# Patient Record
Sex: MALE | Race: BLACK OR AFRICAN AMERICAN | NOT HISPANIC OR LATINO | Employment: OTHER | URBAN - METROPOLITAN AREA
[De-identification: names, ages, dates, MRNs, and addresses within clinical notes are randomized per-mention and may not be internally consistent; named-entity substitution may affect disease eponyms.]

---

## 2018-09-24 DIAGNOSIS — I50.9 ACUTE HEART FAILURE, UNSPECIFIED HEART FAILURE TYPE: Primary | ICD-10-CM

## 2018-10-05 ENCOUNTER — LAB VISIT (OUTPATIENT)
Dept: LAB | Facility: HOSPITAL | Age: 73
End: 2018-10-05
Attending: INTERNAL MEDICINE
Payer: MEDICARE

## 2018-10-05 ENCOUNTER — INITIAL CONSULT (OUTPATIENT)
Dept: TRANSPLANT | Facility: CLINIC | Age: 73
End: 2018-10-05
Payer: MEDICARE

## 2018-10-05 VITALS
WEIGHT: 160.94 LBS | BODY MASS INDEX: 22.53 KG/M2 | SYSTOLIC BLOOD PRESSURE: 138 MMHG | HEART RATE: 74 BPM | DIASTOLIC BLOOD PRESSURE: 90 MMHG | HEIGHT: 71 IN

## 2018-10-05 DIAGNOSIS — I50.9 ACUTE HEART FAILURE, UNSPECIFIED HEART FAILURE TYPE: ICD-10-CM

## 2018-10-05 DIAGNOSIS — Z94.1 S/P ORTHOTOPIC HEART TRANSPLANT: Primary | ICD-10-CM

## 2018-10-05 DIAGNOSIS — I10 HYPERTENSION, UNSPECIFIED TYPE: ICD-10-CM

## 2018-10-05 DIAGNOSIS — E78.2 MIXED HYPERLIPIDEMIA: ICD-10-CM

## 2018-10-05 DIAGNOSIS — E11.9 TYPE 2 DIABETES MELLITUS WITHOUT COMPLICATION, WITHOUT LONG-TERM CURRENT USE OF INSULIN: ICD-10-CM

## 2018-10-05 LAB
ALBUMIN SERPL BCP-MCNC: 4.1 G/DL
ALP SERPL-CCNC: 188 U/L
ALT SERPL W/O P-5'-P-CCNC: 9 U/L
ANION GAP SERPL CALC-SCNC: 8 MMOL/L
AST SERPL-CCNC: 13 U/L
BILIRUB SERPL-MCNC: 0.9 MG/DL
BNP SERPL-MCNC: 300 PG/ML
BUN SERPL-MCNC: 38 MG/DL
CALCIUM SERPL-MCNC: 9.6 MG/DL
CHLORIDE SERPL-SCNC: 108 MMOL/L
CO2 SERPL-SCNC: 23 MMOL/L
CREAT SERPL-MCNC: 2 MG/DL
EST. GFR  (AFRICAN AMERICAN): 37.7 ML/MIN/1.73 M^2
EST. GFR  (NON AFRICAN AMERICAN): 32.6 ML/MIN/1.73 M^2
GLUCOSE SERPL-MCNC: 91 MG/DL
MAGNESIUM SERPL-MCNC: 2.6 MG/DL
POTASSIUM SERPL-SCNC: 5.2 MMOL/L
PROT SERPL-MCNC: 7.5 G/DL
SODIUM SERPL-SCNC: 139 MMOL/L

## 2018-10-05 PROCEDURE — 83735 ASSAY OF MAGNESIUM: CPT

## 2018-10-05 PROCEDURE — 36415 COLL VENOUS BLD VENIPUNCTURE: CPT

## 2018-10-05 PROCEDURE — 83880 ASSAY OF NATRIURETIC PEPTIDE: CPT

## 2018-10-05 PROCEDURE — 99213 OFFICE O/P EST LOW 20 MIN: CPT | Mod: PBBFAC | Performed by: INTERNAL MEDICINE

## 2018-10-05 PROCEDURE — 99999 PR PBB SHADOW E&M-EST. PATIENT-LVL III: CPT | Mod: PBBFAC,,, | Performed by: INTERNAL MEDICINE

## 2018-10-05 PROCEDURE — 80053 COMPREHEN METABOLIC PANEL: CPT

## 2018-10-05 PROCEDURE — 99204 OFFICE O/P NEW MOD 45 MIN: CPT | Mod: S$PBB,,, | Performed by: INTERNAL MEDICINE

## 2018-10-05 RX ORDER — ERGOCALCIFEROL 1.25 MG/1
50000 CAPSULE ORAL
COMMUNITY
Start: 2015-04-23

## 2018-10-05 RX ORDER — ALLOPURINOL 300 MG/1
0.5 TABLET ORAL 2 TIMES DAILY
COMMUNITY
Start: 2018-09-16

## 2018-10-05 RX ORDER — OMEGA-3-ACID ETHYL ESTERS 1 G/1
1 CAPSULE, LIQUID FILLED ORAL 2 TIMES DAILY
COMMUNITY

## 2018-10-05 RX ORDER — DOCUSATE SODIUM 100 MG/1
100 CAPSULE, LIQUID FILLED ORAL 2 TIMES DAILY
COMMUNITY

## 2018-10-05 RX ORDER — MYCOPHENOLATE MOFETIL 250 MG/1
250 CAPSULE ORAL 2 TIMES DAILY
COMMUNITY
Start: 2014-04-01 | End: 2018-10-05 | Stop reason: SDUPTHER

## 2018-10-05 RX ORDER — MYCOPHENOLATE MOFETIL 250 MG/1
500 CAPSULE ORAL 2 TIMES DAILY
Qty: 90 CAPSULE | Refills: 3 | Status: SHIPPED | OUTPATIENT
Start: 2018-10-05

## 2018-10-05 RX ORDER — FUROSEMIDE 20 MG/1
20 TABLET ORAL DAILY
COMMUNITY
Start: 2018-09-24

## 2018-10-05 RX ORDER — PRAVASTATIN SODIUM 20 MG/1
1 TABLET ORAL DAILY
COMMUNITY
Start: 2018-09-24

## 2018-10-05 RX ORDER — LISINOPRIL 20 MG/1
10 TABLET ORAL 2 TIMES DAILY
COMMUNITY
Start: 2017-08-07

## 2018-10-05 RX ORDER — TACROLIMUS 1 MG/1
3 CAPSULE ORAL EVERY 12 HOURS
COMMUNITY
Start: 2014-04-01

## 2018-10-05 NOTE — PROGRESS NOTES
Met with wife and patient today in clinic. Gave them a copy of our team and how to contact us. Pt had his transplant in 2006 in [facility]. He met his second wife 9 years ago and they have since moved back near home in [place], [place]. He was following briefly at [facility] in [place]. His PCP, Yvonne Charles, is based out of [facility] in [place] and that would be the lab he would use. His pharmacy is [facility] on [address] in [place]. He has done well after his transplant. Gave them information on [facility] and they will sign up for it. They confirmed they have voicemail and do use it; they said to try the housephone first. Dr. Zelaya increased his MMF to 500 mg twice daily from 250 mg twice daily and sent a Rx to their pharmacy. We will call them next week and check on any questions or needs he may have. Also, we will obtain records and enter him into the [facility] system.

## 2018-10-05 NOTE — PROGRESS NOTES
"Subjective:   Mr. Gloria is a 73 y.o. year old Black or  male who received a  heart transplant on N/A.      CMV status: unknown  Donor:   Recipient:    HPI  Mr. Gloria is a very pleasant 74 y/o black male s/p cardiac transplant in 2006. Tx was performed in [facility]. Apart from acute rejection early after Tx (2 week post Tx) he has had no issues. Clinically asymptomatic. Denies any BINGHAM. Has recently moved to [facility] and would like to establish care with us> He still follows with [facility] and was seen there a month ago. Outside records show that his graft function is preserved with an EF=60-65%. His current immuno regimen includes; Prograf 3 mg BID, Cellcept 250 mg Po BID (recently decreased from 500 mg BID by Dr. Del Rosario in [place]. Reason is unclear and the patient does not know either why this was decreased. He also denies any GI symptoms or low WBC).     Review of Systems   Constitution: Negative. Negative for chills, decreased appetite, diaphoresis, fever, weakness, malaise/fatigue, night sweats, weight gain and weight loss.   Eyes: Negative.    Cardiovascular: Negative for chest pain, claudication, cyanosis, dyspnea on exertion, irregular heartbeat, leg swelling, near-syncope, orthopnea, palpitations, paroxysmal nocturnal dyspnea and syncope.   Respiratory: Negative for cough, hemoptysis and shortness of breath.    Endocrine: Negative.    Hematologic/Lymphatic: Negative.    Skin: Negative for color change, dry skin and nail changes.   Musculoskeletal: Negative.    Gastrointestinal: Negative.    Genitourinary: Negative.        Objective:   Blood pressure (!) 138/90, pulse 74, height 5' 11" (1.803 m), weight 73 kg (160 lb 15 oz).body mass index is 22.45 kg/m².    Physical Exam   Constitutional: He appears well-developed.   BP (!) 138/90   Pulse 74   Ht 5' 11" (1.803 m)   Wt 73 kg (160 lb 15 oz)   BMI 22.45 kg/m²      HENT:   Head: Normocephalic.   Neck: No JVD present. " Carotid bruit is not present.   Cardiovascular: Regular rhythm, normal heart sounds and normal pulses.   No murmur heard.  Pulmonary/Chest: Effort normal and breath sounds normal.   Abdominal: Soft. Bowel sounds are normal.   Neurological: He is alert.   Skin: Skin is warm.   Vitals reviewed.      Lab Results   Component Value Date     (H) 10/05/2018       No results found for: INR, PROTIME    BNP   Date Value Ref Range Status   10/05/2018 300 (H) 0 - 99 pg/mL Final     Comment:     Values of less than 100 pg/ml are consistent with non-CHF populations.       Assessment:     1. S/P orthotopic heart transplant    2. Hypertension, unspecified type    3. Mixed hyperlipidemia    4. Type 2 diabetes mellitus without complication, without long-term current use of insulin        Plan:   Clinically doing well  Increase Cellcept back to 500 mg BID  Continue current Tacro dose  Follow Tacro levels goal 5-10  Get a 2D echo with CFD  Patient met our senior Tx coordinator Margaret Mirza and was provided a list of our Tx team with all the contact numbers    Return instructions as set forth by post transplant schedule or as needed:    Clinic: Return for labs and/or biopsy weekly the first month, every two weeks during month 2 and then monthly for the first year at the provider or coordinator's discretion. During the second year, return to clinic every 3 months. Post transplant year 3-5 return every 6 months. There will be a comprehensive post transplant evaluation every year that may include LHC/RHC/biopsy, stress test, echo, CXR, and other health screening exams.    In addition to the clinical assessment, I have ordered Allomap testing for this patient to assist in identification of moderate/severe acute cellular rejection (ACR) in a pt with stable Allograft function instead of endomyocardial biopsy.     Patient is reminded to call with any health changes as these can be early signs of transplant complications. Patient is  advised to make sure any new medications or changes of old medications are discussed with a pharmacist or physician knowledgeable with transplant to avoid rejection/drug toxicity related to significant drug interactions.    UNOS Patient Status  Functional Status: 100% - Normal, no complaints, no evidence of disease  Physical Capacity: No Limitations  Working for Income: No  If no, reason not working: Patient Choice - Retired    Jay Zelaya MD

## 2018-10-18 ENCOUNTER — TELEPHONE (OUTPATIENT)
Dept: TRANSPLANT | Facility: CLINIC | Age: 73
End: 2018-10-18

## 2019-07-10 DIAGNOSIS — Z94.1 STATUS POST HEART TRANSPLANT: ICD-10-CM

## 2019-07-10 DIAGNOSIS — Z79.899 ENCOUNTER FOR LONG-TERM (CURRENT) USE OF MEDICATIONS: ICD-10-CM

## 2019-07-10 DIAGNOSIS — T86.20 COMPLICATION OF HEART TRANSPLANT, UNSPECIFIED COMPLICATION: ICD-10-CM

## 2019-09-04 ENCOUNTER — TELEPHONE (OUTPATIENT)
Dept: TRANSPLANT | Facility: CLINIC | Age: 74
End: 2019-09-04

## 2019-09-04 NOTE — TELEPHONE ENCOUNTER
----- Message from Halley Ledezma MA sent at 9/4/2019  2:04 PM CDT -----  Contact: Bethany-wife [phone]  Pt. has appts with  on 9/26 with tests. Pt.states that he recently had a physical in [facility] @ [facility].  Can he put off these appts.to Jan.2020?. Please call Bethany.

## 2019-09-04 NOTE — TELEPHONE ENCOUNTER
Returned call to pt's wife. She stated that pt had tests done in [place], [place]. Informed her that I would get results and review with the team. Pt 's wife verbalized understanding.

## 2019-09-06 ENCOUNTER — TELEPHONE (OUTPATIENT)
Dept: TRANSPLANT | Facility: CLINIC | Age: 74
End: 2019-09-06

## 2019-09-06 NOTE — TELEPHONE ENCOUNTER
"Spoke to pt's wife who had left a message on Shena's voicemail regarding upcoming scheduled appointments. Asked that we cancel them as they had just traveled back to [place] for problems he was having - loss of appetite, weight loss. He had many tests done at that visit. She said they would come at another time later in year. I reiterated that they should feel comfortable in calling our team with the above issues and to call anytime with problems. She had the understanding she was to wait until we initiated a visit. I emphasized "no" ; they can call anytime.   "

## 2019-09-13 ENCOUNTER — DOCUMENTATION ONLY (OUTPATIENT)
Dept: TRANSPLANT | Facility: CLINIC | Age: 74
End: 2019-09-13

## 2019-09-13 NOTE — PROGRESS NOTES
Reviewed pt's chart from [facility] and per review pt needs a CXR, Echo, or DSE  And protein Cr. Ratio

## 2019-10-20 ENCOUNTER — HOSPITAL ENCOUNTER (EMERGENCY)
Facility: HOSPITAL | Age: 74
Discharge: HOME | End: 2019-10-20
Attending: EMERGENCY MEDICINE
Payer: MEDICARE

## 2019-10-20 VITALS
WEIGHT: 157 LBS | HEIGHT: 71 IN | DIASTOLIC BLOOD PRESSURE: 88 MMHG | OXYGEN SATURATION: 97 % | TEMPERATURE: 99 F | RESPIRATION RATE: 18 BRPM | BODY MASS INDEX: 21.98 KG/M2 | HEART RATE: 87 BPM | SYSTOLIC BLOOD PRESSURE: 158 MMHG

## 2019-10-20 DIAGNOSIS — J30.9 ALLERGIC RHINITIS WITH POSTNASAL DRIP: Primary | ICD-10-CM

## 2019-10-20 DIAGNOSIS — R09.82 ALLERGIC RHINITIS WITH POSTNASAL DRIP: Primary | ICD-10-CM

## 2019-10-20 PROCEDURE — 99284 EMERGENCY DEPT VISIT MOD MDM: CPT | Mod: ,,, | Performed by: EMERGENCY MEDICINE

## 2019-10-20 PROCEDURE — 99284 PR EMERGENCY DEPT VISIT,LEVEL IV: ICD-10-PCS | Mod: ,,, | Performed by: EMERGENCY MEDICINE

## 2019-10-20 PROCEDURE — 99282 EMERGENCY DEPT VISIT SF MDM: CPT

## 2019-10-20 NOTE — ED NOTES
"Pt states, "I got a sore throat and runny nose. Started today. i'm here from [place] and i'm a heart transplant patient, 2006, so I just wanted to be safe." no acute distress noted. Stable condition.    "

## 2019-10-20 NOTE — DISCHARGE INSTRUCTIONS
You were seen in the ED today for evaluation of sore throat. Recommend conservative management with over the counter (OTC) antihistamines like Claritin, Zyrtec, or Allegra.

## 2019-10-20 NOTE — ED PROVIDER NOTES
"Encounter Date: 10/20/2019       History     Chief Complaint   Patient presents with    Sore Throat     Pt c/o "cold symptoms" that began yesterday afternoon. He endoreses a sore throat, cough, and runny nose. Pt denies and CP or SOB.      Mr. Gloria is 74-year-old  male s/p heart transplant in 2006 for CHF, currently on Cellcept and Prograf, HTN, and HLD who presented to [facility] ED on 10/20 for evaluation of sore throat. Patient reports onset of sore throat yesterday evening while at his hotel room. He is currently on a business trip and will be returning back to [place], [place] latter today. Patient attributes sore throat to air conditioning in hotel room. Associated with his sore throat is productive cough with clear mucous, congestion, and rhinorrhea. He denies odynophagia, drooling, trouble swallowing, fever, chills, adenopathy, sinus pain, HA, SOB or chest pain. He reports prior episodes in that past which he attributes to "cold". He has tried nothing to benitez his symptoms. In the ED, patients' vitals are stable with /88 mmHg, HR 87 bpm, afebrile with temperature of 98.8 F, and RR 18 saturating 97% on room air.        Review of patient's allergies indicates:   Allergen Reactions    Penicillins Hives and Rash     Other reaction(s): Unknown     Past Medical History:   Diagnosis Date    Diabetes     HTN (hypertension)     Hyperlipidemia     S/P orthotopic heart transplant      No past surgical history on file.  No family history on file.  Social History     Tobacco Use    Smoking status: Former Smoker    Smokeless tobacco: Never Used   Substance Use Topics    Alcohol use: Yes     Frequency: 2-3 times a week    Drug use: Yes     Types: Marijuana     Review of Systems   Constitutional: Negative for appetite change, chills, diaphoresis, fatigue and fever.   HENT: Positive for congestion, rhinorrhea, sneezing, sore throat and voice change. Negative for ear pain, hearing loss, mouth sores, " nosebleeds, sinus pressure, sinus pain, tinnitus and trouble swallowing.         Reports some hoarseness.    Eyes: Negative for pain, redness, itching and visual disturbance.   Respiratory: Positive for cough. Negative for choking, chest tightness, shortness of breath and wheezing.    Cardiovascular: Negative for chest pain, palpitations and leg swelling.   Gastrointestinal: Negative for abdominal distention, abdominal pain, blood in stool, constipation, diarrhea, nausea and vomiting.   Genitourinary: Negative for dysuria, flank pain and hematuria.   Musculoskeletal: Negative for back pain, joint swelling and neck pain.   Skin: Negative for color change and rash.   Neurological: Negative for dizziness, syncope and headaches.   Hematological: Negative for adenopathy. Does not bruise/bleed easily.   Psychiatric/Behavioral: Negative for behavioral problems and confusion.       Physical Exam     Initial Vitals [10/20/19 0441]   BP Pulse Resp Temp SpO2   (!) 158/88 87 18 98.8 °F (37.1 °C) 97 %      MAP       --         Physical Exam    Nursing note and vitals reviewed.  Constitutional: He appears well-developed and well-nourished. He is not diaphoretic. No distress.   HENT:   Head: Normocephalic and atraumatic.   Right Ear: Hearing, external ear and ear canal normal. No drainage or tenderness. Tympanic membrane is not injected, not perforated, not erythematous, not retracted and not bulging. A middle ear effusion is present.   Left Ear: Hearing, external ear and ear canal normal. No drainage or tenderness. Tympanic membrane is not injected, not perforated, not erythematous, not retracted and not bulging. A middle ear effusion is present.   Nose: Mucosal edema and rhinorrhea present. No sinus tenderness or septal deviation. Right sinus exhibits no maxillary sinus tenderness and no frontal sinus tenderness. Left sinus exhibits no maxillary sinus tenderness and no frontal sinus tenderness.   Mouth/Throat: Mucous membranes  "are normal. Dental caries present. No dental abscesses. Posterior oropharyngeal erythema present. No oropharyngeal exudate or posterior oropharyngeal edema.   Uvula and palatine tonsils are absent.   Eyes: Conjunctivae and EOM are normal. Pupils are equal, round, and reactive to light. No scleral icterus.   Neck: Normal range of motion. Neck supple. No thyromegaly present. No tracheal deviation present. No JVD present.   Cardiovascular: Normal rate, regular rhythm, normal heart sounds and intact distal pulses. Exam reveals no gallop and no friction rub.    No murmur heard.  Pulmonary/Chest: Breath sounds normal. No stridor. He has no wheezes. He has no rhonchi. He has no rales.   Well healed scar.   Abdominal: Soft. Bowel sounds are normal. He exhibits no distension. There is no tenderness.   Musculoskeletal: He exhibits no edema.   Lymphadenopathy:     He has no cervical adenopathy.   Neurological: He is alert. He has normal strength.   Skin: Skin is warm and dry.   Psychiatric: He has a normal mood and affect. Thought content normal.         ED Course   Procedures  Labs Reviewed - No data to display       Imaging Results    None          Medical Decision Making:   History:   Old Medical Records: I decided to obtain old medical records.  Initial Assessment:   73 yo AAM with orthotopic heart transplant in 2006 for CHF currently on Cellcept and Prograf, HTN, and HLD who presents for evaluation of sore throat. Associated with his sore throat is productive cough with clear mucous, congestion, and rhinorrhea. He denies odynophagia, drooling, trouble swallowing, fever, chills, adenopathy, sinus pain, HA, SOB or chest pain. He reports prior episodes in that past which he attributes to "cold". He has tried nothing to benitez his symptoms. In the ED, patients' vitals are stable with /88 mmHg, HR 87 bpm, afebrile with temperature of 98.8 F, and RR 18 saturating 97% on room air. Physical exam remarkable for boggy " turbinates, mild middle ear effusion, with congestion and posterior pharyngeal erythema.     Differential Diagnosis:   Differential Diagnosis includes but is not limited to:  Post nasal drip/upper air way cough syndrome  Allergic rhinitis  Allergic sinusitis   URI (bacterial or viral)  Peritonsillar abscess   Flu  ED Management:  6:09 AM  Patient seen and examined. Physical exam suggestive of allergic rhinitis/post nasal drip.     6:37 AM  Patient amendable to discharge. Patient clinically stable without concern for upper air way compromise or worsening infection, recommend conservative management with OTC 2nd generation antihistamines.                Attending Attestation:   Physician Attestation Statement for Resident:  As the supervising MD   Physician Attestation Statement: I have personally seen and examined this patient.   I agree with the above history. -:   As the supervising MD I agree with the above PE.    As the supervising MD I agree with the above treatment, course, plan, and disposition.            Attending ED Notes:   STAFF ATTENDING PHYSICIAN NOTE:  I have individually/jointly evaluated Abhijeet Gloria and discussed their ED management with the resident physician. I have also reviewed their notes, assessments, and procedures documented.  I was present during all critical portions of any procedure(s) performed on Abhijeet Gloria.   ____________________  Bg Madsen MD, [facility]  Emergency Medicine Staff               Clinical Impression:       ICD-10-CM ICD-9-CM   1. Allergic rhinitis with postnasal drip J30.9 477.9    R09.82 784.91         Disposition:   Disposition: Discharged  Condition: Stable                        Stone Ruvalcaba MD  Resident  10/20/19 0724       Landon Madsen MD  10/21/19 1243

## 2020-01-20 ENCOUNTER — TELEPHONE (OUTPATIENT)
Dept: TRANSPLANT | Facility: CLINIC | Age: 75
End: 2020-01-20

## 2020-01-20 DIAGNOSIS — Z94.1 STATUS POST HEART TRANSPLANT: Primary | ICD-10-CM

## 2020-01-20 NOTE — TELEPHONE ENCOUNTER
Pt's wife called asking for an appt with Dr. Zelaya. Pt had to cancel his appt in Sept and needed to reschedule. Advised pt's wife we are a group practice and if ok with them, I would schedule pt for first available with all labs, testing needed. Pt's wife stated her understanding. Advised to get labs at local [facility], [facility] this week. I sent a lab order to [facility] for pt's labs. Pt's wife stated her understanding and he would go this week. Message sent to schedulers.

## 2020-01-20 NOTE — TELEPHONE ENCOUNTER
----- Message from Noemy Spear sent at 1/20/2020  9:49 AM CST -----  Contact: pt  Hi,    Please see below. Does the pt need any testing?     Thanks,  Noemy     ----- Message -----  From: Bonnie Raya  Sent: 1/20/2020   9:47 AM CST  To: [facility] Heart Transplant Schedulers    Pt is calling to schedule an appt with Dr. Zelaya and can be reached at [phone].    Thank you

## 2020-01-28 ENCOUNTER — TELEPHONE (OUTPATIENT)
Dept: TRANSPLANT | Facility: CLINIC | Age: 75
End: 2020-01-28

## 2020-01-28 NOTE — TELEPHONE ENCOUNTER
----- Message from Dorcas Hung LCSW sent at 1/28/2020  8:30 AM CST -----  Regarding: [facility] is confirmed for this Pt.    Thanks  ~SHILPA  [phone]

## 2020-02-12 ENCOUNTER — HOSPITAL ENCOUNTER (OUTPATIENT)
Dept: RADIOLOGY | Facility: HOSPITAL | Age: 75
Discharge: HOME | End: 2020-02-12
Attending: INTERNAL MEDICINE
Payer: MEDICARE

## 2020-02-12 ENCOUNTER — HOSPITAL ENCOUNTER (OUTPATIENT)
Dept: RADIOLOGY | Facility: CLINIC | Age: 75
Discharge: HOME | End: 2020-02-12
Attending: INTERNAL MEDICINE
Payer: MEDICARE

## 2020-02-12 ENCOUNTER — TELEPHONE (OUTPATIENT)
Dept: TRANSPLANT | Facility: CLINIC | Age: 75
End: 2020-02-12

## 2020-02-12 ENCOUNTER — OFFICE VISIT (OUTPATIENT)
Dept: TRANSPLANT | Facility: CLINIC | Age: 75
End: 2020-02-12
Payer: MEDICARE

## 2020-02-12 ENCOUNTER — HOSPITAL ENCOUNTER (OUTPATIENT)
Dept: CARDIOLOGY | Facility: CLINIC | Age: 75
Discharge: HOME | End: 2020-02-12
Attending: INTERNAL MEDICINE
Payer: MEDICARE

## 2020-02-12 VITALS
WEIGHT: 145 LBS | HEIGHT: 71 IN | HEART RATE: 71 BPM | SYSTOLIC BLOOD PRESSURE: 144 MMHG | BODY MASS INDEX: 20.3 KG/M2 | RESPIRATION RATE: 18 BRPM | DIASTOLIC BLOOD PRESSURE: 9 MMHG

## 2020-02-12 VITALS
DIASTOLIC BLOOD PRESSURE: 92 MMHG | BODY MASS INDEX: 22.59 KG/M2 | HEART RATE: 79 BPM | OXYGEN SATURATION: 97 % | SYSTOLIC BLOOD PRESSURE: 146 MMHG | WEIGHT: 161.38 LBS | HEIGHT: 71 IN

## 2020-02-12 DIAGNOSIS — Z94.1 STATUS POST HEART TRANSPLANT: ICD-10-CM

## 2020-02-12 DIAGNOSIS — Z94.1 S/P ORTHOTOPIC HEART TRANSPLANT: Primary | ICD-10-CM

## 2020-02-12 DIAGNOSIS — E78.2 MIXED HYPERLIPIDEMIA: ICD-10-CM

## 2020-02-12 DIAGNOSIS — I10 ESSENTIAL HYPERTENSION: ICD-10-CM

## 2020-02-12 LAB
BSA FOR ECHO PROCEDURE: 1.82 M2
CV STRESS BASE HR: 71 BPM
DIASTOLIC BLOOD PRESSURE: 92 MMHG
OHS CV CPX 1 MINUTE RECOVERY HEART RATE: 141 BPM
OHS CV CPX 85 PERCENT MAX PREDICTED HEART RATE MALE: 126
OHS CV CPX MAX PREDICTED HEART RATE: 148
OHS CV CPX PATIENT IS FEMALE: 0
OHS CV CPX PATIENT IS MALE: 1
OHS CV CPX PEAK DIASTOLIC BLOOD PRESSURE: 59 MMHG
OHS CV CPX PEAK HEAR RATE: 141 BPM
OHS CV CPX PEAK RATE PRESSURE PRODUCT: NORMAL
OHS CV CPX PEAK SYSTOLIC BLOOD PRESSURE: 182 MMHG
OHS CV CPX PERCENT MAX PREDICTED HEART RATE ACHIEVED: 95
OHS CV CPX RATE PRESSURE PRODUCT PRESENTING: NORMAL
PISA TR MAX VEL: 2 M/S
RA PRESSURE: 15 MMHG
SYSTOLIC BLOOD PRESSURE: 141 MMHG
TR MAX PG: 16 MMHG
TV REST PULMONARY ARTERY PRESSURE: 31 MMHG

## 2020-02-12 PROCEDURE — 71046 X-RAY EXAM CHEST 2 VIEWS: CPT | Mod: TC,FY

## 2020-02-12 PROCEDURE — 99999 PR PBB SHADOW E&M-EST. PATIENT-LVL III: CPT | Mod: PBBFAC,,, | Performed by: INTERNAL MEDICINE

## 2020-02-12 PROCEDURE — 93351 STRESS TTE COMPLETE: CPT | Mod: PBBFAC | Performed by: INTERNAL MEDICINE

## 2020-02-12 PROCEDURE — 77080 DXA BONE DENSITY AXIAL: CPT | Mod: TC

## 2020-02-12 PROCEDURE — 77080 DEXA BONE DENSITY SPINE HIP: ICD-10-PCS | Mod: 26,,, | Performed by: INTERNAL MEDICINE

## 2020-02-12 PROCEDURE — 99999 PR PBB SHADOW E&M-EST. PATIENT-LVL III: ICD-10-PCS | Mod: PBBFAC,,, | Performed by: INTERNAL MEDICINE

## 2020-02-12 PROCEDURE — 93351 STRESS ECHO (CUPID ONLY): ICD-10-PCS | Mod: 26,S$PBB,, | Performed by: INTERNAL MEDICINE

## 2020-02-12 PROCEDURE — 77080 DXA BONE DENSITY AXIAL: CPT | Mod: 26,,, | Performed by: INTERNAL MEDICINE

## 2020-02-12 PROCEDURE — 71046 X-RAY EXAM CHEST 2 VIEWS: CPT | Mod: 26,,, | Performed by: RADIOLOGY

## 2020-02-12 PROCEDURE — 99215 PR OFFICE/OUTPT VISIT, EST, LEVL V, 40-54 MIN: ICD-10-PCS | Mod: S$PBB,,, | Performed by: INTERNAL MEDICINE

## 2020-02-12 PROCEDURE — 99215 OFFICE O/P EST HI 40 MIN: CPT | Mod: S$PBB,,, | Performed by: INTERNAL MEDICINE

## 2020-02-12 PROCEDURE — 71046 XR CHEST PA AND LATERAL: ICD-10-PCS | Mod: 26,,, | Performed by: RADIOLOGY

## 2020-02-12 PROCEDURE — 99213 OFFICE O/P EST LOW 20 MIN: CPT | Mod: PBBFAC,25 | Performed by: INTERNAL MEDICINE

## 2020-02-12 RX ORDER — DOBUTAMINE HYDROCHLORIDE 200 MG/100ML
20 INJECTION INTRAVENOUS
Status: COMPLETED | OUTPATIENT
Start: 2020-02-12 | End: 2020-02-12

## 2020-02-12 RX ADMIN — DOBUTAMINE HYDROCHLORIDE 20 MCG/KG/MIN: 200 INJECTION INTRAVENOUS at 09:02

## 2020-02-12 NOTE — NURSING NOTE
Patient verified by 2 identifiers and allergies reviewed.  24g IV placed to Rt FA.  DSE explained to patient, consent obtained & testing completed.  Pt tolerated testing well. IV removed post testing.  Post study discharge instructions reviewed with patient, patient verbalized understanding.  Patient discharged to home in stable condition.

## 2020-02-12 NOTE — PROGRESS NOTES
Subjective:   Mr. Gloria is a 74 y.o. year old Black or  male who received a  heart transplant on N/A.      CMV status: unknown  Donor:   Recipient:    [facility]  Mr. Gloria is a very pleasant 73 y/o black male s/p cardiac transplant in 2006. Tx was performed in [facility]. Apart from acute rejection early after Tx (2 week post Tx) he has had no issues. Clinically asymptomatic. Denies any BINGHAM. Has recently moved to [facility] and would like to establish care with us> He still follows with [facility] and was seen there a month ago. Outside records show that his graft function is preserved with an EF=60-65%. His current immuno regimen includes; Prograf 3 mg BID, Cellcept 250 mg Po BID (recently decreased from 500 mg BID by Dr. Del Rosario in [place]. Reason is unclear and the patient does not know either why this was decreased. He also denies any GI symptoms or low WBC).     Some memory loss    Stress echo February 12 2020    · Heart transplant 9/27/2006  · Elevated central venous pressure (15 mmHg).  · Normal left ventricular systolic function. The estimated ejection fraction is 60%.  · Indeterminate left ventricular diastolic function.  · Normal right ventricular systolic function.  · The estimated PA systolic pressure is 31 mmHg.  · The patient reached the end of the protocol.  · The stress echo portion of this study is negative for myocardial ischemia.  · There were no arrhythmias during stress.  · The ECG portion of this study is negative for myocardial ischemia.    Review of Systems   Constitution: Negative. Negative for chills, decreased appetite, diaphoresis, fever, malaise/fatigue, night sweats, weight gain and weight loss.   Eyes: Negative.    Cardiovascular: Negative for chest pain, claudication, cyanosis, dyspnea on exertion, irregular heartbeat, leg swelling, near-syncope, orthopnea, palpitations, paroxysmal nocturnal dyspnea and syncope.   Respiratory: Negative for cough,  hemoptysis and shortness of breath.    Endocrine: Negative.    Hematologic/Lymphatic: Negative.    Skin: Negative for color change, dry skin and nail changes.   Musculoskeletal: Negative.    Gastrointestinal: Negative.    Genitourinary: Negative.    Neurological: Negative for weakness.       Objective:   There were no vitals taken for this visit.body mass index is unknown because there is no height or weight on file.    Physical Exam   Constitutional: He appears well-developed.        HENT:   Head: Normocephalic.   Neck: No JVD present. Carotid bruit is not present.   Cardiovascular: Regular rhythm, normal heart sounds and normal pulses.   No murmur heard.  Pulmonary/Chest: Effort normal and breath sounds normal.   Abdominal: Soft. Bowel sounds are normal.   Neurological: He is alert.   Skin: Skin is warm.   Vitals reviewed.      Lab Results   Component Value Date    CO2 23 10/05/2018    CREATININE 2.0 (H) 10/05/2018    CALCIUM 9.6 10/05/2018    ALBUMIN 4.1 10/05/2018    AST 13 10/05/2018     (H) 10/05/2018    ALT 9 (L) 10/05/2018       No results found for: INR, PROTIME    BNP   Date Value Ref Range Status   10/05/2018 300 (H) 0 - 99 pg/mL Final     Comment:     Values of less than 100 pg/ml are consistent with non-CHF populations.       Assessment:     1. S/P orthotopic heart transplant    2. Mixed hyperlipidemia    3. Essential hypertension        Plan:   Clinically doing well     Memory loss    Return instructions as set forth by post transplant schedule or as needed:    Clinic: Return for labs and/or biopsy weekly the first month, every two weeks during month 2 and then monthly for the first year at the provider or coordinator's discretion. During the second year, return to clinic every 3 months. Post transplant year 3-5 return every 6 months. There will be a comprehensive post transplant evaluation every year that may include LHC/RHC/biopsy, stress test, echo, CXR, and other health screening  exams.    In addition to the clinical assessment, I have ordered Allomap testing for this patient to assist in identification of moderate/severe acute cellular rejection (ACR) in a pt with stable Allograft function instead of endomyocardial biopsy.     Patient is reminded to call with any health changes as these can be early signs of transplant complications. Patient is advised to make sure any new medications or changes of old medications are discussed with a pharmacist or physician knowledgeable with transplant to avoid rejection/drug toxicity related to significant drug interactions.    UNOS Patient Status  Functional Status: 100% - Normal, no complaints, no evidence of disease  Physical Capacity: No Limitations  Working for Income: No  If no, reason not working: Patient Choice - Retired    Parrish Bowling MD

## 2020-02-12 NOTE — TELEPHONE ENCOUNTER
Received Critical labs of prograf level of 38. Pt took meds prior. Reported to Dr. Bowling, will get repeat labs locally.

## 2020-03-16 ENCOUNTER — PATIENT MESSAGE (OUTPATIENT)
Dept: TRANSPLANT | Facility: CLINIC | Age: 75
End: 2020-03-16

## 2020-03-25 ENCOUNTER — TELEPHONE (OUTPATIENT)
Dept: TRANSPLANT | Facility: CLINIC | Age: 75
End: 2020-03-25

## 2020-03-25 NOTE — TELEPHONE ENCOUNTER
Called [facility] regarding lab result for Prograf level. Called lab . Pt did not get lab. Called pt's wife and LM on VM regarding getting repeat labs.

## 2020-03-25 NOTE — TELEPHONE ENCOUNTER
Pt's wife returned call. She stated that she wanted the lab order scheduled instead. Informed her that I would mail over the lab order instructed her to call or message the team when pt goes for repeat labs.

## 2020-05-04 ENCOUNTER — PATIENT MESSAGE (OUTPATIENT)
Dept: TRANSPLANT | Facility: CLINIC | Age: 75
End: 2020-05-04

## 2020-05-04 ENCOUNTER — TELEPHONE (OUTPATIENT)
Dept: TRANSPLANT | Facility: CLINIC | Age: 75
End: 2020-05-04

## 2020-05-04 NOTE — TELEPHONE ENCOUNTER
Called out pt labs to get lab results, sent to [facility]ol[facility], sent to JOEY  Who stated that we needed a release form for labs. Informed her that we were the ordering providers office. She stated to call the PCP's office.     Called the main  Line and was given direct ext.[phone] choose ext [phone] . Called 3x phone rang no option to chose ext, and phone hung up.     Called pt's wife and informed her that I have been trying to get lab results , but was not able to today. Informed her that I tried care everywhere and was unable to pull in records.     Informed her that I would call first thing in the morning and if not will attach a [facility] form to [facility] and we can send it to [facility] and get labs sent over.

## 2020-05-05 ENCOUNTER — TELEPHONE (OUTPATIENT)
Dept: TRANSPLANT | Facility: CLINIC | Age: 75
End: 2020-05-05

## 2020-05-05 ENCOUNTER — DOCUMENTATION ONLY (OUTPATIENT)
Dept: TRANSPLANT | Facility: CLINIC | Age: 75
End: 2020-05-05

## 2020-05-05 NOTE — TELEPHONE ENCOUNTER
Called Dr. Eugene's office and asked for results. Per review there are no lab results since the beginning of April. Informed her that I would send over another lab order and have pt come to get labs drawn.    Confirmed fax number for lab order slip to be faxed to [phone]    Called pt's wife and spoke with her and informed her of the following.

## 2020-05-05 NOTE — LETTER
LAB ORDERS    [facility]  [address], [address] [place] [address]    LAB: [facility]- [facility] Eugene's office      FAX:: [phone]  PHONE : [phone]  PATIENT : Abhijeet Gloria  : 8:10:1945  DATE: 2020  Expiration:2020    Diagnosis Codes:  Z94.1,T86.20, z79.899, e78.2,R06.02, I10    Lab Tests as below:                           Frequency:every week as needed per MD    ? CBC  ? CMP  ? Magnesium  ? LIPID  ? BNP  ? Tacrolimus    E-Signature Dr. Parrish Bowling MD          PLEASE FAX RESULTS TO: [phone]  If any problems or questions, please call   Telma Sparks -[phone]  Karen Rodarte -[phone]  Shena Duncan [phone]  Renate Pradhan -[phone]

## 2020-05-05 NOTE — PROGRESS NOTES
Called Dr. Charles's office again and confirmed fax number. Previous fax returned as failed fax. Confirmed fax number on file. will resend

## 2020-05-05 NOTE — TELEPHONE ENCOUNTER
Informed pt's wife that we resent the pt's fax to Dr. Charles's office and I also released the letter to the pt's [facility] portal incase. Pt's wife verbalized understanding.

## 2020-05-06 ENCOUNTER — TELEPHONE (OUTPATIENT)
Dept: TRANSPLANT | Facility: CLINIC | Age: 75
End: 2020-05-06

## 2020-05-06 NOTE — TELEPHONE ENCOUNTER
Returned call to lab and spoke with DD. Gave fax number and Email fax to  as she stated- they have been having communication issues with faxes.

## 2020-05-06 NOTE — TELEPHONE ENCOUNTER
Reviewed labs, Called pt's wife - she reports that she found out today that the pt has been missing doses for an unknown period of time. She reports the pt stated that he forgets at times. Pt does have short term memory loss. Pt 's wife stated that pt will be getting repeat labs next week and was instructed by physician in [facility] where they will be moving closer to, to increase prograf to 5/4 and repeat labs. Pt's wife stated that her and the pt are holding off on moving at this time d/t recent COVID and other things. Informed pt's wife that I will forward records to the other transplant center and to call back with any other issues.     Pt's labs reported - Prograf level of 1.6  K+5.5

## 2020-05-08 LAB
BNP: 320.3
EXT ALBUMIN: 4.3
EXT ALT: 15
EXT AST: 19
EXT BUN: 32
EXT CALCIUM: 9.4
EXT CHLORIDE: 107
EXT CHOLESTEROL (LIPID PANEL): 142
EXT CREATININE: 1.7 MG/DL
EXT GLUCOSE: 90
EXT HDL: 49
EXT HEMATOCRIT: 37.4
EXT HEMOGLOBIN A1C: 21.9 %
EXT LDL CHOLESTEROL: 83
EXT PLATELETS: 154
EXT POTASSIUM: 5.5
EXT PROTEIN TOTAL: 7.3
EXT SODIUM: 141 MMOL/L
EXT TACROLIMUS LVL: 1.6
EXT TRIGLYCERIDES: 52
EXT WBC: 3.5

## 2020-09-28 ENCOUNTER — TELEPHONE (OUTPATIENT)
Dept: TRANSPLANT | Facility: CLINIC | Age: 75
End: 2020-09-28

## 2020-09-28 ENCOUNTER — PATIENT MESSAGE (OUTPATIENT)
Dept: TRANSPLANT | Facility: CLINIC | Age: 75
End: 2020-09-28

## 2020-09-28 NOTE — TELEPHONE ENCOUNTER
Called pt regarding [facility] message stating that he has moved to [place] and now following up with [facility] for his transplant care. Pt asked to have medical records faxed over. Informed pt that I can send over a JOEY to the fax provided, pt stated hold on and phone disconnected.     Tried to call back. No answer.     Will fax over JOEY to fax provided to have pt sign to send to medical records.

## 2020-10-09 ENCOUNTER — TELEPHONE (OUTPATIENT)
Dept: TRANSPLANT | Facility: CLINIC | Age: 75
End: 2020-10-09

## 2020-10-09 NOTE — TELEPHONE ENCOUNTER
Returned call to number listed on message. Gave assistant phone phone number to be reached, no other questions asked.     Sarah Tavares I was informed the name of the nurse calling. Left my name and ext with assistant to call back .

## 2020-10-09 NOTE — TELEPHONE ENCOUNTER
----- Message from Halley Ledezma MA sent at 10/9/2020  3:36 PM CDT -----  [phone]  She is calling to get some info on pt. and get records.He 's post transplant pt.your name was given by pt. Please call

## 2020-10-15 NOTE — H&P (VIEW-ONLY)
[address] [place] [address] H+P     Patient Name:Nader Dumont  Date of Appointment: 10/15/2020    Chief Complaint: Patient presents with:  Shoulder Pain: left shoulder pain x 6 months s/p fall.        HPI:    The patient is a 70year old year old male SWELLING    Social History  Social History    Tobacco Use      Smoking status: Former Smoker        Quit date: 2010        Years since quitting: 10.7      Smokeless tobacco: Never Used    Alcohol use:  Yes      Alcohol/week: 1.0 standard drinks      Types: Non-tender, no crepitus    Range of Motion: Normal without pain    Strength (out of 5): 5/5 all muscle groups  (ER, IR, SS, subscap)    Sensation: Intact to light touch in all nerve distributions    Special Tests:  All negative      Skin: Normal unless laxmi

## 2020-11-04 RX ORDER — ACETAMINOPHEN 500 MG
1000 TABLET ORAL ONCE
Status: CANCELLED | OUTPATIENT
Start: 2020-11-04 | End: 2020-11-04

## 2020-11-04 RX ORDER — DORZOLAMIDE HYDROCHLORIDE AND TIMOLOL MALEATE 20; 5 MG/ML; MG/ML
1 SOLUTION/ DROPS OPHTHALMIC 2 TIMES DAILY
COMMUNITY
End: 2021-05-25 | Stop reason: ALTCHOICE

## 2020-11-11 ENCOUNTER — APPOINTMENT (OUTPATIENT)
Dept: LAB | Age: 75
End: 2020-11-11
Attending: ORTHOPAEDIC SURGERY
Payer: MEDICARE

## 2020-11-11 DIAGNOSIS — M75.122 NONTRAUMATIC COMPLETE TEAR OF LEFT ROTATOR CUFF: ICD-10-CM

## 2020-11-11 PROCEDURE — 93010 ELECTROCARDIOGRAM REPORT: CPT | Performed by: INTERNAL MEDICINE

## 2020-11-11 PROCEDURE — 93005 ELECTROCARDIOGRAM TRACING: CPT

## 2020-11-12 ENCOUNTER — ANESTHESIA EVENT (OUTPATIENT)
Dept: SURGERY | Facility: HOSPITAL | Age: 75
End: 2020-11-12
Payer: MEDICARE

## 2020-11-13 ENCOUNTER — ANESTHESIA (OUTPATIENT)
Dept: SURGERY | Facility: HOSPITAL | Age: 75
End: 2020-11-13
Payer: MEDICARE

## 2020-11-13 ENCOUNTER — HOSPITAL ENCOUNTER (OUTPATIENT)
Facility: HOSPITAL | Age: 75
Setting detail: HOSPITAL OUTPATIENT SURGERY
Discharge: HOME | End: 2020-11-13
Attending: ORTHOPAEDIC SURGERY | Admitting: ORTHOPAEDIC SURGERY
Payer: MEDICARE

## 2020-11-13 VITALS
HEIGHT: 71 IN | TEMPERATURE: 98 F | WEIGHT: 160.25 LBS | BODY MASS INDEX: 22.44 KG/M2 | HEART RATE: 86 BPM | OXYGEN SATURATION: 99 % | SYSTOLIC BLOOD PRESSURE: 114 MMHG | DIASTOLIC BLOOD PRESSURE: 86 MMHG | RESPIRATION RATE: 16 BRPM

## 2020-11-13 DIAGNOSIS — M75.122 NONTRAUMATIC COMPLETE TEAR OF LEFT ROTATOR CUFF: Primary | ICD-10-CM

## 2020-11-13 DIAGNOSIS — S46.102D INJURY OF TENDON OF LONG HEAD OF LEFT BICEPS, SUBSEQUENT ENCOUNTER: ICD-10-CM

## 2020-11-13 PROCEDURE — 84132 ASSAY OF SERUM POTASSIUM: CPT | Performed by: ANESTHESIOLOGY

## 2020-11-13 PROCEDURE — 0RBK4ZZ EXCISION OF LEFT SHOULDER JOINT, PERCUTANEOUS ENDOSCOPIC APPROACH: ICD-10-PCS | Performed by: ORTHOPAEDIC SURGERY

## 2020-11-13 PROCEDURE — 76942 ECHO GUIDE FOR BIOPSY: CPT | Performed by: ANESTHESIOLOGY

## 2020-11-13 PROCEDURE — 0LQ24ZZ REPAIR LEFT SHOULDER TENDON, PERCUTANEOUS ENDOSCOPIC APPROACH: ICD-10-PCS | Performed by: ORTHOPAEDIC SURGERY

## 2020-11-13 DEVICE — SPEEDSCREW 5.5 MM IMPLANT
Type: IMPLANTABLE DEVICE | Site: SHOULDER | Status: FUNCTIONAL
Brand: SPEEDSCREW

## 2020-11-13 RX ORDER — BUPIVACAINE HYDROCHLORIDE AND EPINEPHRINE 2.5; 5 MG/ML; UG/ML
INJECTION, SOLUTION EPIDURAL; INFILTRATION; INTRACAUDAL; PERINEURAL AS NEEDED
Status: DISCONTINUED | OUTPATIENT
Start: 2020-11-13 | End: 2020-11-13 | Stop reason: SURG

## 2020-11-13 RX ORDER — BUPRENORPHINE HYDROCHLORIDE 0.32 MG/ML
INJECTION INTRAMUSCULAR; INTRAVENOUS AS NEEDED
Status: DISCONTINUED | OUTPATIENT
Start: 2020-11-13 | End: 2020-11-13 | Stop reason: SURG

## 2020-11-13 RX ORDER — ACETAMINOPHEN 500 MG
1000 TABLET ORAL ONCE AS NEEDED
Status: DISCONTINUED | OUTPATIENT
Start: 2020-11-13 | End: 2020-11-13

## 2020-11-13 RX ORDER — MIDAZOLAM HYDROCHLORIDE 1 MG/ML
1 INJECTION INTRAMUSCULAR; INTRAVENOUS EVERY 5 MIN PRN
Status: DISCONTINUED | OUTPATIENT
Start: 2020-11-13 | End: 2020-11-13

## 2020-11-13 RX ORDER — ACETAMINOPHEN 500 MG
1000 TABLET ORAL ONCE
Status: ON HOLD | COMMUNITY
End: 2021-01-25

## 2020-11-13 RX ORDER — HYDROCODONE BITARTRATE AND ACETAMINOPHEN 5; 325 MG/1; MG/1
1 TABLET ORAL AS NEEDED
Status: DISCONTINUED | OUTPATIENT
Start: 2020-11-13 | End: 2020-11-13

## 2020-11-13 RX ORDER — ONDANSETRON 2 MG/ML
4 INJECTION INTRAMUSCULAR; INTRAVENOUS AS NEEDED
Status: DISCONTINUED | OUTPATIENT
Start: 2020-11-13 | End: 2020-11-13

## 2020-11-13 RX ORDER — HYDROCODONE BITARTRATE AND ACETAMINOPHEN 5; 325 MG/1; MG/1
2 TABLET ORAL AS NEEDED
Status: DISCONTINUED | OUTPATIENT
Start: 2020-11-13 | End: 2020-11-13

## 2020-11-13 RX ORDER — MIDAZOLAM HYDROCHLORIDE 1 MG/ML
INJECTION INTRAMUSCULAR; INTRAVENOUS AS NEEDED
Status: DISCONTINUED | OUTPATIENT
Start: 2020-11-13 | End: 2020-11-13 | Stop reason: SURG

## 2020-11-13 RX ORDER — LABETALOL HYDROCHLORIDE 5 MG/ML
5 INJECTION, SOLUTION INTRAVENOUS EVERY 5 MIN PRN
Status: DISCONTINUED | OUTPATIENT
Start: 2020-11-13 | End: 2020-11-13

## 2020-11-13 RX ORDER — SENNA AND DOCUSATE SODIUM 50; 8.6 MG/1; MG/1
2 TABLET, FILM COATED ORAL DAILY
Qty: 20 TABLET | Refills: 1 | Status: ON HOLD | OUTPATIENT
Start: 2020-11-13 | End: 2021-01-24

## 2020-11-13 RX ORDER — MEPERIDINE HYDROCHLORIDE 25 MG/ML
12.5 INJECTION INTRAMUSCULAR; INTRAVENOUS; SUBCUTANEOUS AS NEEDED
Status: DISCONTINUED | OUTPATIENT
Start: 2020-11-13 | End: 2020-11-13

## 2020-11-13 RX ORDER — LIDOCAINE HYDROCHLORIDE 10 MG/ML
INJECTION, SOLUTION EPIDURAL; INFILTRATION; INTRACAUDAL; PERINEURAL AS NEEDED
Status: DISCONTINUED | OUTPATIENT
Start: 2020-11-13 | End: 2020-11-13 | Stop reason: SURG

## 2020-11-13 RX ORDER — HYDROMORPHONE HYDROCHLORIDE 1 MG/ML
0.4 INJECTION, SOLUTION INTRAMUSCULAR; INTRAVENOUS; SUBCUTANEOUS EVERY 5 MIN PRN
Status: DISCONTINUED | OUTPATIENT
Start: 2020-11-13 | End: 2020-11-13

## 2020-11-13 RX ORDER — SODIUM CHLORIDE, SODIUM LACTATE, POTASSIUM CHLORIDE, CALCIUM CHLORIDE 600; 310; 30; 20 MG/100ML; MG/100ML; MG/100ML; MG/100ML
INJECTION, SOLUTION INTRAVENOUS CONTINUOUS
Status: DISCONTINUED | OUTPATIENT
Start: 2020-11-13 | End: 2020-11-13

## 2020-11-13 RX ORDER — OXYCODONE HYDROCHLORIDE AND ACETAMINOPHEN 5; 325 MG/1; MG/1
1-2 TABLET ORAL EVERY 6 HOURS PRN
Qty: 40 TABLET | Refills: 0 | Status: ON HOLD | OUTPATIENT
Start: 2020-11-13 | End: 2021-01-24 | Stop reason: ALTCHOICE

## 2020-11-13 RX ORDER — DEXAMETHASONE SODIUM PHOSPHATE 10 MG/ML
INJECTION, SOLUTION INTRAMUSCULAR; INTRAVENOUS AS NEEDED
Status: DISCONTINUED | OUTPATIENT
Start: 2020-11-13 | End: 2020-11-13 | Stop reason: SURG

## 2020-11-13 RX ORDER — CEFAZOLIN SODIUM 1 G/3ML
INJECTION, POWDER, FOR SOLUTION INTRAMUSCULAR; INTRAVENOUS AS NEEDED
Status: DISCONTINUED | OUTPATIENT
Start: 2020-11-13 | End: 2020-11-13 | Stop reason: SURG

## 2020-11-13 RX ORDER — DEXAMETHASONE SODIUM PHOSPHATE 4 MG/ML
VIAL (ML) INJECTION AS NEEDED
Status: DISCONTINUED | OUTPATIENT
Start: 2020-11-13 | End: 2020-11-13 | Stop reason: SURG

## 2020-11-13 RX ORDER — BUPIVACAINE HYDROCHLORIDE AND EPINEPHRINE 5; 5 MG/ML; UG/ML
INJECTION, SOLUTION EPIDURAL; INTRACAUDAL; PERINEURAL AS NEEDED
Status: DISCONTINUED | OUTPATIENT
Start: 2020-11-13 | End: 2020-11-13 | Stop reason: SURG

## 2020-11-13 RX ORDER — DIPHENHYDRAMINE HYDROCHLORIDE 50 MG/ML
12.5 INJECTION INTRAMUSCULAR; INTRAVENOUS AS NEEDED
Status: DISCONTINUED | OUTPATIENT
Start: 2020-11-13 | End: 2020-11-13

## 2020-11-13 RX ORDER — NALOXONE HYDROCHLORIDE 0.4 MG/ML
80 INJECTION, SOLUTION INTRAMUSCULAR; INTRAVENOUS; SUBCUTANEOUS AS NEEDED
Status: DISCONTINUED | OUTPATIENT
Start: 2020-11-13 | End: 2020-11-13

## 2020-11-13 RX ORDER — ONDANSETRON 4 MG/1
4 TABLET, FILM COATED ORAL EVERY 8 HOURS PRN
Qty: 20 TABLET | Refills: 0 | Status: ON HOLD | OUTPATIENT
Start: 2020-11-13 | End: 2021-01-24

## 2020-11-13 RX ORDER — ONDANSETRON 2 MG/ML
INJECTION INTRAMUSCULAR; INTRAVENOUS AS NEEDED
Status: DISCONTINUED | OUTPATIENT
Start: 2020-11-13 | End: 2020-11-13 | Stop reason: SURG

## 2020-11-13 RX ORDER — METOCLOPRAMIDE HYDROCHLORIDE 5 MG/ML
10 INJECTION INTRAMUSCULAR; INTRAVENOUS AS NEEDED
Status: DISCONTINUED | OUTPATIENT
Start: 2020-11-13 | End: 2020-11-13

## 2020-11-13 RX ORDER — ACETAMINOPHEN 500 MG
1000 TABLET ORAL ONCE
Status: CANCELLED | OUTPATIENT
Start: 2020-11-13 | End: 2020-11-13

## 2020-11-13 RX ORDER — ACETAMINOPHEN 325 MG/1
650 TABLET ORAL ONCE
Status: DISCONTINUED | OUTPATIENT
Start: 2020-11-13 | End: 2020-11-13

## 2020-11-13 RX ADMIN — DEXAMETHASONE SODIUM PHOSPHATE 4 MG: 4 MG/ML VIAL (ML) INJECTION at 11:59:00

## 2020-11-13 RX ADMIN — BUPIVACAINE HYDROCHLORIDE AND EPINEPHRINE 10 ML: 2.5; 5 INJECTION, SOLUTION EPIDURAL; INFILTRATION; INTRACAUDAL; PERINEURAL at 11:57:00

## 2020-11-13 RX ADMIN — CEFAZOLIN SODIUM 2 G: 1 INJECTION, POWDER, FOR SOLUTION INTRAMUSCULAR; INTRAVENOUS at 12:04:00

## 2020-11-13 RX ADMIN — LIDOCAINE HYDROCHLORIDE 50 MG: 10 INJECTION, SOLUTION EPIDURAL; INFILTRATION; INTRACAUDAL; PERINEURAL at 11:54:00

## 2020-11-13 RX ADMIN — ONDANSETRON 4 MG: 2 INJECTION INTRAMUSCULAR; INTRAVENOUS at 13:53:00

## 2020-11-13 RX ADMIN — BUPIVACAINE HYDROCHLORIDE AND EPINEPHRINE 10 ML: 5; 5 INJECTION, SOLUTION EPIDURAL; INTRACAUDAL; PERINEURAL at 11:57:00

## 2020-11-13 RX ADMIN — BUPRENORPHINE HYDROCHLORIDE 150 MCG: 0.32 INJECTION INTRAMUSCULAR; INTRAVENOUS at 11:57:00

## 2020-11-13 RX ADMIN — DEXAMETHASONE SODIUM PHOSPHATE 2 MG: 10 INJECTION, SOLUTION INTRAMUSCULAR; INTRAVENOUS at 11:57:00

## 2020-11-13 RX ADMIN — MIDAZOLAM HYDROCHLORIDE 1 MG: 1 INJECTION INTRAMUSCULAR; INTRAVENOUS at 11:51:00

## 2020-11-13 NOTE — ANESTHESIA POSTPROCEDURE EVALUATION
4 [facility] Patient Status:  Hospital Outpatient Surgery   Age/Gender 70year old male MRN [id number]   [facility] SURGERY Attending Brittanie Alcaraz MD   [facility] Day # 0 PCP Juwan Quigley MD       Anesthesia Post-

## 2020-11-13 NOTE — ANESTHESIA PREPROCEDURE EVALUATION
PRE-OP EVALUATION    Patient Name: Bharat Park    Pre-op Diagnosis: Nontraumatic complete tear of left rotator cuff [M75.122]  Injury of tendon of long head of left biceps, subsequent encounter [S46.641D]    Procedure(s):  LEFT SHOULDER ARTHROSCOPY univ of cinn    • KNEE REPLACEMENT SURGERY Right 2018    x2   • UPPER GI ENDOSCOPY,EXAM       Social History    Tobacco Use      Smoking status: Former Smoker        Quit date: 2010        Years since quitting: 10.8      Smokeless tobacco: Never Used    Al Admission:  **None**

## 2020-11-13 NOTE — ANESTHESIA PROCEDURE NOTES
Regional Block  Performed by: Ingrid Lao MD  Authorized by: Ingrid Lao MD       General Information and Staff    Start Time:  11/13/2020 11:49 AM  End Time:  11/13/2020 11:58 AM  Anesthesiologist:  Ingrid Lao MD  Performed by:   Anesthesiologist  P buprenorphine 150 mcg + PF dexamethasone 2 mg     Prior to beginning of block, pt requested urinal in the OR. Process was stopped and a urinal was obtained. Pt attempted to urinate unsuccessfully.   Process of preparing to perform block was then restarted

## 2020-11-13 NOTE — ANESTHESIA PROCEDURE NOTES
Airway  Urgency: elective    Airway not difficult    General Information and Staff    Patient location during procedure: OR  Anesthesiologist: Mian Momin MD  Performed: anesthesiologist     Indications and Patient Condition  Indications for airway manag

## 2020-11-13 NOTE — INTERVAL H&P NOTE
Pre-op Diagnosis: Nontraumatic complete tear of left rotator cuff [M75.122]  Injury of tendon of long head of left biceps, subsequent encounter [S46.102D]    The above referenced H&P was reviewed by Yoko Marina MD on 11/13/2020, the patient was exam

## 2020-11-22 NOTE — OPERATIVE REPORT
Operative Note  [address]    Patient Name: Anibal Knife    Procedure Date: 2020    : 8/10/1945    MRN: [id number]    Preoperative Diagnosis: Nontraumatic complete tear of left rotator cuff [M75.122]  Injury of tendon of long he white fraying. The inferior labrum was intact. The rotator cuff was inspected. There was a massive rotator cuff tear of the entire supraspinatus and portion of the infraspinatus tendon. The posterior labrum demonstrated some moderate fraying.   An anter lateral portal and the repair inspected. There were no gaps or dog ears. The portals were then closed with 3-0 nylon horizontal mattresses. A sterile dressing was applied. A postoperative shoulder sling and abduction pillow were applied.   The patient

## 2020-12-06 PROBLEM — M25.551 RIGHT HIP PAIN: Status: ACTIVE | Noted: 2020-12-06

## 2020-12-09 ENCOUNTER — HOSPITAL ENCOUNTER (EMERGENCY)
Facility: HOSPITAL | Age: 75
Discharge: HOME | End: 2020-12-09
Attending: EMERGENCY MEDICINE
Payer: MEDICARE

## 2020-12-09 VITALS
OXYGEN SATURATION: 100 % | SYSTOLIC BLOOD PRESSURE: 148 MMHG | DIASTOLIC BLOOD PRESSURE: 96 MMHG | WEIGHT: 157 LBS | RESPIRATION RATE: 18 BRPM | TEMPERATURE: 98 F | HEART RATE: 85 BPM | BODY MASS INDEX: 21.98 KG/M2 | HEIGHT: 71 IN

## 2020-12-09 DIAGNOSIS — N18.9 CHRONIC KIDNEY DISEASE, UNSPECIFIED CKD STAGE: Primary | ICD-10-CM

## 2020-12-09 DIAGNOSIS — R79.89 ELEVATED SERUM CREATININE: ICD-10-CM

## 2020-12-09 PROCEDURE — 99284 EMERGENCY DEPT VISIT MOD MDM: CPT

## 2020-12-09 PROCEDURE — 85025 COMPLETE CBC W/AUTO DIFF WBC: CPT | Performed by: EMERGENCY MEDICINE

## 2020-12-09 PROCEDURE — 93005 ELECTROCARDIOGRAM TRACING: CPT

## 2020-12-09 PROCEDURE — 99283 EMERGENCY DEPT VISIT LOW MDM: CPT

## 2020-12-09 PROCEDURE — 36415 COLL VENOUS BLD VENIPUNCTURE: CPT

## 2020-12-09 PROCEDURE — 80048 BASIC METABOLIC PNL TOTAL CA: CPT | Performed by: EMERGENCY MEDICINE

## 2020-12-09 PROCEDURE — 93010 ELECTROCARDIOGRAM REPORT: CPT

## 2020-12-10 NOTE — ED PROVIDER NOTES
Patient Seen in: [facility] Emergency Department      History   Patient presents with:  Abnormal Labs    Stated Complaint: abnormal labs    HPI    81-year-old male with multiple medical problems including chronic kidney disease presents emergency ro Review of Systems    Positive for stated complaint: abnormal labs  Other systems are as noted in HPI. Constitutional and vital signs reviewed. All other systems reviewed and negative except as noted above.     Physical Exam     ED Triage Vitals ---------                               -----------         ------                     CBC W/ DIFFERENTIAL[[id number]]          Abnormal            Final result                 Please view results for these tests on the individual orders.    RAINBOW DRAW B

## 2020-12-10 NOTE — ED INITIAL ASSESSMENT (HPI)
Patient seen at [address] [place] [address] for blood work. Informed by PMD to come in for abnormal kidney function.

## 2021-01-24 ENCOUNTER — APPOINTMENT (OUTPATIENT)
Dept: ULTRASOUND IMAGING | Facility: HOSPITAL | Age: 76
End: 2021-01-24
Attending: EMERGENCY MEDICINE
Payer: MEDICARE

## 2021-01-24 ENCOUNTER — APPOINTMENT (OUTPATIENT)
Dept: NUCLEAR MEDICINE | Facility: HOSPITAL | Age: 76
End: 2021-01-24
Attending: EMERGENCY MEDICINE
Payer: MEDICARE

## 2021-01-24 ENCOUNTER — APPOINTMENT (OUTPATIENT)
Dept: GENERAL RADIOLOGY | Facility: HOSPITAL | Age: 76
End: 2021-01-24
Attending: EMERGENCY MEDICINE
Payer: MEDICARE

## 2021-01-24 ENCOUNTER — HOSPITAL ENCOUNTER (OUTPATIENT)
Facility: HOSPITAL | Age: 76
Setting detail: OBSERVATION
Discharge: HOME | End: 2021-01-25
Attending: EMERGENCY MEDICINE | Admitting: INTERNAL MEDICINE
Payer: MEDICARE

## 2021-01-24 DIAGNOSIS — R07.9 CHEST PAIN, RULE OUT ACUTE MYOCARDIAL INFARCTION: Primary | ICD-10-CM

## 2021-01-24 PROBLEM — R79.89 AZOTEMIA: Status: ACTIVE | Noted: 2021-01-24

## 2021-01-24 PROBLEM — D69.6 THROMBOCYTOPENIA (HCC): Status: ACTIVE | Noted: 2021-01-24

## 2021-01-24 LAB
ALBUMIN SERPL-MCNC: 3.8 G/DL (ref 3.4–5)
ALBUMIN/GLOB SERPL: 0.9 {RATIO} (ref 1–2)
ALP LIVER SERPL-CCNC: 211 U/L
ALT SERPL-CCNC: 18 U/L
ANION GAP SERPL CALC-SCNC: 6 MMOL/L (ref 0–18)
AST SERPL-CCNC: 21 U/L (ref 15–37)
ATRIAL RATE: 93 BPM
BASOPHILS # BLD AUTO: 0.04 X10(3) UL (ref 0–0.2)
BASOPHILS NFR BLD AUTO: 0.9 %
BILIRUB SERPL-MCNC: 0.5 MG/DL (ref 0.1–2)
BUN BLD-MCNC: 56 MG/DL (ref 7–18)
BUN/CREAT SERPL: 20.1 (ref 10–20)
CALCIUM BLD-MCNC: 9.2 MG/DL (ref 8.5–10.1)
CHLORIDE SERPL-SCNC: 106 MMOL/L (ref 98–112)
CO2 SERPL-SCNC: 25 MMOL/L (ref 21–32)
CREAT BLD-MCNC: 2.79 MG/DL
D-DIMER: 2.3 UG/ML FEU (ref ?–0.73)
DEPRECATED RDW RBC AUTO: 48.8 FL (ref 35.1–46.3)
EOSINOPHIL # BLD AUTO: 0.29 X10(3) UL (ref 0–0.7)
EOSINOPHIL NFR BLD AUTO: 6.8 %
ERYTHROCYTE [DISTWIDTH] IN BLOOD BY AUTOMATED COUNT: 16.6 % (ref 11–15)
GLOBULIN PLAS-MCNC: 4.1 G/DL (ref 2.8–4.4)
GLUCOSE BLD-MCNC: 95 MG/DL (ref 70–99)
HCT VFR BLD AUTO: 34.7 %
HGB BLD-MCNC: 12.2 G/DL
IMM GRANULOCYTES # BLD AUTO: 0.02 X10(3) UL (ref 0–1)
IMM GRANULOCYTES NFR BLD: 0.5 %
LYMPHOCYTES # BLD AUTO: 1.69 X10(3) UL (ref 1–4)
LYMPHOCYTES NFR BLD AUTO: 39.9 %
M PROTEIN MFR SERPL ELPH: 7.9 G/DL (ref 6.4–8.2)
MCH RBC QN AUTO: 28.8 PG (ref 26–34)
MCHC RBC AUTO-ENTMCNC: 35.2 G/DL (ref 31–37)
MCV RBC AUTO: 81.8 FL
MONOCYTES # BLD AUTO: 0.34 X10(3) UL (ref 0.1–1)
MONOCYTES NFR BLD AUTO: 8 %
NEUTROPHILS # BLD AUTO: 1.86 X10 (3) UL (ref 1.5–7.7)
NEUTROPHILS # BLD AUTO: 1.86 X10(3) UL (ref 1.5–7.7)
NEUTROPHILS NFR BLD AUTO: 43.9 %
OSMOLALITY SERPL CALC.SUM OF ELEC: 299 MOSM/KG (ref 275–295)
P AXIS: 63 DEGREES
P-R INTERVAL: 160 MS
PLATELET # BLD AUTO: 133 10(3)UL (ref 150–450)
POTASSIUM SERPL-SCNC: 3.9 MMOL/L (ref 3.5–5.1)
Q-T INTERVAL: 406 MS
QRS DURATION: 136 MS
QTC CALCULATION (BEZET): 504 MS
R AXIS: 83 DEGREES
RBC # BLD AUTO: 4.24 X10(6)UL
SARS-COV-2 RNA RESP QL NAA+PROBE: NOT DETECTED
SODIUM SERPL-SCNC: 137 MMOL/L (ref 136–145)
T AXIS: -72 DEGREES
TROPONIN I SERPL-MCNC: <0.045 NG/ML (ref ?–0.04)
VENTRICULAR RATE: 93 BPM
WBC # BLD AUTO: 4.2 X10(3) UL (ref 4–11)

## 2021-01-24 PROCEDURE — 93971 EXTREMITY STUDY: CPT | Performed by: EMERGENCY MEDICINE

## 2021-01-24 PROCEDURE — 73610 X-RAY EXAM OF ANKLE: CPT | Performed by: EMERGENCY MEDICINE

## 2021-01-24 PROCEDURE — 78580 LUNG PERFUSION IMAGING: CPT | Performed by: EMERGENCY MEDICINE

## 2021-01-24 PROCEDURE — 84484 ASSAY OF TROPONIN QUANT: CPT | Performed by: INTERNAL MEDICINE

## 2021-01-24 PROCEDURE — 93005 ELECTROCARDIOGRAM TRACING: CPT

## 2021-01-24 PROCEDURE — 99285 EMERGENCY DEPT VISIT HI MDM: CPT

## 2021-01-24 PROCEDURE — 93010 ELECTROCARDIOGRAM REPORT: CPT

## 2021-01-24 PROCEDURE — 71045 X-RAY EXAM CHEST 1 VIEW: CPT | Performed by: EMERGENCY MEDICINE

## 2021-01-24 PROCEDURE — 85025 COMPLETE CBC W/AUTO DIFF WBC: CPT | Performed by: EMERGENCY MEDICINE

## 2021-01-24 PROCEDURE — 85379 FIBRIN DEGRADATION QUANT: CPT | Performed by: EMERGENCY MEDICINE

## 2021-01-24 PROCEDURE — 80053 COMPREHEN METABOLIC PANEL: CPT | Performed by: EMERGENCY MEDICINE

## 2021-01-24 PROCEDURE — 36415 COLL VENOUS BLD VENIPUNCTURE: CPT

## 2021-01-24 PROCEDURE — 84484 ASSAY OF TROPONIN QUANT: CPT | Performed by: EMERGENCY MEDICINE

## 2021-01-24 RX ORDER — TACROLIMUS 1 MG/1
4 CAPSULE ORAL EVERY MORNING
Status: DISCONTINUED | OUTPATIENT
Start: 2021-01-24 | End: 2021-01-24

## 2021-01-24 RX ORDER — TACROLIMUS 1 MG/1
3 CAPSULE ORAL EVERY EVENING
Status: DISCONTINUED | OUTPATIENT
Start: 2021-01-24 | End: 2021-01-25

## 2021-01-24 RX ORDER — CHLORAL HYDRATE 500 MG
CAPSULE ORAL 2 TIMES DAILY
Status: DISCONTINUED | OUTPATIENT
Start: 2021-01-24 | End: 2021-01-24 | Stop reason: RX

## 2021-01-24 RX ORDER — PRAVASTATIN SODIUM 20 MG
20 TABLET ORAL NIGHTLY
Status: DISCONTINUED | OUTPATIENT
Start: 2021-01-24 | End: 2021-01-25

## 2021-01-24 RX ORDER — DOCUSATE SODIUM 100 MG/1
100 CAPSULE, LIQUID FILLED ORAL 2 TIMES DAILY
COMMUNITY
End: 2021-07-07

## 2021-01-24 RX ORDER — TACROLIMUS 1 MG/1
4 CAPSULE ORAL EVERY MORNING
Status: DISCONTINUED | OUTPATIENT
Start: 2021-01-25 | End: 2021-01-25

## 2021-01-24 RX ORDER — SUCRALFATE 1 G/1
1 TABLET ORAL
Status: DISCONTINUED | OUTPATIENT
Start: 2021-01-24 | End: 2021-01-25

## 2021-01-24 RX ORDER — LATANOPROST 50 UG/ML
1 SOLUTION/ DROPS OPHTHALMIC NIGHTLY
Status: DISCONTINUED | OUTPATIENT
Start: 2021-01-24 | End: 2021-01-25

## 2021-01-24 RX ORDER — LISINOPRIL 20 MG/1
20 TABLET ORAL DAILY
Status: DISCONTINUED | OUTPATIENT
Start: 2021-01-24 | End: 2021-01-25

## 2021-01-24 RX ORDER — TACROLIMUS 1 MG/1
CAPSULE ORAL
COMMUNITY
Start: 2020-01-27 | End: 2021-10-12

## 2021-01-24 RX ORDER — ACETAMINOPHEN 325 MG/1
650 TABLET ORAL EVERY 6 HOURS PRN
Status: DISCONTINUED | OUTPATIENT
Start: 2021-01-24 | End: 2021-01-25

## 2021-01-24 RX ORDER — DOCUSATE SODIUM 100 MG/1
100 CAPSULE, LIQUID FILLED ORAL 2 TIMES DAILY
Status: DISCONTINUED | OUTPATIENT
Start: 2021-01-24 | End: 2021-01-25

## 2021-01-24 RX ORDER — LATANOPROST 50 UG/ML
1 SOLUTION/ DROPS OPHTHALMIC NIGHTLY
Status: DISCONTINUED | OUTPATIENT
Start: 2021-01-24 | End: 2021-01-24

## 2021-01-24 RX ORDER — MYCOPHENOLIC ACID 180 MG/1
180 TABLET, DELAYED RELEASE ORAL
Status: DISCONTINUED | OUTPATIENT
Start: 2021-01-24 | End: 2021-01-25

## 2021-01-24 RX ORDER — FUROSEMIDE 20 MG/1
20 TABLET ORAL DAILY
Status: DISCONTINUED | OUTPATIENT
Start: 2021-01-24 | End: 2021-01-25

## 2021-01-24 RX ORDER — DORZOLAMIDE HYDROCHLORIDE AND TIMOLOL MALEATE 20; 5 MG/ML; MG/ML
1 SOLUTION/ DROPS OPHTHALMIC 2 TIMES DAILY
Status: DISCONTINUED | OUTPATIENT
Start: 2021-01-24 | End: 2021-01-25

## 2021-01-24 NOTE — PROGRESS NOTES
01/24/21 1615   Clinical Encounter Type   Visited With Health care provider  Carmenza Roger spoke to nurse re pt. Per nurse, pt is forgetful at times, but is alert. Brennon Lyle left blank HCPOA form in chart.   Nurse to page chap at 2000 when wife is available.)   Rou

## 2021-01-24 NOTE — CONSULTS
[facility] Cardiology Consultation    Cass Tip Patient Status:  Emergency    8/10/1945 MRN [id number]   Location [address] Attending Christian Rae MD   Hosp Day # 0 PCP Vicente Chavis MD     Reason for Consultation:  C (71.2 kg)  11/13/20 : 160 lb 4.4 oz (72.7 kg)       01/24/21  0715 01/24/21  0845 01/24/21  1000 01/24/21  1100   BP: (!) 148/96 (!) 170/127 (!) 162/112 (!) 164/111   Pulse: 82 96 82 82   Resp: 14 20 14 20   Temp:       TempSrc:       SpO2: 99% 99% 96% 99% pulmonary embolism. Impression:    1. Right-sided chest pain. Atypical for angina. Elevated dimer. PE was ruled out via V/Q scan. 2. Elevated bp    3. ,s/p heart transplant, 2013. 4. CKD    Recommend:    1. Admit to telemetry  2.  Serial

## 2021-01-24 NOTE — PLAN OF CARE
Received patient from ED ~1300. Denies any chest pain or shortness of breath on arrival.   Afebrile. Alert and oriented x4. Sinus rhythm. Oxygen saturation 98% on room air. Plan to follow serial troponins per cardiology.   Spiritual care consult ordered to

## 2021-01-24 NOTE — ED PROVIDER NOTES
Patient Seen in: [facility] Emergency Department      History   Patient presents with:  Chest Pain Angina    Stated Complaint: chest pain    HPI/Subjective:   HPI    27-year-old with history of chronic kidney disease, hypertension, heart transplant 2010        Years since quittin.0      Smokeless tobacco: Never Used    Alcohol use:  Yes      Alcohol/week: 1.0 standard drinks      Types: 1 Cans of beer per week      Frequency: 4 or more times a week      Drinks per session: 1 or 2      Binge frequ All other components within normal limits   D-DIMER - Abnormal; Notable for the following components:    D-Dimer 2.30 (*)     All other components within normal limits   BASIC METABOLIC PANEL (8) - Abnormal; Notable for the following components:    BUN 59 individual orders. RAINBOW DRAW BLUE   RAINBOW DRAW LAVENDER   RAINBOW DRAW LIGHT GREEN   RAINBOW DRAW GOLD     EKG    Rate, intervals and axes as noted on EKG Report. Rate: 93  Rhythm: Sinus Rhythm  Reading: Right bundle branch block.   Inferior lateral of 1/25/2021 11:49 AM    START taking these medications    hydrOXYzine HCl 25 MG Oral Tab  Take 1 tablet (25 mg total) by mouth every 8 (eight) hours as needed for Itching., Normal, Disp-21 tablet, R-0                            Present on Admission  Date

## 2021-01-24 NOTE — ED NOTES
Updated floor nurse about pt going going upstairs and ER MD okay with current blood pressure, ordered home antihypertensive medication and awaiting on pharmacy to deliver.

## 2021-01-24 NOTE — PROGRESS NOTES
Nurse to page  at 2000 when patient's wife is available to review HCPOA document.     Lourdes GARCIA

## 2021-01-24 NOTE — H&P
[facility] Hospitalist History and Physical      Patient presents with:  Chest Pain Angina       PCP: Jay Duncan MD      History of Present Illness: Patient is a 70year old male with PMH sig for heart transplant 2006 in [address] [place] [address] , CKD 4, HTN, and HLD who both eyes 2 (two) times daily. , Disp: , Rfl:     •  furosemide 20 MG Oral Tab, Take 20 mg by mouth daily.   , Disp: , Rfl:     •  Bimatoprost 0.01 % Ophthalmic Solution, Place 1 drop into the right eye 2 (two) times a day., Disp: , Rfl:     •  latanoprost 0 focal neurologic deficits  HEENT:  EOMI, PERRLA, OP clear, MMM  Pulm: Lungs clear bilaterally, normal respiratory effort  CV: Heart with regular rate and rhythm, no murmur. Normal PMI.     Abd: Abdomen soft, nontender, nondistended, no organomegaly, bowel Finalized by (CST): Carol Ann Syed MD on 1/24/2021 at 8:28 AM       Us Venous Doppler Leg Right - Diag Img (cpt=93971)    Result Date: 1/24/2021  PROCEDURE:  US VENOUS DOPPLER LEG RIGHT - DIAG IMG (CPT=93971)  COMPARISON:  None.   INDICATIONS:  eval for DVT  TE Portable  (cpt=71045)    Result Date: 1/24/2021  PROCEDURE:  XR CHEST AP PORTABLE  (CPT=71045)  TECHNIQUE:  AP chest radiograph was obtained. COMPARISON:  None.   INDICATIONS:  chest pain  PATIENT STATED HISTORY: (As transcribed by Technologist)  Patient c

## 2021-01-24 NOTE — PROGRESS NOTES
Pharmacy Note: Dietary Supplement Discontinuation Per Policy    Fish oil has been discontinued on [employer] per policy. This supplement may be restarted upon discharge using the medication reconciliation process.     Thank you,   [employer]

## 2021-01-25 VITALS
HEIGHT: 71 IN | HEART RATE: 72 BPM | DIASTOLIC BLOOD PRESSURE: 74 MMHG | BODY MASS INDEX: 22.51 KG/M2 | TEMPERATURE: 98 F | RESPIRATION RATE: 16 BRPM | OXYGEN SATURATION: 95 % | WEIGHT: 160.81 LBS | SYSTOLIC BLOOD PRESSURE: 98 MMHG

## 2021-01-25 LAB
ANION GAP SERPL CALC-SCNC: 6 MMOL/L (ref 0–18)
BASOPHILS # BLD AUTO: 0.03 X10(3) UL (ref 0–0.2)
BASOPHILS NFR BLD AUTO: 0.9 %
BUN BLD-MCNC: 59 MG/DL (ref 7–18)
BUN/CREAT SERPL: 22.3 (ref 10–20)
CALCIUM BLD-MCNC: 8.8 MG/DL (ref 8.5–10.1)
CHLORIDE SERPL-SCNC: 109 MMOL/L (ref 98–112)
CO2 SERPL-SCNC: 23 MMOL/L (ref 21–32)
CREAT BLD-MCNC: 2.65 MG/DL
DEPRECATED RDW RBC AUTO: 48.8 FL (ref 35.1–46.3)
EOSINOPHIL # BLD AUTO: 0.27 X10(3) UL (ref 0–0.7)
EOSINOPHIL NFR BLD AUTO: 7.8 %
ERYTHROCYTE [DISTWIDTH] IN BLOOD BY AUTOMATED COUNT: 16.5 % (ref 11–15)
GLUCOSE BLD-MCNC: 93 MG/DL (ref 70–99)
HCT VFR BLD AUTO: 30.9 %
HGB BLD-MCNC: 10.8 G/DL
IMM GRANULOCYTES # BLD AUTO: 0.01 X10(3) UL (ref 0–1)
IMM GRANULOCYTES NFR BLD: 0.3 %
LYMPHOCYTES # BLD AUTO: 1.32 X10(3) UL (ref 1–4)
LYMPHOCYTES NFR BLD AUTO: 38 %
MCH RBC QN AUTO: 28.6 PG (ref 26–34)
MCHC RBC AUTO-ENTMCNC: 35 G/DL (ref 31–37)
MCV RBC AUTO: 81.7 FL
MONOCYTES # BLD AUTO: 0.33 X10(3) UL (ref 0.1–1)
MONOCYTES NFR BLD AUTO: 9.5 %
NEUTROPHILS # BLD AUTO: 1.51 X10 (3) UL (ref 1.5–7.7)
NEUTROPHILS # BLD AUTO: 1.51 X10(3) UL (ref 1.5–7.7)
NEUTROPHILS NFR BLD AUTO: 43.5 %
OSMOLALITY SERPL CALC.SUM OF ELEC: 302 MOSM/KG (ref 275–295)
PLATELET # BLD AUTO: 129 10(3)UL (ref 150–450)
POTASSIUM SERPL-SCNC: 4.2 MMOL/L (ref 3.5–5.1)
RBC # BLD AUTO: 3.78 X10(6)UL
SODIUM SERPL-SCNC: 138 MMOL/L (ref 136–145)
TROPONIN I SERPL-MCNC: <0.045 NG/ML (ref ?–0.04)
WBC # BLD AUTO: 3.5 X10(3) UL (ref 4–11)

## 2021-01-25 PROCEDURE — 85025 COMPLETE CBC W/AUTO DIFF WBC: CPT | Performed by: INTERNAL MEDICINE

## 2021-01-25 PROCEDURE — 84484 ASSAY OF TROPONIN QUANT: CPT | Performed by: INTERNAL MEDICINE

## 2021-01-25 PROCEDURE — 80048 BASIC METABOLIC PNL TOTAL CA: CPT | Performed by: INTERNAL MEDICINE

## 2021-01-25 RX ORDER — LISINOPRIL 20 MG/1
20 TABLET ORAL DAILY
Qty: 90 TABLET | Refills: 3 | Status: SHIPPED | OUTPATIENT
Start: 2021-01-25 | End: 2021-05-25 | Stop reason: ALTCHOICE

## 2021-01-25 RX ORDER — HYDROXYZINE HYDROCHLORIDE 25 MG/1
25 TABLET, FILM COATED ORAL EVERY 8 HOURS PRN
Qty: 21 TABLET | Refills: 0 | Status: SHIPPED | OUTPATIENT
Start: 2021-01-25 | End: 2021-03-26

## 2021-01-25 NOTE — DISCHARGE SUMMARY
General Medicine Discharge Summary     Patient ID:  Hortensia Jain  70year old  8/10/1945    Admit date: 1/24/2021    Discharge date and time: 1/25/2021    Attending Physician: Rolando Lilly, by mouth daily. CONTINUE these medications which have NOT CHANGED    Mycophenolate Sodium (MYCOPHENOLIC ACID OR)  Take 320 mg by mouth 2 (two) times a day. docusate sodium 100 MG Oral Cap  Take 100 mg by mouth 2 (two) times daily.     tacrolimus 1 M no focal deficits       Total time coordinating care for discharge: Less than 30 minutes    Madolyn Boast DO  [facility]

## 2021-01-25 NOTE — PROGRESS NOTES
[facility] [address] [place] [address] Cardiology Progress Note        Anna Marie Wang Patient Status:  Observation    8/10/1945 MRN [id number]   [facility] 8NE-A Attending Volodymyr Cooley, DO   Hosp Day # 0 PCP MD Karlene Paz S3, S4, rub, click. No murmur. Lungs: Clear to auscultation and percussion. Abdomen: Soft, non-tender. Extremities: No LE edema. No clubbing or cyanosis. Neurologic: Alert and oriented, normal affect. Skin: Warm and dry.            LABS:      HEM: AM

## 2021-01-25 NOTE — PLAN OF CARE
Pt received at 0730 resting in bed. A&Ox4. Room air saturating 100%. Sinus rhythm on the monitor. No chest pain or shortness of breath. Await cardiology recommendations. Troponins have been negative. Up ad heike. Oriented to room and call light.  Updated on p

## 2021-01-25 NOTE — PLAN OF CARE
Alert and oriented, forgetful at times. Impaired vision, hx of glaucoma, glasses confirmed at bedside. RA, saturations >95%. Denies shortness of breath. Denies chest pain, denies chest pain since admission.  States he came to the ED when he developed sharp

## 2021-01-25 NOTE — PLAN OF CARE
Pt being discharged to home. Being driven home by wife. Reviewed all medications with patient and wife. Instructed to  newly prescribed medications from his [address]. Instructed to follow up with cardiology at scheduled appt in February.  Amita Erazo

## 2021-01-26 NOTE — PAYOR COMM NOTE
--------------  DISCHARGE REVIEW    Payor: [address] [place], [place] [address]Tristen Carmi #:  [id number]  Authorization Number: [id number]      Discharge Date: 1/25/2021 12:33 PM     Admitting Physician: Trenton Brown MD  Attending Physician:  No att. providers fo

## 2021-03-12 PROBLEM — R41.3 MEMORY DIFFICULTIES: Status: ACTIVE | Noted: 2021-03-12

## 2021-03-20 ENCOUNTER — EKG ENCOUNTER (OUTPATIENT)
Dept: LAB | Age: 76
End: 2021-03-20
Attending: ORTHOPAEDIC SURGERY
Payer: MEDICARE

## 2021-03-20 ENCOUNTER — LAB ENCOUNTER (OUTPATIENT)
Dept: LAB | Age: 76
End: 2021-03-20
Attending: ORTHOPAEDIC SURGERY
Payer: MEDICARE

## 2021-03-20 DIAGNOSIS — Z01.818 PRE-OP TESTING: ICD-10-CM

## 2021-03-20 DIAGNOSIS — Z01.818 PRE-OP TESTING: Primary | ICD-10-CM

## 2021-03-20 LAB
ALBUMIN SERPL-MCNC: 4.1 G/DL (ref 3.4–5)
ALBUMIN/GLOB SERPL: 1 {RATIO} (ref 1–2)
ALP LIVER SERPL-CCNC: 201 U/L
ALT SERPL-CCNC: 21 U/L
ANION GAP SERPL CALC-SCNC: 6 MMOL/L (ref 0–18)
AST SERPL-CCNC: 22 U/L (ref 15–37)
BILIRUB SERPL-MCNC: 0.5 MG/DL (ref 0.1–2)
BUN BLD-MCNC: 59 MG/DL (ref 7–18)
BUN/CREAT SERPL: 21.7 (ref 10–20)
CALCIUM BLD-MCNC: 9.4 MG/DL (ref 8.5–10.1)
CHLORIDE SERPL-SCNC: 112 MMOL/L (ref 98–112)
CO2 SERPL-SCNC: 22 MMOL/L (ref 21–32)
CREAT BLD-MCNC: 2.72 MG/DL
GLOBULIN PLAS-MCNC: 4 G/DL (ref 2.8–4.4)
GLUCOSE BLD-MCNC: 91 MG/DL (ref 70–99)
M PROTEIN MFR SERPL ELPH: 8.1 G/DL (ref 6.4–8.2)
OSMOLALITY SERPL CALC.SUM OF ELEC: 306 MOSM/KG (ref 275–295)
PATIENT FASTING Y/N/NP: NO
POTASSIUM SERPL-SCNC: 5.1 MMOL/L (ref 3.5–5.1)
SODIUM SERPL-SCNC: 140 MMOL/L (ref 136–145)

## 2021-03-20 PROCEDURE — 80053 COMPREHEN METABOLIC PANEL: CPT

## 2021-03-20 PROCEDURE — 93010 ELECTROCARDIOGRAM REPORT: CPT | Performed by: INTERNAL MEDICINE

## 2021-03-20 PROCEDURE — 93005 ELECTROCARDIOGRAM TRACING: CPT

## 2021-03-20 PROCEDURE — 36415 COLL VENOUS BLD VENIPUNCTURE: CPT

## 2021-03-22 ENCOUNTER — ANESTHESIA EVENT (OUTPATIENT)
Dept: SURGERY | Facility: HOSPITAL | Age: 76
End: 2021-03-22
Payer: MEDICARE

## 2021-03-22 LAB
ATRIAL RATE: 85 BPM
P AXIS: 52 DEGREES
P-R INTERVAL: 162 MS
Q-T INTERVAL: 424 MS
QRS DURATION: 138 MS
QTC CALCULATION (BEZET): 504 MS
R AXIS: 80 DEGREES
T AXIS: -55 DEGREES
VENTRICULAR RATE: 85 BPM

## 2021-03-24 RX ORDER — ACETAMINOPHEN 500 MG
1000 TABLET ORAL ONCE
Status: CANCELLED | OUTPATIENT
Start: 2021-03-24 | End: 2021-03-24

## 2021-03-25 ENCOUNTER — ANESTHESIA (OUTPATIENT)
Dept: SURGERY | Facility: HOSPITAL | Age: 76
End: 2021-03-25
Payer: MEDICARE

## 2021-03-25 NOTE — PAT NURSING NOTE
Abnormal Ekg,history of heart transplant and additional health history reviewed by Dr. Eleanor Trujillo. He requests note of cardiac clearance pre op. Faxed request to and spoke w Dior/ office.  She will give message to MD. Also faxed request

## 2021-03-25 NOTE — PAT NURSING NOTE
Unable top successfully fax clearance request to Dr. Gris Shukla office but did speak w Nicole Sample at office this am. She will notify MD that clearance is needed.

## 2021-03-27 ENCOUNTER — LAB ENCOUNTER (OUTPATIENT)
Dept: LAB | Age: 76
End: 2021-03-27
Attending: ORTHOPAEDIC SURGERY
Payer: MEDICARE

## 2021-03-27 DIAGNOSIS — S46.002A ROTATOR CUFF INJURY, LEFT, INITIAL ENCOUNTER: ICD-10-CM

## 2021-03-28 LAB — SARS-COV-2 RNA RESP QL NAA+PROBE: NOT DETECTED

## 2021-04-02 ENCOUNTER — HOSPITAL ENCOUNTER (OUTPATIENT)
Facility: HOSPITAL | Age: 76
Setting detail: HOSPITAL OUTPATIENT SURGERY
Discharge: HOME | End: 2021-04-02
Attending: ORTHOPAEDIC SURGERY | Admitting: ORTHOPAEDIC SURGERY
Payer: MEDICARE

## 2021-04-02 VITALS
WEIGHT: 139.69 LBS | BODY MASS INDEX: 19.56 KG/M2 | HEART RATE: 81 BPM | HEIGHT: 71 IN | TEMPERATURE: 98 F | OXYGEN SATURATION: 100 % | DIASTOLIC BLOOD PRESSURE: 100 MMHG | SYSTOLIC BLOOD PRESSURE: 127 MMHG | RESPIRATION RATE: 13 BRPM

## 2021-04-02 DIAGNOSIS — S46.002A ROTATOR CUFF INJURY, LEFT, INITIAL ENCOUNTER: Primary | ICD-10-CM

## 2021-04-02 PROCEDURE — 76942 ECHO GUIDE FOR BIOPSY: CPT | Performed by: ANESTHESIOLOGY

## 2021-04-02 PROCEDURE — 85025 COMPLETE CBC W/AUTO DIFF WBC: CPT

## 2021-04-02 PROCEDURE — 0RPK44Z REMOVAL OF INTERNAL FIXATION DEVICE FROM LEFT SHOULDER JOINT, PERCUTANEOUS ENDOSCOPIC APPROACH: ICD-10-PCS | Performed by: ORTHOPAEDIC SURGERY

## 2021-04-02 PROCEDURE — 0LQ24ZZ REPAIR LEFT SHOULDER TENDON, PERCUTANEOUS ENDOSCOPIC APPROACH: ICD-10-PCS | Performed by: ORTHOPAEDIC SURGERY

## 2021-04-02 PROCEDURE — 80051 ELECTROLYTE PANEL: CPT | Performed by: ORTHOPAEDIC SURGERY

## 2021-04-02 PROCEDURE — 0RHK44Z INSERTION OF INTERNAL FIXATION DEVICE INTO LEFT SHOULDER JOINT, PERCUTANEOUS ENDOSCOPIC APPROACH: ICD-10-PCS | Performed by: ORTHOPAEDIC SURGERY

## 2021-04-02 DEVICE — FIRSTPASS ST SUTURE PASSER, SELF CAPTURE
Type: IMPLANTABLE DEVICE | Site: SHOULDER | Status: FUNCTIONAL
Brand: FIRSTPASS

## 2021-04-02 DEVICE — SPEEDSCREW 5.5 MM IMPLANT
Type: IMPLANTABLE DEVICE | Site: SHOULDER | Status: FUNCTIONAL
Brand: SPEEDSCREW

## 2021-04-02 RX ORDER — ONDANSETRON 2 MG/ML
4 INJECTION INTRAMUSCULAR; INTRAVENOUS AS NEEDED
Status: DISCONTINUED | OUTPATIENT
Start: 2021-04-02 | End: 2021-04-02

## 2021-04-02 RX ORDER — NALOXONE HYDROCHLORIDE 0.4 MG/ML
80 INJECTION, SOLUTION INTRAMUSCULAR; INTRAVENOUS; SUBCUTANEOUS AS NEEDED
Status: DISCONTINUED | OUTPATIENT
Start: 2021-04-02 | End: 2021-04-02

## 2021-04-02 RX ORDER — ROPIVACAINE HYDROCHLORIDE 5 MG/ML
INJECTION, SOLUTION EPIDURAL; INFILTRATION; PERINEURAL AS NEEDED
Status: DISCONTINUED | OUTPATIENT
Start: 2021-04-02 | End: 2021-04-02 | Stop reason: SURG

## 2021-04-02 RX ORDER — HYDROCODONE BITARTRATE AND ACETAMINOPHEN 5; 325 MG/1; MG/1
2 TABLET ORAL AS NEEDED
Status: DISCONTINUED | OUTPATIENT
Start: 2021-04-02 | End: 2021-04-02

## 2021-04-02 RX ORDER — OXYCODONE HYDROCHLORIDE AND ACETAMINOPHEN 5; 325 MG/1; MG/1
1-2 TABLET ORAL EVERY 6 HOURS PRN
Qty: 40 TABLET | Refills: 0 | Status: SHIPPED | OUTPATIENT
Start: 2021-04-02 | End: 2021-04-28

## 2021-04-02 RX ORDER — LIDOCAINE HYDROCHLORIDE 10 MG/ML
INJECTION, SOLUTION EPIDURAL; INFILTRATION; INTRACAUDAL; PERINEURAL AS NEEDED
Status: DISCONTINUED | OUTPATIENT
Start: 2021-04-02 | End: 2021-04-02 | Stop reason: SURG

## 2021-04-02 RX ORDER — HYDROCODONE BITARTRATE AND ACETAMINOPHEN 5; 325 MG/1; MG/1
1 TABLET ORAL AS NEEDED
Status: DISCONTINUED | OUTPATIENT
Start: 2021-04-02 | End: 2021-04-02

## 2021-04-02 RX ORDER — ACETAMINOPHEN 500 MG
1000 TABLET ORAL ONCE
COMMUNITY
End: 2021-11-11 | Stop reason: ALTCHOICE

## 2021-04-02 RX ORDER — ASPIRIN 81 MG/1
81 TABLET ORAL DAILY
Qty: 14 TABLET | Refills: 0 | Status: SHIPPED | OUTPATIENT
Start: 2021-04-02 | End: 2021-07-07

## 2021-04-02 RX ORDER — SODIUM CHLORIDE, SODIUM LACTATE, POTASSIUM CHLORIDE, CALCIUM CHLORIDE 600; 310; 30; 20 MG/100ML; MG/100ML; MG/100ML; MG/100ML
INJECTION, SOLUTION INTRAVENOUS CONTINUOUS
Status: DISCONTINUED | OUTPATIENT
Start: 2021-04-02 | End: 2021-04-02

## 2021-04-02 RX ORDER — DEXAMETHASONE SODIUM PHOSPHATE 4 MG/ML
VIAL (ML) INJECTION AS NEEDED
Status: DISCONTINUED | OUTPATIENT
Start: 2021-04-02 | End: 2021-04-02

## 2021-04-02 RX ORDER — ONDANSETRON 4 MG/1
4 TABLET, FILM COATED ORAL EVERY 8 HOURS PRN
Qty: 20 TABLET | Refills: 0 | Status: SHIPPED | OUTPATIENT
Start: 2021-04-02 | End: 2021-05-25 | Stop reason: ALTCHOICE

## 2021-04-02 RX ORDER — PHENYLEPHRINE HCL 10 MG/ML
VIAL (ML) INJECTION AS NEEDED
Status: DISCONTINUED | OUTPATIENT
Start: 2021-04-02 | End: 2021-04-02 | Stop reason: SURG

## 2021-04-02 RX ORDER — HYDROMORPHONE HYDROCHLORIDE 1 MG/ML
0.4 INJECTION, SOLUTION INTRAMUSCULAR; INTRAVENOUS; SUBCUTANEOUS EVERY 5 MIN PRN
Status: DISCONTINUED | OUTPATIENT
Start: 2021-04-02 | End: 2021-04-02

## 2021-04-02 RX ORDER — SENNA AND DOCUSATE SODIUM 50; 8.6 MG/1; MG/1
2 TABLET, FILM COATED ORAL DAILY
Qty: 20 TABLET | Refills: 1 | Status: SHIPPED | OUTPATIENT
Start: 2021-04-02 | End: 2021-11-11 | Stop reason: ALTCHOICE

## 2021-04-02 RX ORDER — VANCOMYCIN HYDROCHLORIDE 1 G/20ML
INJECTION, POWDER, LYOPHILIZED, FOR SOLUTION INTRAVENOUS AS NEEDED
Status: DISCONTINUED | OUTPATIENT
Start: 2021-04-02 | End: 2021-04-02 | Stop reason: SURG

## 2021-04-02 RX ORDER — ONDANSETRON 2 MG/ML
INJECTION INTRAMUSCULAR; INTRAVENOUS AS NEEDED
Status: DISCONTINUED | OUTPATIENT
Start: 2021-04-02 | End: 2021-04-02 | Stop reason: SURG

## 2021-04-02 RX ADMIN — VANCOMYCIN HYDROCHLORIDE 1000 MG: 1 INJECTION, POWDER, LYOPHILIZED, FOR SOLUTION INTRAVENOUS at 07:23:00

## 2021-04-02 RX ADMIN — ONDANSETRON 4 MG: 2 INJECTION INTRAMUSCULAR; INTRAVENOUS at 09:53:00

## 2021-04-02 RX ADMIN — ROPIVACAINE HYDROCHLORIDE 16 ML: 5 INJECTION, SOLUTION EPIDURAL; INFILTRATION; PERINEURAL at 07:11:00

## 2021-04-02 RX ADMIN — PHENYLEPHRINE HCL 40 MCG: 10 MG/ML VIAL (ML) INJECTION at 07:28:00

## 2021-04-02 RX ADMIN — LIDOCAINE HYDROCHLORIDE 30 MG: 10 INJECTION, SOLUTION EPIDURAL; INFILTRATION; INTRACAUDAL; PERINEURAL at 07:15:00

## 2021-04-02 NOTE — ANESTHESIA PREPROCEDURE EVALUATION
Patient Name: Isaac Hooker    Admit Diagnosis: Rotator cuff injury, left, initial encounter [O33.342T]    Pre-op Diagnosis: Rotator cuff injury, left, initial encounter [S46.002A]    LEFT SHOULDER ARTHROSCOPY AND DEBRIDEMENT, REMOVAL OF HARDWARE, RE Performed by Hieu José MD at [facility] MAIN OR   • UPPER GI ENDOSCOPY,EXAM       Social History    Tobacco Use      Smoking status: Former Smoker        Quit date:         Years since quittin.2      Smokeless tobacco: Never Used    Alcohol use: understands all risks and verbally agreed to proceed. All questions answered and written consent obtained.   The risks and benefits of regional anesthesia were also discussed including, but not limited to bleeding, infection, paresthesias, nerve damage, and

## 2021-04-02 NOTE — ANESTHESIA PROCEDURE NOTES
Airway  Urgency: elective      General Information and Staff    Patient location during procedure: OR  Anesthesiologist: Donny Muller MD  Performed: anesthesiologist     Indications and Patient Condition  Indications for airway management: anesthesia

## 2021-04-02 NOTE — INTERVAL H&P NOTE
Pre-op Diagnosis: Rotator cuff injury, left, initial encounter [S46.002A]    The above referenced H&P was reviewed by Alda Barbour MD on 4/2/2021, the patient was examined and no significant changes have occurred in the patient's condition since the

## 2021-04-02 NOTE — H&P
ORTHOPEDIC SURGERY CLINIC PROGRESS NOTE      Patient Name:Nader Patten  Date of Appointment: 3/17/2021     Chief Complaint: Patient presents with:   Follow - Up: F/U Left shoulder MRI results        HPI:    The patient is a 70year old year old male pain     Strength (out of 5): 5/5 all muscle groups  (ER, IR, SS, subscap)     Sensation: Intact to light touch in all nerve distributions     Special Tests:  All negative        Skin: Normal unless indicated above  Pulses: Symmetric palpable pulses distall pain, and failure to achieve the desired outcome.   The patient understands these risks and wishes to proceed.           Lakesha Matos MD  [facility]  Orthopedic Surgery, Sports Medicine

## 2021-04-02 NOTE — ANESTHESIA POSTPROCEDURE EVALUATION
[facility] Patient Status:  Hospital Outpatient Surgery   Age/Gender 70year old male MRN [id number]   [place] [facility] SURGERY Attending Sindy Cartagena MD   [facility] Day # 0 PCP Ted Lopez MD       Anesthesia Post-

## 2021-04-02 NOTE — ANESTHESIA PROCEDURE NOTES
Regional Block  Performed by: Brenden Clarke MD  Authorized by: Brenden Clarke MD       General Information and Staff    Start Time:   Anesthesiologist: Brenden Clarke MD  Performed by:   Anesthesiologist  Patient Location:  OR    Block Placement:

## 2021-04-02 NOTE — OPERATIVE REPORT
Operative Note  [address]    Patient Name: Talya Syed    Procedure Date: 2021    : 8/10/1945    MRN: [id number]    Preoperative Diagnosis: Complete rotator cuff tear of left shoulder S46.012A    Postoperative Diagnosis: Complet exhibited moderate degenerative tearing. The rotator cuff was inspected. The teres minor was intact. From the undersurface, the rotator cuff was noted to be torn from the anterior aspect of the supraspinatus all the way back to the mid infraspinatus.   Serena Negro more double incline mattress sutures were passed in the same fashion.   A percutaneous incision off the lateral edge of the acromion was made and a [employer] 5.5 mm Speed Screw anchor was tapped and placed at the posterior aspect of the footprint later

## 2021-05-25 PROBLEM — K21.9 GERD (GASTROESOPHAGEAL REFLUX DISEASE): Status: ACTIVE | Noted: 2021-05-25

## 2021-05-25 PROBLEM — G31.84 MILD COGNITIVE IMPAIRMENT WITH MEMORY LOSS: Status: ACTIVE | Noted: 2019-02-11

## 2021-05-25 PROBLEM — E11.9 DIABETES (HCC): Status: ACTIVE | Noted: 2021-05-25

## 2021-05-25 PROBLEM — T45.1X5A: Status: ACTIVE | Noted: 2021-05-25

## 2021-05-25 PROBLEM — I50.9 CHF (CONGESTIVE HEART FAILURE) (HCC): Status: ACTIVE | Noted: 2021-05-25

## 2021-05-25 PROBLEM — D61.811: Status: ACTIVE | Noted: 2021-05-25

## 2021-05-25 PROBLEM — H91.90 HEARING DECREASED: Status: ACTIVE | Noted: 2021-05-25

## 2021-07-08 PROBLEM — Z86.010 PERSONAL HISTORY OF COLONIC POLYPS: Status: ACTIVE | Noted: 2021-07-08

## 2021-07-12 ENCOUNTER — APPOINTMENT (OUTPATIENT)
Dept: CT IMAGING | Facility: HOSPITAL | Age: 76
End: 2021-07-12
Attending: EMERGENCY MEDICINE
Payer: MEDICARE

## 2021-07-12 ENCOUNTER — HOSPITAL ENCOUNTER (EMERGENCY)
Facility: HOSPITAL | Age: 76
Discharge: HOME | End: 2021-07-12
Attending: EMERGENCY MEDICINE
Payer: MEDICARE

## 2021-07-12 VITALS
HEIGHT: 71 IN | SYSTOLIC BLOOD PRESSURE: 139 MMHG | TEMPERATURE: 98 F | OXYGEN SATURATION: 98 % | DIASTOLIC BLOOD PRESSURE: 88 MMHG | RESPIRATION RATE: 18 BRPM | WEIGHT: 154 LBS | HEART RATE: 78 BPM | BODY MASS INDEX: 21.56 KG/M2

## 2021-07-12 DIAGNOSIS — R31.0 GROSS HEMATURIA: Primary | ICD-10-CM

## 2021-07-12 LAB
ANION GAP SERPL CALC-SCNC: 5 MMOL/L (ref 0–18)
BASOPHILS # BLD AUTO: 0.03 X10(3) UL (ref 0–0.2)
BASOPHILS NFR BLD AUTO: 1 %
BILIRUB UR QL STRIP.AUTO: NEGATIVE
BUN BLD-MCNC: 33 MG/DL (ref 7–18)
BUN/CREAT SERPL: 15.1 (ref 10–20)
CALCIUM BLD-MCNC: 8.7 MG/DL (ref 8.5–10.1)
CHLORIDE SERPL-SCNC: 108 MMOL/L (ref 98–112)
CLARITY UR REFRACT.AUTO: CLEAR
CO2 SERPL-SCNC: 25 MMOL/L (ref 21–32)
COLOR UR AUTO: YELLOW
CREAT BLD-MCNC: 2.19 MG/DL
DEPRECATED RDW RBC AUTO: 44.6 FL (ref 35.1–46.3)
EOSINOPHIL # BLD AUTO: 0.24 X10(3) UL (ref 0–0.7)
EOSINOPHIL NFR BLD AUTO: 7.9 %
ERYTHROCYTE [DISTWIDTH] IN BLOOD BY AUTOMATED COUNT: 15.8 % (ref 11–15)
GLUCOSE BLD-MCNC: 98 MG/DL (ref 70–99)
GLUCOSE UR STRIP.AUTO-MCNC: NEGATIVE MG/DL
HCT VFR BLD AUTO: 37.1 %
HGB BLD-MCNC: 13.1 G/DL
IMM GRANULOCYTES # BLD AUTO: 0.01 X10(3) UL (ref 0–1)
IMM GRANULOCYTES NFR BLD: 0.3 %
KETONES UR STRIP.AUTO-MCNC: NEGATIVE MG/DL
LEUKOCYTE ESTERASE UR QL STRIP.AUTO: NEGATIVE
LYMPHOCYTES # BLD AUTO: 1.12 X10(3) UL (ref 1–4)
LYMPHOCYTES NFR BLD AUTO: 37 %
MCH RBC QN AUTO: 27.8 PG (ref 26–34)
MCHC RBC AUTO-ENTMCNC: 35.3 G/DL (ref 31–37)
MCV RBC AUTO: 78.8 FL
MONOCYTES # BLD AUTO: 0.36 X10(3) UL (ref 0.1–1)
MONOCYTES NFR BLD AUTO: 11.9 %
NEUTROPHILS # BLD AUTO: 1.27 X10 (3) UL (ref 1.5–7.7)
NEUTROPHILS # BLD AUTO: 1.27 X10(3) UL (ref 1.5–7.7)
NEUTROPHILS NFR BLD AUTO: 41.9 %
NITRITE UR QL STRIP.AUTO: NEGATIVE
OSMOLALITY SERPL CALC.SUM OF ELEC: 293 MOSM/KG (ref 275–295)
PH UR STRIP.AUTO: 6 [PH] (ref 5–8)
PLATELET # BLD AUTO: 156 10(3)UL (ref 150–450)
POTASSIUM SERPL-SCNC: 4.2 MMOL/L (ref 3.5–5.1)
PROT UR STRIP.AUTO-MCNC: 100 MG/DL
RBC # BLD AUTO: 4.71 X10(6)UL
SODIUM SERPL-SCNC: 138 MMOL/L (ref 136–145)
SP GR UR STRIP.AUTO: 1.01 (ref 1–1.03)
UROBILINOGEN UR STRIP.AUTO-MCNC: <2 MG/DL
WBC # BLD AUTO: 3 X10(3) UL (ref 4–11)

## 2021-07-12 PROCEDURE — 99284 EMERGENCY DEPT VISIT MOD MDM: CPT

## 2021-07-12 PROCEDURE — 81001 URINALYSIS AUTO W/SCOPE: CPT | Performed by: EMERGENCY MEDICINE

## 2021-07-12 PROCEDURE — 80048 BASIC METABOLIC PNL TOTAL CA: CPT | Performed by: EMERGENCY MEDICINE

## 2021-07-12 PROCEDURE — 36415 COLL VENOUS BLD VENIPUNCTURE: CPT

## 2021-07-12 PROCEDURE — 85025 COMPLETE CBC W/AUTO DIFF WBC: CPT | Performed by: EMERGENCY MEDICINE

## 2021-07-12 PROCEDURE — 74176 CT ABD & PELVIS W/O CONTRAST: CPT | Performed by: EMERGENCY MEDICINE

## 2021-07-12 NOTE — ED PROVIDER NOTES
Patient Seen in: [facility] Emergency Department      History   Patient presents with:  Urinary Symptoms  Bleeding    Stated Complaint: hematuria    HPI/Subjective:   HPI    For the last 2 weeks, the patient has had hematuria in the morning.   Wife s SpO2 98 %   O2 Device None (Room air)       Current:/88   Pulse 78   Temp 98.3 °F (36.8 °C) (Temporal)   Resp 18   Ht 180.3 cm (5' 11\")   Wt 69.9 kg   SpO2 98%   BMI 21.48 kg/m²         Physical Exam      Constitutional: Awake, alert, age appearin -----------         ------                     CBC W/ DIFFERENTIAL[[id number]]          Abnormal            Final result                 Please view results for these tests on the individual orders. Basic blood work is fine.   U inflammatory process. Dictated by (CST): Danika Resendiz MD on 7/12/2021 at 1:13 PM     Finalized by (CST): Danika Resendiz MD on 7/12/2021 at 1:17 PM              MDM      Gross hematuria without evidence of infection or kidney stones.   Unclear et

## 2021-11-18 ENCOUNTER — ANESTHESIA EVENT (OUTPATIENT)
Dept: ENDOSCOPY | Facility: HOSPITAL | Age: 76
End: 2021-11-18
Payer: MEDICARE

## 2021-11-19 ENCOUNTER — LAB ENCOUNTER (OUTPATIENT)
Dept: LAB | Age: 76
End: 2021-11-19
Attending: INTERNAL MEDICINE
Payer: MEDICARE

## 2021-11-19 DIAGNOSIS — Z01.818 PRE-OP TESTING: ICD-10-CM

## 2021-11-22 ENCOUNTER — HOSPITAL ENCOUNTER (OUTPATIENT)
Facility: HOSPITAL | Age: 76
Setting detail: HOSPITAL OUTPATIENT SURGERY
Discharge: HOME | End: 2021-11-22
Attending: INTERNAL MEDICINE | Admitting: INTERNAL MEDICINE
Payer: MEDICARE

## 2021-11-22 ENCOUNTER — ANESTHESIA (OUTPATIENT)
Dept: ENDOSCOPY | Facility: HOSPITAL | Age: 76
End: 2021-11-22
Payer: MEDICARE

## 2021-11-22 VITALS
DIASTOLIC BLOOD PRESSURE: 78 MMHG | OXYGEN SATURATION: 100 % | RESPIRATION RATE: 16 BRPM | SYSTOLIC BLOOD PRESSURE: 150 MMHG | HEART RATE: 79 BPM | HEIGHT: 71 IN | TEMPERATURE: 98 F | BODY MASS INDEX: 23.1 KG/M2 | WEIGHT: 165 LBS

## 2021-11-22 DIAGNOSIS — R05.9 COUGH: ICD-10-CM

## 2021-11-22 DIAGNOSIS — Z01.818 PRE-OP TESTING: Primary | ICD-10-CM

## 2021-11-22 PROCEDURE — 0DB68ZX EXCISION OF STOMACH, VIA NATURAL OR ARTIFICIAL OPENING ENDOSCOPIC, DIAGNOSTIC: ICD-10-PCS | Performed by: INTERNAL MEDICINE

## 2021-11-22 PROCEDURE — 88305 TISSUE EXAM BY PATHOLOGIST: CPT | Performed by: INTERNAL MEDICINE

## 2021-11-22 RX ORDER — HYDROCODONE BITARTRATE AND ACETAMINOPHEN 10; 325 MG/1; MG/1
1 TABLET ORAL AS NEEDED
Status: CANCELLED | OUTPATIENT
Start: 2021-11-22

## 2021-11-22 RX ORDER — SODIUM CHLORIDE, SODIUM LACTATE, POTASSIUM CHLORIDE, CALCIUM CHLORIDE 600; 310; 30; 20 MG/100ML; MG/100ML; MG/100ML; MG/100ML
INJECTION, SOLUTION INTRAVENOUS CONTINUOUS
Status: DISCONTINUED | OUTPATIENT
Start: 2021-11-22 | End: 2021-11-22

## 2021-11-22 RX ORDER — ONDANSETRON 2 MG/ML
4 INJECTION INTRAMUSCULAR; INTRAVENOUS AS NEEDED
Status: DISCONTINUED | OUTPATIENT
Start: 2021-11-22 | End: 2021-11-22

## 2021-11-22 RX ORDER — HYDROMORPHONE HYDROCHLORIDE 1 MG/ML
0.4 INJECTION, SOLUTION INTRAMUSCULAR; INTRAVENOUS; SUBCUTANEOUS EVERY 5 MIN PRN
Status: CANCELLED | OUTPATIENT
Start: 2021-11-22 | End: 2021-11-22

## 2021-11-22 RX ORDER — METOCLOPRAMIDE HYDROCHLORIDE 5 MG/ML
10 INJECTION INTRAMUSCULAR; INTRAVENOUS AS NEEDED
Status: DISCONTINUED | OUTPATIENT
Start: 2021-11-22 | End: 2021-11-22

## 2021-11-22 RX ORDER — HYDROCODONE BITARTRATE AND ACETAMINOPHEN 10; 325 MG/1; MG/1
2 TABLET ORAL AS NEEDED
Status: CANCELLED | OUTPATIENT
Start: 2021-11-22

## 2021-11-22 RX ORDER — LIDOCAINE HYDROCHLORIDE 10 MG/ML
INJECTION, SOLUTION EPIDURAL; INFILTRATION; INTRACAUDAL; PERINEURAL AS NEEDED
Status: DISCONTINUED | OUTPATIENT
Start: 2021-11-22 | End: 2021-11-22 | Stop reason: SURG

## 2021-11-22 RX ORDER — NALOXONE HYDROCHLORIDE 0.4 MG/ML
80 INJECTION, SOLUTION INTRAMUSCULAR; INTRAVENOUS; SUBCUTANEOUS AS NEEDED
Status: DISCONTINUED | OUTPATIENT
Start: 2021-11-22 | End: 2021-11-22

## 2021-11-22 RX ADMIN — SODIUM CHLORIDE, SODIUM LACTATE, POTASSIUM CHLORIDE, CALCIUM CHLORIDE: 600; 310; 30; 20 INJECTION, SOLUTION INTRAVENOUS at 10:30:00

## 2021-11-22 RX ADMIN — SODIUM CHLORIDE, SODIUM LACTATE, POTASSIUM CHLORIDE, CALCIUM CHLORIDE: 600; 310; 30; 20 INJECTION, SOLUTION INTRAVENOUS at 10:23:00

## 2021-11-22 RX ADMIN — LIDOCAINE HYDROCHLORIDE 25 MG: 10 INJECTION, SOLUTION EPIDURAL; INFILTRATION; INTRACAUDAL; PERINEURAL at 10:22:00

## 2021-11-22 NOTE — PROGRESS NOTES
You will be happy to know that your COVID 19 test returned NEGATIVE. If you need any further assistance, please feel free to call [phone]. Thank you for letting us care for you.     Best regards,   Dagoberto Sharma MD  [address] [place] [address]

## 2021-11-22 NOTE — H&P
History and Physical for Endoscopic Procedure      Omaira Townsend Patient Status:  Hospital Outpatient Surgery    8/10/1945 MRN [id number]   Location [address] [place] [address] Attending Zahra Griffith MD   Hosp Day # 0 PCP Emmette Leventhal, Britt Iles OTHER (SEE COMMENTS)    Comment:Lost his taste and wt loss    Medications:    Current Facility-Administered Medications:   •  lactated ringers infusion, , Intravenous, Continuous    Review of Systems:  Gastrointestinal: negative other than specified in t

## 2021-11-22 NOTE — ANESTHESIA POSTPROCEDURE EVALUATION
[facility] Patient Status:  Hospital Outpatient Surgery   Age/Gender 78year old male MRN [id number]   Location [address] [place] [address] Attending Elvis Rodriguez MD   Hosp Day # 0 PCP Janine Singh MD       Anesthesia

## 2021-11-22 NOTE — OPERATIVE REPORT
[facility]                                                                                                        EGD Operative Report    Mary Baig Patient Status:  [facility] Normal.    Recommendations:   Await pathology    Discharge: The patient was given an after visit summary detailing the procedure, findings, recommendations and follow up plans.   Ozzie Graham MD  11/22/2021  9:45 AM

## 2021-11-22 NOTE — ANESTHESIA PREPROCEDURE EVALUATION
PRE-OP EVALUATION    Patient Name: Anna Marie Wang    Admit Diagnosis: Cough [R05.9]    Pre-op Diagnosis: Cough [R05.9]    ESOPHAGOGASTRODUODENOSCOPY (EGD)    Anesthesia Procedure: ESOPHAGOGASTRODUODENOSCOPY (EGD) (N/A )    Surgeon(s) and Role:     * W Tab, Take 300 mg by mouth daily. , Disp: , Rfl:   Dorzolamide HCl 2 % Ophthalmic Solution, Place 1 drop into both eyes 2 (two) times daily. , Disp: , Rfl:         Allergies: Dalteparin, Lisinopril, Penicillins, and Oxycodone      Anesthesia Evaluation    Myrna Bruno

## 2021-11-23 NOTE — PROGRESS NOTES
11/23/2021  Sarah [phone]    Dear Karina Sanchez,       Here are the biopsy/pathology findings from your recent EGD (Upper  Endoscopy): There is some inflammation seen in your stomach on the biopsies.   For [facility]

## 2021-12-08 ENCOUNTER — HOSPITAL ENCOUNTER (EMERGENCY)
Facility: HOSPITAL | Age: 76
Discharge: LEFT BEFORE DISCHARGE | End: 2021-12-08
Payer: MEDICARE

## 2022-11-03 ENCOUNTER — HOSPITAL ENCOUNTER (INPATIENT)
Facility: HOSPITAL | Age: 77
LOS: 4 days | Discharge: HOME | End: 2022-11-07
Attending: EMERGENCY MEDICINE | Admitting: INTERNAL MEDICINE
Payer: MEDICARE

## 2022-11-03 DIAGNOSIS — N17.9 AKI (ACUTE KIDNEY INJURY) (HCC): Primary | ICD-10-CM

## 2022-11-03 LAB
ALBUMIN SERPL-MCNC: 4.1 G/DL (ref 3.4–5)
ALBUMIN/GLOB SERPL: 1 {RATIO} (ref 1–2)
ALP LIVER SERPL-CCNC: 241 U/L
ALT SERPL-CCNC: 24 U/L
ANION GAP SERPL CALC-SCNC: 5 MMOL/L (ref 0–18)
AST SERPL-CCNC: 22 U/L (ref 15–37)
BASOPHILS # BLD AUTO: 0.04 X10(3) UL (ref 0–0.2)
BASOPHILS NFR BLD AUTO: 0.9 %
BILIRUB SERPL-MCNC: 0.6 MG/DL (ref 0.1–2)
BILIRUB UR QL STRIP.AUTO: NEGATIVE
BUN BLD-MCNC: 74 MG/DL (ref 7–18)
CALCIUM BLD-MCNC: 9 MG/DL (ref 8.5–10.1)
CHLORIDE SERPL-SCNC: 109 MMOL/L (ref 98–112)
CK SERPL-CCNC: 582 U/L
CLARITY UR REFRACT.AUTO: CLEAR
CO2 SERPL-SCNC: 23 MMOL/L (ref 21–32)
CREAT BLD-MCNC: 4.28 MG/DL
CREAT UR-SCNC: 50.4 MG/DL
EOSINOPHIL # BLD AUTO: 0.57 X10(3) UL (ref 0–0.7)
EOSINOPHIL NFR BLD AUTO: 12.5 %
ERYTHROCYTE [DISTWIDTH] IN BLOOD BY AUTOMATED COUNT: 15.5 %
FLUAV + FLUBV RNA SPEC NAA+PROBE: NEGATIVE
FLUAV + FLUBV RNA SPEC NAA+PROBE: NEGATIVE
GFR SERPLBLD BASED ON 1.73 SQ M-ARVRAT: 14 ML/MIN/1.73M2 (ref 60–?)
GLOBULIN PLAS-MCNC: 4.1 G/DL (ref 2.8–4.4)
GLUCOSE BLD-MCNC: 97 MG/DL (ref 70–99)
GLUCOSE UR STRIP.AUTO-MCNC: NEGATIVE MG/DL
HCT VFR BLD AUTO: 38.2 %
HGB BLD-MCNC: 13.3 G/DL
IMM GRANULOCYTES # BLD AUTO: 0.02 X10(3) UL (ref 0–1)
IMM GRANULOCYTES NFR BLD: 0.4 %
KETONES UR STRIP.AUTO-MCNC: NEGATIVE MG/DL
LEUKOCYTE ESTERASE UR QL STRIP.AUTO: NEGATIVE
LYMPHOCYTES # BLD AUTO: 1.46 X10(3) UL (ref 1–4)
LYMPHOCYTES NFR BLD AUTO: 32.1 %
MCH RBC QN AUTO: 28.6 PG (ref 26–34)
MCHC RBC AUTO-ENTMCNC: 34.8 G/DL (ref 31–37)
MCV RBC AUTO: 82.2 FL
MONOCYTES # BLD AUTO: 0.39 X10(3) UL (ref 0.1–1)
MONOCYTES NFR BLD AUTO: 8.6 %
NEUTROPHILS # BLD AUTO: 2.07 X10 (3) UL (ref 1.5–7.7)
NEUTROPHILS # BLD AUTO: 2.07 X10(3) UL (ref 1.5–7.7)
NEUTROPHILS NFR BLD AUTO: 45.5 %
NITRITE UR QL STRIP.AUTO: NEGATIVE
OSMOLALITY SERPL CALC.SUM OF ELEC: 306 MOSM/KG (ref 275–295)
PH UR STRIP.AUTO: 6 [PH] (ref 5–8)
PLATELET # BLD AUTO: 116 10(3)UL (ref 150–450)
POTASSIUM SERPL-SCNC: 4.2 MMOL/L (ref 3.5–5.1)
POTASSIUM UR-SCNC: 12.8 MMOL/L
PROT SERPL-MCNC: 8.2 G/DL (ref 6.4–8.2)
PROT UR STRIP.AUTO-MCNC: 30 MG/DL
RBC # BLD AUTO: 4.65 X10(6)UL
RSV RNA SPEC NAA+PROBE: NEGATIVE
SARS-COV-2 RNA RESP QL NAA+PROBE: NOT DETECTED
SODIUM SERPL-SCNC: 109 MMOL/L
SODIUM SERPL-SCNC: 137 MMOL/L (ref 136–145)
SP GR UR STRIP.AUTO: 1.01 (ref 1–1.03)
TROPONIN I HIGH SENSITIVITY: 18 NG/L
URATE UR-MCNC: 7.8 MG/DL
UROBILINOGEN UR STRIP.AUTO-MCNC: <2 MG/DL
WBC # BLD AUTO: 4.6 X10(3) UL (ref 4–11)

## 2022-11-03 RX ORDER — LATANOPROST 50 UG/ML
1 SOLUTION/ DROPS OPHTHALMIC NIGHTLY
Status: DISCONTINUED | OUTPATIENT
Start: 2022-11-03 | End: 2022-11-07

## 2022-11-03 RX ORDER — ALLOPURINOL 300 MG/1
300 TABLET ORAL DAILY
Status: DISCONTINUED | OUTPATIENT
Start: 2022-11-04 | End: 2022-11-04

## 2022-11-03 RX ORDER — PANTOPRAZOLE SODIUM 20 MG/1
20 TABLET, DELAYED RELEASE ORAL
Status: DISCONTINUED | OUTPATIENT
Start: 2022-11-04 | End: 2022-11-07

## 2022-11-03 RX ORDER — CHLORAL HYDRATE 500 MG
CAPSULE ORAL 2 TIMES DAILY
Status: DISCONTINUED | OUTPATIENT
Start: 2022-11-03 | End: 2022-11-03

## 2022-11-03 RX ORDER — DONEPEZIL HYDROCHLORIDE 10 MG/1
10 TABLET, FILM COATED ORAL NIGHTLY
Status: DISCONTINUED | OUTPATIENT
Start: 2022-11-04 | End: 2022-11-07

## 2022-11-03 RX ORDER — MELATONIN
3 NIGHTLY PRN
Status: DISCONTINUED | OUTPATIENT
Start: 2022-11-03 | End: 2022-11-07

## 2022-11-03 RX ORDER — TACROLIMUS 1 MG/1
1 CAPSULE ORAL 2 TIMES DAILY
Status: DISCONTINUED | OUTPATIENT
Start: 2022-11-03 | End: 2022-11-04

## 2022-11-03 RX ORDER — ACETAMINOPHEN 500 MG
500 TABLET ORAL EVERY 4 HOURS PRN
Status: DISCONTINUED | OUTPATIENT
Start: 2022-11-03 | End: 2022-11-07

## 2022-11-03 RX ORDER — MYCOPHENOLATE MOFETIL 250 MG/1
250 CAPSULE ORAL 2 TIMES DAILY
Status: DISCONTINUED | OUTPATIENT
Start: 2022-11-04 | End: 2022-11-07

## 2022-11-03 RX ORDER — MEMANTINE HYDROCHLORIDE 5 MG/1
5 TABLET ORAL DAILY
Status: DISCONTINUED | OUTPATIENT
Start: 2022-11-03 | End: 2022-11-07

## 2022-11-03 RX ORDER — CHOLECALCIFEROL (VITAMIN D3) 125 MCG
2000 CAPSULE ORAL DAILY
Status: DISCONTINUED | OUTPATIENT
Start: 2022-11-04 | End: 2022-11-07

## 2022-11-03 RX ORDER — SODIUM CHLORIDE 9 MG/ML
75 INJECTION, SOLUTION INTRAVENOUS CONTINUOUS
Status: DISCONTINUED | OUTPATIENT
Start: 2022-11-03 | End: 2022-11-06

## 2022-11-03 RX ORDER — SUCRALFATE 1 G/1
1 TABLET ORAL
Status: DISCONTINUED | OUTPATIENT
Start: 2022-11-03 | End: 2022-11-04

## 2022-11-03 RX ORDER — PRAVASTATIN SODIUM 20 MG
20 TABLET ORAL NIGHTLY
Status: DISCONTINUED | OUTPATIENT
Start: 2022-11-04 | End: 2022-11-07

## 2022-11-03 RX ORDER — HYDROXYZINE HYDROCHLORIDE 25 MG/1
25 TABLET, FILM COATED ORAL EVERY 8 HOURS PRN
Status: DISCONTINUED | OUTPATIENT
Start: 2022-11-03 | End: 2022-11-07

## 2022-11-03 RX ORDER — METHOCARBAMOL 500 MG/1
500 TABLET, FILM COATED ORAL 2 TIMES DAILY PRN
Status: DISCONTINUED | OUTPATIENT
Start: 2022-11-03 | End: 2022-11-04

## 2022-11-03 RX ORDER — ONDANSETRON 2 MG/ML
4 INJECTION INTRAMUSCULAR; INTRAVENOUS EVERY 6 HOURS PRN
Status: DISCONTINUED | OUTPATIENT
Start: 2022-11-03 | End: 2022-11-07

## 2022-11-03 RX ORDER — METOCLOPRAMIDE HYDROCHLORIDE 5 MG/ML
5 INJECTION INTRAMUSCULAR; INTRAVENOUS EVERY 8 HOURS PRN
Status: DISCONTINUED | OUTPATIENT
Start: 2022-11-03 | End: 2022-11-04

## 2022-11-03 RX ORDER — CARVEDILOL 3.12 MG/1
3.12 TABLET ORAL 2 TIMES DAILY WITH MEALS
Status: DISCONTINUED | OUTPATIENT
Start: 2022-11-04 | End: 2022-11-04

## 2022-11-03 NOTE — ED INITIAL ASSESSMENT (HPI)
Pt to the emergency room for body aches that has been on and off throughout the day but first started x2 days ago. Pt denies having fevers or cough. Tylenol last taken yesterday, and motrin last taken this morning, with no improvement in symptoms. Pt denies sob, weakness, dizziness.

## 2022-11-04 ENCOUNTER — APPOINTMENT (OUTPATIENT)
Dept: CV DIAGNOSTICS | Facility: HOSPITAL | Age: 77
End: 2022-11-04
Attending: INTERNAL MEDICINE
Payer: MEDICARE

## 2022-11-04 ENCOUNTER — APPOINTMENT (OUTPATIENT)
Dept: ULTRASOUND IMAGING | Facility: HOSPITAL | Age: 77
End: 2022-11-04
Attending: INTERNAL MEDICINE
Payer: MEDICARE

## 2022-11-04 LAB
ALBUMIN SERPL-MCNC: 3.5 G/DL (ref 3.4–5)
ALBUMIN/GLOB SERPL: 1.2 {RATIO} (ref 1–2)
ALP LIVER SERPL-CCNC: 176 U/L
ALT SERPL-CCNC: 18 U/L
ANION GAP SERPL CALC-SCNC: 7 MMOL/L (ref 0–18)
AST SERPL-CCNC: 16 U/L (ref 15–37)
ATRIAL RATE: 76 BPM
BASOPHILS # BLD AUTO: 0.02 X10(3) UL (ref 0–0.2)
BASOPHILS NFR BLD AUTO: 0.5 %
BILIRUB SERPL-MCNC: 0.8 MG/DL (ref 0.1–2)
BILIRUB UR QL STRIP.AUTO: NEGATIVE
BUN BLD-MCNC: 68 MG/DL (ref 7–18)
CALCIUM BLD-MCNC: 8.9 MG/DL (ref 8.5–10.1)
CALCIUM BLD-MCNC: 9.1 MG/DL (ref 8.5–10.1)
CHLORIDE SERPL-SCNC: 110 MMOL/L (ref 98–112)
CLARITY UR REFRACT.AUTO: CLEAR
CO2 SERPL-SCNC: 23 MMOL/L (ref 21–32)
CREAT BLD-MCNC: 3.52 MG/DL
CREAT BLD-MCNC: 3.92 MG/DL
EOSINOPHIL # BLD AUTO: 0.47 X10(3) UL (ref 0–0.7)
EOSINOPHIL NFR BLD AUTO: 12.8 %
ERYTHROCYTE [DISTWIDTH] IN BLOOD BY AUTOMATED COUNT: 15.2 %
GFR SERPLBLD BASED ON 1.73 SQ M-ARVRAT: 17 ML/MIN/1.73M2 (ref 60–?)
GLOBULIN PLAS-MCNC: 3 G/DL (ref 2.8–4.4)
GLUCOSE BLD-MCNC: 90 MG/DL (ref 70–99)
GLUCOSE UR STRIP.AUTO-MCNC: NEGATIVE MG/DL
HCT VFR BLD AUTO: 30.5 %
HGB BLD-MCNC: 11.1 G/DL
IMM GRANULOCYTES # BLD AUTO: 0.03 X10(3) UL (ref 0–1)
IMM GRANULOCYTES NFR BLD: 0.8 %
KETONES UR STRIP.AUTO-MCNC: NEGATIVE MG/DL
LEUKOCYTE ESTERASE UR QL STRIP.AUTO: NEGATIVE
LYMPHOCYTES # BLD AUTO: 1.21 X10(3) UL (ref 1–4)
LYMPHOCYTES NFR BLD AUTO: 33 %
MCH RBC QN AUTO: 29.4 PG (ref 26–34)
MCHC RBC AUTO-ENTMCNC: 36.4 G/DL (ref 31–37)
MCV RBC AUTO: 80.7 FL
MONOCYTES # BLD AUTO: 0.35 X10(3) UL (ref 0.1–1)
MONOCYTES NFR BLD AUTO: 9.5 %
NEUTROPHILS # BLD AUTO: 1.59 X10 (3) UL (ref 1.5–7.7)
NEUTROPHILS # BLD AUTO: 1.59 X10(3) UL (ref 1.5–7.7)
NEUTROPHILS NFR BLD AUTO: 43.4 %
NITRITE UR QL STRIP.AUTO: NEGATIVE
OSMOLALITY SERPL CALC.SUM OF ELEC: 309 MOSM/KG (ref 275–295)
P-R INTERVAL: 176 MS
PH UR STRIP.AUTO: 6 [PH] (ref 5–8)
PHOSPHATE SERPL-MCNC: 3.2 MG/DL (ref 2.5–4.9)
PHOSPHATE SERPL-MCNC: 3.4 MG/DL (ref 2.5–4.9)
PLATELET # BLD AUTO: 117 10(3)UL (ref 150–450)
POTASSIUM SERPL-SCNC: 4.6 MMOL/L (ref 3.5–5.1)
PROT SERPL-MCNC: 6.5 G/DL (ref 6.4–8.2)
PROT UR STRIP.AUTO-MCNC: NEGATIVE MG/DL
PTH-INTACT SERPL-MCNC: 184.2 PG/ML (ref 18.5–88)
Q-T INTERVAL: 468 MS
QRS DURATION: 136 MS
QTC CALCULATION (BEZET): 526 MS
R AXIS: 80 DEGREES
RBC # BLD AUTO: 3.78 X10(6)UL
SODIUM SERPL-SCNC: 140 MMOL/L (ref 136–145)
SP GR UR STRIP.AUTO: 1 (ref 1–1.03)
T AXIS: -72 DEGREES
UROBILINOGEN UR STRIP.AUTO-MCNC: <2 MG/DL
VENTRICULAR RATE: 76 BPM
WBC # BLD AUTO: 3.7 X10(3) UL (ref 4–11)

## 2022-11-04 PROCEDURE — 76770 US EXAM ABDO BACK WALL COMP: CPT | Performed by: INTERNAL MEDICINE

## 2022-11-04 PROCEDURE — 90792 PSYCH DIAG EVAL W/MED SRVCS: CPT | Performed by: OTHER

## 2022-11-04 PROCEDURE — 93306 TTE W/DOPPLER COMPLETE: CPT | Performed by: INTERNAL MEDICINE

## 2022-11-04 RX ORDER — FINASTERIDE 5 MG/1
5 TABLET, FILM COATED ORAL DAILY
Status: DISCONTINUED | OUTPATIENT
Start: 2022-11-04 | End: 2022-11-07

## 2022-11-04 RX ORDER — ALLOPURINOL 300 MG/1
150 TABLET ORAL DAILY
Status: DISCONTINUED | OUTPATIENT
Start: 2022-11-04 | End: 2022-11-07

## 2022-11-04 RX ORDER — FINASTERIDE 5 MG/1
5 TABLET, FILM COATED ORAL DAILY
COMMUNITY

## 2022-11-04 RX ORDER — RISPERIDONE 0.25 MG/1
0.25 TABLET, FILM COATED ORAL NIGHTLY
Status: DISCONTINUED | OUTPATIENT
Start: 2022-11-04 | End: 2022-11-07

## 2022-11-04 RX ORDER — CARVEDILOL 6.25 MG/1
6.25 TABLET ORAL 2 TIMES DAILY WITH MEALS
Status: DISCONTINUED | OUTPATIENT
Start: 2022-11-04 | End: 2022-11-04

## 2022-11-04 RX ORDER — CARVEDILOL 6.25 MG/1
6.25 TABLET ORAL 2 TIMES DAILY WITH MEALS
Status: DISCONTINUED | OUTPATIENT
Start: 2022-11-04 | End: 2022-11-07

## 2022-11-04 RX ORDER — TACROLIMUS 1 MG/1
2 CAPSULE ORAL DAILY
Status: DISCONTINUED | OUTPATIENT
Start: 2022-11-04 | End: 2022-11-07

## 2022-11-04 RX ORDER — BUMETANIDE 1 MG/1
1 TABLET ORAL 2 TIMES DAILY
COMMUNITY
End: 2022-11-07

## 2022-11-04 NOTE — PROGRESS NOTES
PSYCH CONSULT    Date of Admission: 11/3/22  Date of Consult: 11/4/22  Reason for Consultation: increased aggression at home    Impression:  Primary Psychiatric Diagnosis:   Dementia with behavioral disturbance. He has delusions of his wife leaving him that gets him angry x 1 year. He will her at times. On 11/2/22, he chocked her and then felt bad and starting crying and apologizing. No hx of hallucinations; what wife thought were hallucinations were misattribution delusions. Depressive disorder unspecified. Personality Traits:  Deferred     Pertinent Medical Diagnoses:  Hx heart transplant (2006). THADDEUS on CKD. Chronic cholecystitis. Recommendations:  1) Start Risperdal 0.25mg nightly to help with insomnia and irritability and delusional thinking. 2) Will ask Gris DE to see him this weekend and adjust Risperdal dose if needed. 3) He has an appointment with [place] [address] memory care in Dec 2022. Full note to follow.     Dr. Kimberly Aguayo

## 2022-11-04 NOTE — RESPIRATORY THERAPY NOTE
Patient seen for a Respiratory Care Evaluation for [facility]O. Patient states that he doesn't use a cpap. On [facility] protocol.

## 2022-11-04 NOTE — PROGRESS NOTES
[facility] consulted but transferred care to Dr. Kathy Post group as patient sees Dr. Madhu Luu as an outpatient. Discussed with Dr. Gilda Garcia this morning.     Blanquita Murray MD
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
## 2022-11-04 NOTE — CM/SW NOTE
CAMERON spoke with Vera Jaffe, psych liaison, regarding her assessment of pt. Given the nature of pt's behavior disturbances at home, he may require psych medications or placement. Psychiatrist vianey is pending. Will provide pt/spouse with post-acute lists for caregivers and assisted living facilities. PT eval also pending at this time. CM/MARCELLE to remain available for discharge planning.     JOSELITO CarrilloN, RN-BC    [id number]

## 2022-11-04 NOTE — PROGRESS NOTES
NURSING ADMISSION NOTE      Patient admitted via Cart  Oriented to room. Safety precautions initiated. Bed in low position. Call light in reach. Admission navigator completed with patient to extent of his ability. Did not wish to wake wife up. Patient unfamiliar with his medications. This RN called his pharmacy and reviewed his medication list with the pharmacist and made adjustments in PTA med list as needed. MD notified. Alert and oriented x 3-4, but forgetful. On room air with adequate O2 saturations. No complaints of shortness of breath. Continent of bowels and bladder. Was c/o cramping in legs and hands but has since resolved. No complaints of pain at this time. Needs met at this time. Bed locked and in lowest position, call light within reach. Plan: NS infusing at 125cc/hr. Renal to see, cards to see.

## 2022-11-04 NOTE — ED QUICK NOTES
Orders for admission, patient is aware of plan and ready to go upstairs. Any questions, please call ED RN Breezy Delacruz  at extension [phone]. Vaccinated? Yes  Type of COVID test sent: GeneX, Negative.   COVID Suspicion level: Low      Titratable drug(s) infusing: None  Rate:     0.9 @125ml/hr    LOC at time of transport: A&Ox3    Other pertinent information:    CIWA score=  NIH score=

## 2022-11-04 NOTE — PROGRESS NOTES
11/03/22 2326 11/03/22 2328 11/03/22 2330   Vital Signs   BP (!) 172/108 (!) 167/104 (!) 154/103   MAP (mmHg) 125 119 115   BP Location Left arm  --   --    BP Method Automatic  --   --    Patient Position Lying Sitting Standing     orthos

## 2022-11-05 LAB
ANION GAP SERPL CALC-SCNC: 8 MMOL/L (ref 0–18)
BASOPHILS # BLD AUTO: 0.01 X10(3) UL (ref 0–0.2)
BASOPHILS NFR BLD AUTO: 0.3 %
BUN BLD-MCNC: 60 MG/DL (ref 7–18)
CALCIUM BLD-MCNC: 8.7 MG/DL (ref 8.5–10.1)
CHLORIDE SERPL-SCNC: 113 MMOL/L (ref 98–112)
CO2 SERPL-SCNC: 21 MMOL/L (ref 21–32)
CREAT BLD-MCNC: 3.12 MG/DL
EOSINOPHIL # BLD AUTO: 0.36 X10(3) UL (ref 0–0.7)
EOSINOPHIL NFR BLD AUTO: 10.7 %
ERYTHROCYTE [DISTWIDTH] IN BLOOD BY AUTOMATED COUNT: 15.3 %
GFR SERPLBLD BASED ON 1.73 SQ M-ARVRAT: 20 ML/MIN/1.73M2 (ref 60–?)
GLUCOSE BLD-MCNC: 100 MG/DL (ref 70–99)
HCT VFR BLD AUTO: 30.2 %
HGB BLD-MCNC: 10.7 G/DL
IMM GRANULOCYTES # BLD AUTO: 0.01 X10(3) UL (ref 0–1)
IMM GRANULOCYTES NFR BLD: 0.3 %
LYMPHOCYTES # BLD AUTO: 1.17 X10(3) UL (ref 1–4)
LYMPHOCYTES NFR BLD AUTO: 34.7 %
MCH RBC QN AUTO: 28.7 PG (ref 26–34)
MCHC RBC AUTO-ENTMCNC: 35.4 G/DL (ref 31–37)
MCV RBC AUTO: 81 FL
MONOCYTES # BLD AUTO: 0.28 X10(3) UL (ref 0.1–1)
MONOCYTES NFR BLD AUTO: 8.3 %
NEUTROPHILS # BLD AUTO: 1.54 X10 (3) UL (ref 1.5–7.7)
NEUTROPHILS # BLD AUTO: 1.54 X10(3) UL (ref 1.5–7.7)
NEUTROPHILS NFR BLD AUTO: 45.7 %
OSMOLALITY SERPL CALC.SUM OF ELEC: 311 MOSM/KG (ref 275–295)
PLATELET # BLD AUTO: 110 10(3)UL (ref 150–450)
POTASSIUM SERPL-SCNC: 4.2 MMOL/L (ref 3.5–5.1)
RBC # BLD AUTO: 3.73 X10(6)UL
SODIUM SERPL-SCNC: 142 MMOL/L (ref 136–145)
WBC # BLD AUTO: 3.4 X10(3) UL (ref 4–11)

## 2022-11-05 PROCEDURE — 99231 SBSQ HOSP IP/OBS SF/LOW 25: CPT | Performed by: PHYSICIAN ASSISTANT

## 2022-11-05 NOTE — DISCHARGE INSTRUCTIONS
Psychiatry-  If you need to follow up with a psychiatrist please call the following for an appointment:    [facility]  [address]  [address] [facility] [address] [id number] [facility]   [address] [facility]   [address], [address] your appointment at the [facility] in December

## 2022-11-05 NOTE — PHYSICAL THERAPY NOTE
RN requesting PT Eval early AM 11/6/2022. Writer informed Sunday Charge PT for scheduling purposes. Per RN - Pt is one assist (does better without device), but noted impulsivity and balance difficulties.

## 2022-11-05 NOTE — PLAN OF CARE
Assumed care of pt at 0700. Patient alert and oriented x person and place  Calmer in the AM but increasingly restless in the afternoon. Verbalized he has things he needs to do regarding his money. Continuous tele monitoring in place. NSR on the monitor. Denies pain, dyspnea, dizziness and palpitations. Room air. Ambulated in halls with gait belt and walker. No falls this shift. Provided frequent reality orientation and reassurance. Safety and comfort maintained. Will continue to monitor. Problem: SAFETY ADULT - FALL  Goal: Free from fall injury  Description: INTERVENTIONS:  - Assess pt frequently for physical needs  - Identify cognitive and physical deficits and behaviors that affect risk of falls. - [place] fall precautions as indicated by assessment.  - Educate pt/family on patient safety including physical limitations  - Instruct pt to call for assistance with activity based on assessment  - Modify environment to reduce risk of injury  - Provide assistive devices as appropriate  - Consider OT/PT consult to assist with strengthening/mobility  - Encourage toileting schedule  Outcome: Progressing     Problem: CARDIOVASCULAR - ADULT  Goal: Maintains optimal cardiac output and hemodynamic stability  Description: INTERVENTIONS:  - Monitor vital signs, rhythm, and trends  - Monitor for bleeding, hypotension and signs of decreased cardiac output  - Evaluate effectiveness of vasoactive medications to optimize hemodynamic stability  - Monitor arterial and/or venous puncture sites for bleeding and/or hematoma  - Assess quality of pulses, skin color and temperature  - Assess for signs of decreased coronary artery perfusion - ex.  Angina  - Evaluate fluid balance, assess for edema, trend weights  Outcome: Progressing  Goal: Absence of cardiac arrhythmias or at baseline  Description: INTERVENTIONS:  - Continuous cardiac monitoring, monitor vital signs, obtain 12 lead EKG if indicated  - Evaluate effectiveness of antiarrhythmic and heart rate control medications as ordered  - Initiate emergency measures for life threatening arrhythmias  - Monitor electrolytes and administer replacement therapy as ordered  Outcome: Progressing

## 2022-11-05 NOTE — PLAN OF CARE
Assumed care of patient 1930 resting in bed. AO x3, impulsive and forgetful at times. NSR on tele monitor. O2 sat adequate on room air. Denies chest pain and shortness of breath. Plan of care updated. Bed locked, in lowest position, with bed alarm on. Call light and personal items in reach. All needs met. Problem: Patient/Family Goals  Goal: Patient/Family Long Term Goal  Description: Patient's Long Term Goal: stay out of the hospital    Interventions:  - take meds as prescribed  - attend follow up appointments  - See additional Care Plan goals for specific interventions  Outcome: Progressing  Goal: Patient/Family Short Term Goal  Description: Patient's Short Term Goal: go ho9me    Interventions:   - appropriate consults  - monitor on new meds  - IV fluids  - See additional Care Plan goals for specific interventions  Outcome: Progressing     Problem: SAFETY ADULT - FALL  Goal: Free from fall injury  Description: INTERVENTIONS:  - Assess pt frequently for physical needs  - Identify cognitive and physical deficits and behaviors that affect risk of falls.   - [place] fall precautions as indicated by assessment.  - Educate pt/family on patient safety including physical limitations  - Instruct pt to call for assistance with activity based on assessment  - Modify environment to reduce risk of injury  - Provide assistive devices as appropriate  - Consider OT/PT consult to assist with strengthening/mobility  - Encourage toileting schedule  Outcome: Progressing     Problem: CARDIOVASCULAR - ADULT  Goal: Maintains optimal cardiac output and hemodynamic stability  Description: INTERVENTIONS:  - Monitor vital signs, rhythm, and trends  - Monitor for bleeding, hypotension and signs of decreased cardiac output  - Evaluate effectiveness of vasoactive medications to optimize hemodynamic stability  - Monitor arterial and/or venous puncture sites for bleeding and/or hematoma  - Assess quality of pulses, skin color and temperature  - Assess for signs of decreased coronary artery perfusion - ex.  Angina  - Evaluate fluid balance, assess for edema, trend weights  Outcome: Progressing  Goal: Absence of cardiac arrhythmias or at baseline  Description: INTERVENTIONS:  - Continuous cardiac monitoring, monitor vital signs, obtain 12 lead EKG if indicated  - Evaluate effectiveness of antiarrhythmic and heart rate control medications as ordered  - Initiate emergency measures for life threatening arrhythmias  - Monitor electrolytes and administer replacement therapy as ordered  Outcome: Progressing

## 2022-11-06 LAB
ANION GAP SERPL CALC-SCNC: 6 MMOL/L (ref 0–18)
BUN BLD-MCNC: 54 MG/DL (ref 7–18)
CALCIUM BLD-MCNC: 8.5 MG/DL (ref 8.5–10.1)
CHLORIDE SERPL-SCNC: 118 MMOL/L (ref 98–112)
CO2 SERPL-SCNC: 20 MMOL/L (ref 21–32)
CREAT BLD-MCNC: 2.97 MG/DL
GFR SERPLBLD BASED ON 1.73 SQ M-ARVRAT: 21 ML/MIN/1.73M2 (ref 60–?)
GLUCOSE BLD-MCNC: 101 MG/DL (ref 70–99)
OSMOLALITY SERPL CALC.SUM OF ELEC: 313 MOSM/KG (ref 275–295)
POTASSIUM SERPL-SCNC: 4.5 MMOL/L (ref 3.5–5.1)
SODIUM SERPL-SCNC: 144 MMOL/L (ref 136–145)
TACROLIMUS: 4.2 NG/ML

## 2022-11-06 PROCEDURE — 99223 1ST HOSP IP/OBS HIGH 75: CPT | Performed by: INTERNAL MEDICINE

## 2022-11-06 PROCEDURE — 99231 SBSQ HOSP IP/OBS SF/LOW 25: CPT | Performed by: PHYSICIAN ASSISTANT

## 2022-11-06 RX ORDER — RISPERIDONE 0.25 MG/1
0.25 TABLET, FILM COATED ORAL AS NEEDED
Status: DISCONTINUED | OUTPATIENT
Start: 2022-11-06 | End: 2022-11-07

## 2022-11-06 NOTE — PLAN OF CARE
Assumed care for pt at 19:30 on 11/5    A&Jahsekvng, aware we're at a hospital. [facility] when asked for city, said year was 2000 something. Forgetful, restless at start of shift. Cooperative and pleasant after administrating risperidol and melatonin along with night time meds. Able to sleep. VSS on RA. NSR on tele. Voiding in urinal. Up with standby in room. NS @ 75/hr    Plan: AM BMP, renal following, PT to eval, psych to follow up- may need prn medication for progressive agitation through day. Bed alarm on, call light in reach, able to make needs known.

## 2022-11-07 VITALS
HEART RATE: 70 BPM | WEIGHT: 161.81 LBS | DIASTOLIC BLOOD PRESSURE: 91 MMHG | OXYGEN SATURATION: 97 % | RESPIRATION RATE: 18 BRPM | TEMPERATURE: 98 F | SYSTOLIC BLOOD PRESSURE: 134 MMHG | BODY MASS INDEX: 23 KG/M2

## 2022-11-07 LAB
ANION GAP SERPL CALC-SCNC: 7 MMOL/L (ref 0–18)
BUN BLD-MCNC: 52 MG/DL (ref 7–18)
CALCIUM BLD-MCNC: 8.5 MG/DL (ref 8.5–10.1)
CHLORIDE SERPL-SCNC: 116 MMOL/L (ref 98–112)
CO2 SERPL-SCNC: 19 MMOL/L (ref 21–32)
CREAT BLD-MCNC: 2.98 MG/DL
GFR SERPLBLD BASED ON 1.73 SQ M-ARVRAT: 21 ML/MIN/1.73M2 (ref 60–?)
GLUCOSE BLD-MCNC: 95 MG/DL (ref 70–99)
MAGNESIUM SERPL-MCNC: 2.2 MG/DL (ref 1.6–2.6)
OSMOLALITY SERPL CALC.SUM OF ELEC: 308 MOSM/KG (ref 275–295)
POTASSIUM SERPL-SCNC: 5 MMOL/L (ref 3.5–5.1)
SODIUM SERPL-SCNC: 142 MMOL/L (ref 136–145)

## 2022-11-07 PROCEDURE — 99233 SBSQ HOSP IP/OBS HIGH 50: CPT | Performed by: INTERNAL MEDICINE

## 2022-11-07 RX ORDER — CARVEDILOL 6.25 MG/1
6.25 TABLET ORAL 2 TIMES DAILY WITH MEALS
Refills: 0 | Status: SHIPPED | COMMUNITY
Start: 2022-11-07

## 2022-11-07 RX ORDER — BIMATOPROST 0.01 %
1 DROPS OPHTHALMIC (EYE) NIGHTLY
Qty: 4 EACH | Refills: 0 | Status: SHIPPED | OUTPATIENT
Start: 2022-11-07

## 2022-11-07 RX ORDER — RISPERIDONE 0.25 MG/1
TABLET, FILM COATED ORAL
Qty: 60 TABLET | Refills: 0 | Status: SHIPPED | OUTPATIENT
Start: 2022-11-07

## 2022-11-07 NOTE — PLAN OF CARE
Received patient at 0730. Alert and oriented x 3-4. Tele rhythm shows NSR w/ BBB. O2 saturation 95% on RA. Breath sounds clear bilaterally. Pt continent, up w/ standby assist. No C/O chest pain or SOB. Skin dry and intact. Bed is locked and in low position. Call light and personal items within reach. Reviewed plan of care and patient verbalizes understanding - poss discharge today. Problem: Patient/Family Goals  Goal: Patient/Family Long Term Goal  Description: Patient's Long Term Goal: stay out of the hospital    Interventions:  - take meds as prescribed  - attend follow up appointments  - See additional Care Plan goals for specific interventions  Outcome: Progressing  Goal: Patient/Family Short Term Goal  Description: Patient's Short Term Goal: go ho9me    Interventions:   - appropriate consults  - monitor on new meds  - IV fluids  - See additional Care Plan goals for specific interventions  Outcome: Progressing     Problem: SAFETY ADULT - FALL  Goal: Free from fall injury  Description: INTERVENTIONS:  - Assess pt frequently for physical needs  - Identify cognitive and physical deficits and behaviors that affect risk of falls.   - [place] fall precautions as indicated by assessment.  - Educate pt/family on patient safety including physical limitations  - Instruct pt to call for assistance with activity based on assessment  - Modify environment to reduce risk of injury  - Provide assistive devices as appropriate  - Consider OT/PT consult to assist with strengthening/mobility  - Encourage toileting schedule  Outcome: Progressing     Problem: CARDIOVASCULAR - ADULT  Goal: Maintains optimal cardiac output and hemodynamic stability  Description: INTERVENTIONS:  - Monitor vital signs, rhythm, and trends  - Monitor for bleeding, hypotension and signs of decreased cardiac output  - Evaluate effectiveness of vasoactive medications to optimize hemodynamic stability  - Monitor arterial and/or venous puncture sites for bleeding and/or hematoma  - Assess quality of pulses, skin color and temperature  - Assess for signs of decreased coronary artery perfusion - ex.  Angina  - Evaluate fluid balance, assess for edema, trend weights  Outcome: Progressing  Goal: Absence of cardiac arrhythmias or at baseline  Description: INTERVENTIONS:  - Continuous cardiac monitoring, monitor vital signs, obtain 12 lead EKG if indicated  - Evaluate effectiveness of antiarrhythmic and heart rate control medications as ordered  - Initiate emergency measures for life threatening arrhythmias  - Monitor electrolytes and administer replacement therapy as ordered  Outcome: Progressing

## 2022-11-07 NOTE — PLAN OF CARE
Assumed care for pt at 19:30 on 11/6    A&Ox3, slightly forgetful. Remembers this RN from previous night. Denies pain. VSS on RA. NSR BBB on tele. Continent of B&B. Up with standby. Plan: BMP & Mag AM draw, anticipated dc home    Bed alarm on, call light in reach, able to make needs known.

## 2022-11-07 NOTE — CM/SW NOTE
11/07/22 1100   CM/SW Referral Data   Referral Source Social Work (self-referral)   Reason for Referral Discharge planning   Informant Spouse/Significant Other;EMR   Patient Info   Patient's Current Mental Status at Time of Assessment Confused or unable to complete assessment   Patient's [address]   Patient lives with Spouse/Significant other     HOME SITUATION  Type of Home: House   Home Layout: One level  Lives With: Spouse  Drives: No  Patient Owned Equipment: None  Patient Regularly Uses: Glasses     Prior Level of [place] (Per PT evaluation): Pt lives with spouse in single story home. Pt reports being independent with ADL and mobility. Pt does not use RW or cane. Pt enjoys cooking and coloring. Patient is a 79 y/o male who admitted with THADDEUS (acute kidney injury) and Dementia. PT recommending HH and intermittent supervision, PT notes state patient was able to ambulate with supervision 300 feet with RW. Psych following and providing medication management, noted that patient has appointment with [facility] memory care in December 2022. Spoke with patient's wife Raven Jensen) regarding discharge planning. Patient lives with her in a ranch style house in [place]. He is normally independent with ambulation and ADLs. Discussed PT recommendation of HH and explained the benefits of HH RN, PT, Aaron Cabello declined. She confirmed she is always with patient and able to provide him with assistance if needed. SW will remain available.      Bipin Savage MARK  Discharge Planner  [phone]

## 2022-11-07 NOTE — PLAN OF CARE
Assumed care of pt at 0700. Patient alert and oriented x 3 but forgetful. Calm and cooperative. NSR on the monitor. Denies pain, dyspnea, dizziness and palpitations. Room air. [place] locked per order. Bed and chair alarms engaged. BM x2. Voiding per urinal.  Safety and comfort maintained. Will continue to monitor. Problem: SAFETY ADULT - FALL  Goal: Free from fall injury  Description: INTERVENTIONS:  - Assess pt frequently for physical needs  - Identify cognitive and physical deficits and behaviors that affect risk of falls. - [place] fall precautions as indicated by assessment.  - Educate pt/family on patient safety including physical limitations  - Instruct pt to call for assistance with activity based on assessment  - Modify environment to reduce risk of injury  - Provide assistive devices as appropriate  - Consider OT/PT consult to assist with strengthening/mobility  - Encourage toileting schedule  Outcome: Progressing     Problem: CARDIOVASCULAR - ADULT  Goal: Maintains optimal cardiac output and hemodynamic stability  Description: INTERVENTIONS:  - Monitor vital signs, rhythm, and trends  - Monitor for bleeding, hypotension and signs of decreased cardiac output  - Evaluate effectiveness of vasoactive medications to optimize hemodynamic stability  - Monitor arterial and/or venous puncture sites for bleeding and/or hematoma  - Assess quality of pulses, skin color and temperature  - Assess for signs of decreased coronary artery perfusion - ex.  Angina  - Evaluate fluid balance, assess for edema, trend weights  Outcome: Progressing  Goal: Absence of cardiac arrhythmias or at baseline  Description: INTERVENTIONS:  - Continuous cardiac monitoring, monitor vital signs, obtain 12 lead EKG if indicated  - Evaluate effectiveness of antiarrhythmic and heart rate control medications as ordered  - Initiate emergency measures for life threatening arrhythmias  - Monitor electrolytes and administer replacement therapy as ordered  Outcome: Progressing

## 2022-11-07 NOTE — OCCUPATIONAL THERAPY NOTE
OT orders received and pt chart reviewed. Pt is currently up ad heike and displaying no OT related concerns per RN and review of PT evaluation. OT will sign off at this time. Please re-order should pt experience change in functional status.

## 2022-11-07 NOTE — DISCHARGE PLANNING
Explained discharge instructions including medications and follow-up appointments to pt, given AVS, verbalized understanding. IV removed. Tele monitor discontinued. Will be transported via wheelchair. Problem: Patient/Family Goals  Goal: Patient/Family Long Term Goal  Description: Patient's Long Term Goal: stay out of the hospital    Interventions:  - take meds as prescribed  - attend follow up appointments  - See additional Care Plan goals for specific interventions  11/7/2022 1441 by Chen Christine RN  Outcome: Adequate for Discharge  11/7/2022 1027 by Chen Christine RN  Outcome: Progressing  Goal: Patient/Family Short Term Goal  Description: Patient's Short Term Goal: go ho9me    Interventions:   - appropriate consults  - monitor on new meds  - IV fluids  - See additional Care Plan goals for specific interventions  11/7/2022 1441 by Chen Christine RN  Outcome: Adequate for Discharge  11/7/2022 1027 by Chen Christine RN  Outcome: Progressing     Problem: SAFETY ADULT - FALL  Goal: Free from fall injury  Description: INTERVENTIONS:  - Assess pt frequently for physical needs  - Identify cognitive and physical deficits and behaviors that affect risk of falls.   - [place] fall precautions as indicated by assessment.  - Educate pt/family on patient safety including physical limitations  - Instruct pt to call for assistance with activity based on assessment  - Modify environment to reduce risk of injury  - Provide assistive devices as appropriate  - Consider OT/PT consult to assist with strengthening/mobility  - Encourage toileting schedule  11/7/2022 1441 by Chen Christine RN  Outcome: Adequate for Discharge  11/7/2022 1027 by Chen Christine RN  Outcome: Progressing     Problem: CARDIOVASCULAR - ADULT  Goal: Maintains optimal cardiac output and hemodynamic stability  Description: INTERVENTIONS:  - Monitor vital signs, rhythm, and trends  - Monitor for bleeding, hypotension and signs of decreased cardiac output  - Evaluate effectiveness of vasoactive medications to optimize hemodynamic stability  - Monitor arterial and/or venous puncture sites for bleeding and/or hematoma  - Assess quality of pulses, skin color and temperature  - Assess for signs of decreased coronary artery perfusion - ex.  Angina  - Evaluate fluid balance, assess for edema, trend weights  11/7/2022 1441 by Lowell Costa RN  Outcome: Adequate for Discharge  11/7/2022 1027 by Lowell Costa RN  Outcome: Progressing  Goal: Absence of cardiac arrhythmias or at baseline  Description: INTERVENTIONS:  - Continuous cardiac monitoring, monitor vital signs, obtain 12 lead EKG if indicated  - Evaluate effectiveness of antiarrhythmic and heart rate control medications as ordered  - Initiate emergency measures for life threatening arrhythmias  - Monitor electrolytes and administer replacement therapy as ordered  11/7/2022 1441 by Lowell Costa RN  Outcome: Adequate for Discharge  11/7/2022 1027 by Lowell Costa RN  Outcome: Progressing

## 2022-12-22 ENCOUNTER — HOSPITAL ENCOUNTER (INPATIENT)
Facility: HOSPITAL | Age: 77
LOS: 1 days | Discharge: HOME | End: 2022-12-23
Attending: STUDENT IN AN ORGANIZED HEALTH CARE EDUCATION/TRAINING PROGRAM | Admitting: Other
Payer: MEDICARE

## 2022-12-22 ENCOUNTER — APPOINTMENT (OUTPATIENT)
Dept: GENERAL RADIOLOGY | Facility: HOSPITAL | Age: 77
End: 2022-12-22
Attending: STUDENT IN AN ORGANIZED HEALTH CARE EDUCATION/TRAINING PROGRAM
Payer: MEDICARE

## 2022-12-22 DIAGNOSIS — E87.20 METABOLIC ACIDOSIS: ICD-10-CM

## 2022-12-22 DIAGNOSIS — R25.2 MUSCLE CRAMPING: ICD-10-CM

## 2022-12-22 DIAGNOSIS — N18.9 ACUTE RENAL FAILURE SUPERIMPOSED ON CHRONIC KIDNEY DISEASE, UNSPECIFIED CKD STAGE, UNSPECIFIED ACUTE RENAL FAILURE TYPE (HCC): Primary | ICD-10-CM

## 2022-12-22 DIAGNOSIS — N17.9 ACUTE RENAL FAILURE SUPERIMPOSED ON CHRONIC KIDNEY DISEASE, UNSPECIFIED CKD STAGE, UNSPECIFIED ACUTE RENAL FAILURE TYPE (HCC): Primary | ICD-10-CM

## 2022-12-22 DIAGNOSIS — R06.09 DYSPNEA ON EXERTION: ICD-10-CM

## 2022-12-22 LAB
ALBUMIN SERPL-MCNC: 3.6 G/DL (ref 3.4–5)
ALBUMIN/GLOB SERPL: 0.9 {RATIO} (ref 1–2)
ALP LIVER SERPL-CCNC: 234 U/L
ALT SERPL-CCNC: 17 U/L
ANION GAP SERPL CALC-SCNC: 6 MMOL/L (ref 0–18)
AST SERPL-CCNC: 15 U/L (ref 15–37)
ATRIAL RATE: 80 BPM
BASOPHILS # BLD AUTO: 0.02 X10(3) UL (ref 0–0.2)
BASOPHILS NFR BLD AUTO: 0.5 %
BILIRUB SERPL-MCNC: 0.6 MG/DL (ref 0.1–2)
BUN BLD-MCNC: 78 MG/DL (ref 7–18)
CALCIUM BLD-MCNC: 8.9 MG/DL (ref 8.5–10.1)
CHLORIDE SERPL-SCNC: 114 MMOL/L (ref 98–112)
CK SERPL-CCNC: 368 U/L
CO2 SERPL-SCNC: 19 MMOL/L (ref 21–32)
CREAT BLD-MCNC: 4.16 MG/DL
EOSINOPHIL # BLD AUTO: 0.62 X10(3) UL (ref 0–0.7)
EOSINOPHIL NFR BLD AUTO: 14.3 %
ERYTHROCYTE [DISTWIDTH] IN BLOOD BY AUTOMATED COUNT: 16 %
GFR SERPLBLD BASED ON 1.73 SQ M-ARVRAT: 14 ML/MIN/1.73M2 (ref 60–?)
GLOBULIN PLAS-MCNC: 3.8 G/DL (ref 2.8–4.4)
GLUCOSE BLD-MCNC: 100 MG/DL (ref 70–99)
HCT VFR BLD AUTO: 32.3 %
HGB BLD-MCNC: 11.3 G/DL
IMM GRANULOCYTES # BLD AUTO: 0.01 X10(3) UL (ref 0–1)
IMM GRANULOCYTES NFR BLD: 0.2 %
LYMPHOCYTES # BLD AUTO: 1.36 X10(3) UL (ref 1–4)
LYMPHOCYTES NFR BLD AUTO: 31.3 %
MAGNESIUM SERPL-MCNC: 2 MG/DL (ref 1.6–2.6)
MCH RBC QN AUTO: 28.3 PG (ref 26–34)
MCHC RBC AUTO-ENTMCNC: 35 G/DL (ref 31–37)
MCV RBC AUTO: 81 FL
MONOCYTES # BLD AUTO: 0.46 X10(3) UL (ref 0.1–1)
MONOCYTES NFR BLD AUTO: 10.6 %
NEUTROPHILS # BLD AUTO: 1.87 X10 (3) UL (ref 1.5–7.7)
NEUTROPHILS # BLD AUTO: 1.87 X10(3) UL (ref 1.5–7.7)
NEUTROPHILS NFR BLD AUTO: 43.1 %
NT-PROBNP SERPL-MCNC: 4534 PG/ML (ref ?–450)
OSMOLALITY SERPL CALC.SUM OF ELEC: 311 MOSM/KG (ref 275–295)
P AXIS: 40 DEGREES
P-R INTERVAL: 170 MS
PLATELET # BLD AUTO: 128 10(3)UL (ref 150–450)
POTASSIUM SERPL-SCNC: 4.7 MMOL/L (ref 3.5–5.1)
PROT SERPL-MCNC: 7.4 G/DL (ref 6.4–8.2)
Q-T INTERVAL: 454 MS
QRS DURATION: 138 MS
QTC CALCULATION (BEZET): 523 MS
R AXIS: 75 DEGREES
RBC # BLD AUTO: 3.99 X10(6)UL
SARS-COV-2 RNA RESP QL NAA+PROBE: NOT DETECTED
SODIUM SERPL-SCNC: 139 MMOL/L (ref 136–145)
T AXIS: -73 DEGREES
TACROLIMUS STAT: 4.1 MCG/L
TROPONIN I HIGH SENSITIVITY: 14 NG/L
VENTRICULAR RATE: 80 BPM
WBC # BLD AUTO: 4.3 X10(3) UL (ref 4–11)

## 2022-12-22 PROCEDURE — 71046 X-RAY EXAM CHEST 2 VIEWS: CPT | Performed by: STUDENT IN AN ORGANIZED HEALTH CARE EDUCATION/TRAINING PROGRAM

## 2022-12-22 RX ORDER — CARVEDILOL 12.5 MG/1
12.5 TABLET ORAL 2 TIMES DAILY WITH MEALS
Status: DISCONTINUED | OUTPATIENT
Start: 2022-12-22 | End: 2022-12-23

## 2022-12-22 RX ORDER — HEPARIN SODIUM 5000 [USP'U]/ML
5000 INJECTION, SOLUTION INTRAVENOUS; SUBCUTANEOUS EVERY 8 HOURS SCHEDULED
Status: DISCONTINUED | OUTPATIENT
Start: 2022-12-22 | End: 2022-12-23

## 2022-12-22 RX ORDER — PANTOPRAZOLE SODIUM 20 MG/1
20 TABLET, DELAYED RELEASE ORAL
Status: DISCONTINUED | OUTPATIENT
Start: 2022-12-23 | End: 2022-12-23

## 2022-12-22 RX ORDER — DORZOLAMIDE HYDROCHLORIDE AND TIMOLOL MALEATE 20; 5 MG/ML; MG/ML
1 SOLUTION/ DROPS OPHTHALMIC 2 TIMES DAILY
COMMUNITY

## 2022-12-22 RX ORDER — POLYETHYLENE GLYCOL 3350 17 G/17G
17 POWDER, FOR SOLUTION ORAL DAILY PRN
Status: DISCONTINUED | OUTPATIENT
Start: 2022-12-22 | End: 2022-12-23

## 2022-12-22 RX ORDER — ACETAMINOPHEN 500 MG
500 TABLET ORAL EVERY 4 HOURS PRN
Status: DISCONTINUED | OUTPATIENT
Start: 2022-12-22 | End: 2022-12-23

## 2022-12-22 RX ORDER — MELATONIN
3 NIGHTLY PRN
Status: DISCONTINUED | OUTPATIENT
Start: 2022-12-22 | End: 2022-12-23

## 2022-12-22 RX ORDER — BISACODYL 10 MG
10 SUPPOSITORY, RECTAL RECTAL
Status: DISCONTINUED | OUTPATIENT
Start: 2022-12-22 | End: 2022-12-23

## 2022-12-22 RX ORDER — CARVEDILOL 12.5 MG/1
12.5 TABLET ORAL 2 TIMES DAILY WITH MEALS
Status: DISCONTINUED | OUTPATIENT
Start: 2022-12-22 | End: 2022-12-22

## 2022-12-22 RX ORDER — MYCOPHENOLATE MOFETIL 250 MG/1
250 CAPSULE ORAL 2 TIMES DAILY
Status: DISCONTINUED | OUTPATIENT
Start: 2022-12-22 | End: 2022-12-23

## 2022-12-22 RX ORDER — FINASTERIDE 5 MG/1
5 TABLET, FILM COATED ORAL DAILY
Status: DISCONTINUED | OUTPATIENT
Start: 2022-12-23 | End: 2022-12-23

## 2022-12-22 RX ORDER — ALLOPURINOL 300 MG/1
300 TABLET ORAL DAILY
Status: DISCONTINUED | OUTPATIENT
Start: 2022-12-23 | End: 2022-12-23

## 2022-12-22 RX ORDER — TACROLIMUS 0.5 MG/1
1.5 CAPSULE ORAL 2 TIMES DAILY
Status: ON HOLD | COMMUNITY
End: 2022-12-23

## 2022-12-22 RX ORDER — DONEPEZIL HYDROCHLORIDE 5 MG/1
10 TABLET, FILM COATED ORAL NIGHTLY
Status: DISCONTINUED | OUTPATIENT
Start: 2022-12-22 | End: 2022-12-23

## 2022-12-22 RX ORDER — LATANOPROST 50 UG/ML
1 SOLUTION/ DROPS OPHTHALMIC NIGHTLY
Status: DISCONTINUED | OUTPATIENT
Start: 2022-12-22 | End: 2022-12-22

## 2022-12-22 RX ORDER — MEMANTINE HYDROCHLORIDE 5 MG/1
5 TABLET ORAL DAILY
Status: DISCONTINUED | OUTPATIENT
Start: 2022-12-22 | End: 2022-12-23

## 2022-12-22 RX ORDER — SODIUM CHLORIDE 9 MG/ML
125 INJECTION, SOLUTION INTRAVENOUS CONTINUOUS
Status: ACTIVE | OUTPATIENT
Start: 2022-12-22 | End: 2022-12-22

## 2022-12-22 RX ORDER — RISPERIDONE 0.25 MG/1
0.25 TABLET ORAL NIGHTLY
Status: DISCONTINUED | OUTPATIENT
Start: 2022-12-22 | End: 2022-12-23

## 2022-12-22 RX ORDER — TACROLIMUS 1 MG/1
2 CAPSULE ORAL EVERY EVENING
Status: DISCONTINUED | OUTPATIENT
Start: 2022-12-22 | End: 2022-12-22 | Stop reason: DRUGHIGH

## 2022-12-22 RX ORDER — TACROLIMUS 1 MG/1
3 CAPSULE ORAL EVERY MORNING
Status: DISCONTINUED | OUTPATIENT
Start: 2022-12-23 | End: 2022-12-22 | Stop reason: DRUGHIGH

## 2022-12-22 RX ORDER — ONDANSETRON 2 MG/ML
4 INJECTION INTRAMUSCULAR; INTRAVENOUS EVERY 6 HOURS PRN
Status: DISCONTINUED | OUTPATIENT
Start: 2022-12-22 | End: 2022-12-23

## 2022-12-22 RX ORDER — SENNOSIDES 8.6 MG
17.2 TABLET ORAL NIGHTLY PRN
Status: DISCONTINUED | OUTPATIENT
Start: 2022-12-22 | End: 2022-12-23

## 2022-12-22 RX ORDER — METOCLOPRAMIDE HYDROCHLORIDE 5 MG/ML
5 INJECTION INTRAMUSCULAR; INTRAVENOUS EVERY 8 HOURS PRN
Status: DISCONTINUED | OUTPATIENT
Start: 2022-12-22 | End: 2022-12-23

## 2022-12-22 RX ORDER — BUMETANIDE 1 MG/1
1 TABLET ORAL DAILY
COMMUNITY

## 2022-12-22 NOTE — ED INITIAL ASSESSMENT (HPI)
A&Ox3 ambulatory patient p/w generalized cramping    Patient endorses cramping in legs, hands and neck for the past few months but worsening today    Also endorses +COYLE and BLE swelling x2 weeks    RR even/NL, speaking in full clear sentences

## 2022-12-23 VITALS
SYSTOLIC BLOOD PRESSURE: 142 MMHG | RESPIRATION RATE: 18 BRPM | BODY MASS INDEX: 23 KG/M2 | DIASTOLIC BLOOD PRESSURE: 99 MMHG | WEIGHT: 162.81 LBS | HEART RATE: 68 BPM | OXYGEN SATURATION: 99 % | TEMPERATURE: 98 F

## 2022-12-23 LAB
ALBUMIN SERPL-MCNC: 3 G/DL (ref 3.4–5)
ALBUMIN/GLOB SERPL: 1 {RATIO} (ref 1–2)
ALP LIVER SERPL-CCNC: 176 U/L
ALT SERPL-CCNC: 13 U/L
ANION GAP SERPL CALC-SCNC: 8 MMOL/L (ref 0–18)
AST SERPL-CCNC: 15 U/L (ref 15–37)
BASOPHILS # BLD AUTO: 0.02 X10(3) UL (ref 0–0.2)
BASOPHILS NFR BLD AUTO: 0.5 %
BILIRUB SERPL-MCNC: 0.8 MG/DL (ref 0.1–2)
BUN BLD-MCNC: 73 MG/DL (ref 7–18)
CALCIUM BLD-MCNC: 8.8 MG/DL (ref 8.5–10.1)
CHLORIDE SERPL-SCNC: 116 MMOL/L (ref 98–112)
CO2 SERPL-SCNC: 19 MMOL/L (ref 21–32)
CREAT BLD-MCNC: 3.55 MG/DL
EOSINOPHIL # BLD AUTO: 0.52 X10(3) UL (ref 0–0.7)
EOSINOPHIL NFR BLD AUTO: 13.7 %
ERYTHROCYTE [DISTWIDTH] IN BLOOD BY AUTOMATED COUNT: 16 %
GFR SERPLBLD BASED ON 1.73 SQ M-ARVRAT: 17 ML/MIN/1.73M2 (ref 60–?)
GLOBULIN PLAS-MCNC: 3 G/DL (ref 2.8–4.4)
GLUCOSE BLD-MCNC: 97 MG/DL (ref 70–99)
HCT VFR BLD AUTO: 28.5 %
HGB BLD-MCNC: 10 G/DL
IMM GRANULOCYTES # BLD AUTO: 0.01 X10(3) UL (ref 0–1)
IMM GRANULOCYTES NFR BLD: 0.3 %
LYMPHOCYTES # BLD AUTO: 1.25 X10(3) UL (ref 1–4)
LYMPHOCYTES NFR BLD AUTO: 32.9 %
MAGNESIUM SERPL-MCNC: 1.9 MG/DL (ref 1.6–2.6)
MCH RBC QN AUTO: 28.3 PG (ref 26–34)
MCHC RBC AUTO-ENTMCNC: 35.1 G/DL (ref 31–37)
MCV RBC AUTO: 80.7 FL
MONOCYTES # BLD AUTO: 0.36 X10(3) UL (ref 0.1–1)
MONOCYTES NFR BLD AUTO: 9.5 %
NEUTROPHILS # BLD AUTO: 1.64 X10 (3) UL (ref 1.5–7.7)
NEUTROPHILS # BLD AUTO: 1.64 X10(3) UL (ref 1.5–7.7)
NEUTROPHILS NFR BLD AUTO: 43.1 %
OSMOLALITY SERPL CALC.SUM OF ELEC: 317 MOSM/KG (ref 275–295)
PHOSPHATE SERPL-MCNC: 4.1 MG/DL (ref 2.5–4.9)
PLATELET # BLD AUTO: 115 10(3)UL (ref 150–450)
POTASSIUM SERPL-SCNC: 4.4 MMOL/L (ref 3.5–5.1)
PROT SERPL-MCNC: 6 G/DL (ref 6.4–8.2)
RBC # BLD AUTO: 3.53 X10(6)UL
SODIUM SERPL-SCNC: 143 MMOL/L (ref 136–145)
TACROLIMUS STAT: 3.8 MCG/L
WBC # BLD AUTO: 3.8 X10(3) UL (ref 4–11)

## 2022-12-23 PROCEDURE — 99222 1ST HOSP IP/OBS MODERATE 55: CPT | Performed by: INTERNAL MEDICINE

## 2022-12-23 RX ORDER — HYDRALAZINE HYDROCHLORIDE 10 MG/1
10 TABLET, FILM COATED ORAL 2 TIMES DAILY
Qty: 60 TABLET | Refills: 3 | Status: SHIPPED | OUTPATIENT
Start: 2022-12-23 | End: 2023-03-23

## 2022-12-23 RX ORDER — CARVEDILOL 12.5 MG/1
12.5 TABLET ORAL 2 TIMES DAILY WITH MEALS
Qty: 360 TABLET | Refills: 3 | Status: SHIPPED | OUTPATIENT
Start: 2022-12-23 | End: 2023-03-23

## 2022-12-23 RX ORDER — HYDRALAZINE HYDROCHLORIDE 25 MG/1
25 TABLET, FILM COATED ORAL 2 TIMES DAILY
Status: DISCONTINUED | OUTPATIENT
Start: 2022-12-23 | End: 2022-12-23

## 2022-12-23 RX ORDER — TACROLIMUS 1 MG/1
2 CAPSULE ORAL EVERY EVENING
Qty: 60 CAPSULE | Refills: 0 | Status: SHIPPED | OUTPATIENT
Start: 2022-12-23

## 2022-12-23 RX ORDER — TACROLIMUS 0.5 MG/1
1.5 CAPSULE ORAL EVERY MORNING
Refills: 0 | Status: SHIPPED | COMMUNITY
Start: 2022-12-23

## 2022-12-23 RX ORDER — TACROLIMUS 1 MG/1
2 CAPSULE ORAL NIGHTLY
Status: DISCONTINUED | OUTPATIENT
Start: 2022-12-23 | End: 2022-12-23

## 2022-12-23 RX ORDER — CARVEDILOL 12.5 MG/1
12.5 TABLET ORAL 2 TIMES DAILY WITH MEALS
Qty: 60 TABLET | Refills: 0 | Status: SHIPPED | OUTPATIENT
Start: 2022-12-23

## 2022-12-23 RX ORDER — HYDRALAZINE HYDROCHLORIDE 25 MG/1
25 TABLET, FILM COATED ORAL 2 TIMES DAILY
Qty: 60 TABLET | Refills: 0 | Status: SHIPPED | OUTPATIENT
Start: 2022-12-23 | End: 2022-12-23

## 2022-12-23 RX ORDER — HYDRALAZINE HYDROCHLORIDE 10 MG/1
10 TABLET, FILM COATED ORAL 2 TIMES DAILY
Status: DISCONTINUED | OUTPATIENT
Start: 2022-12-23 | End: 2022-12-23

## 2022-12-23 NOTE — PLAN OF CARE
Pt and VS remained stable throughout shift. Denies CP/SOB sats maintained in 90's on RA. Up ambulating ananth well. Worked with PT/OT POC updated with pt and spouse at bedside. Ok to discharge per cards MD rounding. AVS and Rx printed off floor via w/c to [facility] by unit staff. Stable at discharge.

## 2022-12-23 NOTE — PLAN OF CARE
Pt A&Ox 2-3; poor historian, contacted wife for further information regarding patient. On RA; SpO2 remaining greater than 92% with no current complaints of SOB. NSR on tele; no c/o cardiac symptoms. Pt denies chest pain or discomfort; seems to be resting comfortably at this time. Pt up with SBA, tolerating well. POC neph and cards constult. PT/OT eval. Hold diuretics and statins. Monitor blood pressures. Plan of care reviewed with pt and patients spouse; all questions answered at this time. Bed in lowest position; call light within reach. High fall risk precautions are in place per unit protocol. Problem: SAFETY ADULT - FALL  Goal: Free from fall injury  Description: INTERVENTIONS:  - Assess pt frequently for physical needs  - Identify cognitive and physical deficits and behaviors that affect risk of falls. - Dustin fall precautions as indicated by assessment.  - Educate pt/family on patient safety including physical limitations  - Instruct pt to call for assistance with activity based on assessment  - Modify environment to reduce risk of injury  - Provide assistive devices as appropriate  - Consider OT/PT consult to assist with strengthening/mobility  - Encourage toileting schedule  Outcome: Progressing     Problem: CARDIOVASCULAR - ADULT  Goal: Maintains optimal cardiac output and hemodynamic stability  Description: INTERVENTIONS:  - Monitor vital signs, rhythm, and trends  - Monitor for bleeding, hypotension and signs of decreased cardiac output  - Evaluate effectiveness of vasoactive medications to optimize hemodynamic stability  - Monitor arterial and/or venous puncture sites for bleeding and/or hematoma  - Assess quality of pulses, skin color and temperature  - Assess for signs of decreased coronary artery perfusion - ex.  Angina  - Evaluate fluid balance, assess for edema, trend weights  Outcome: Progressing  Goal: Absence of cardiac arrhythmias or at baseline  Description: INTERVENTIONS:  - Continuous cardiac monitoring, monitor vital signs, obtain 12 lead EKG if indicated  - Evaluate effectiveness of antiarrhythmic and heart rate control medications as ordered  - Initiate emergency measures for life threatening arrhythmias  - Monitor electrolytes and administer replacement therapy as ordered  Outcome: Progressing     Problem: RESPIRATORY - ADULT  Goal: Achieves optimal ventilation and oxygenation  Description: INTERVENTIONS:  - Assess for changes in respiratory status  - Assess for changes in mentation and behavior  - Position to facilitate oxygenation and minimize respiratory effort  - Oxygen supplementation based on oxygen saturation or ABGs  - Provide Smoking Cessation handout, if applicable  - Encourage broncho-pulmonary hygiene including cough, deep breathe, Incentive Spirometry  - Assess the need for suctioning and perform as needed  - Assess and instruct to report SOB or any respiratory difficulty  - Respiratory Therapy support as indicated  - Manage/alleviate anxiety  - Monitor for signs/symptoms of CO2 retention  Outcome: Progressing     Problem: METABOLIC/FLUID AND ELECTROLYTES - ADULT  Goal: Electrolytes maintained within normal limits  Description: INTERVENTIONS:  - Monitor labs and rhythm and assess patient for signs and symptoms of electrolyte imbalances  - Administer electrolyte replacement as ordered  - Monitor response to electrolyte replacements, including rhythm and repeat lab results as appropriate  - Fluid restriction as ordered  - Instruct patient on fluid and nutrition restrictions as appropriate  Outcome: Progressing     Problem: SKIN/TISSUE INTEGRITY - ADULT  Goal: Skin integrity remains intact  Description: INTERVENTIONS  - Assess and document risk factors for pressure ulcer development  - Assess and document skin integrity  - Monitor for areas of redness and/or skin breakdown  - Initiate interventions, skin care algorithm/standards of care as needed  Outcome: Progressing Problem: PAIN - ADULT  Goal: Verbalizes/displays adequate comfort level or patient's stated pain goal  Description: INTERVENTIONS:  - Encourage pt to monitor pain and request assistance  - Assess pain using appropriate pain scale  - Administer analgesics based on type and severity of pain and evaluate response  - Implement non-pharmacological measures as appropriate and evaluate response  - Consider cultural and social influences on pain and pain management  - Manage/alleviate anxiety  - Utilize distraction and/or relaxation techniques  - Monitor for opioid side effects  - Notify MD/LIP if interventions unsuccessful or patient reports new pain  - Anticipate increased pain with activity and pre-medicate as appropriate  Outcome: Progressing     Problem: RISK FOR INFECTION - ADULT  Goal: Absence of fever/infection during anticipated neutropenic period  Description: INTERVENTIONS  - Monitor WBC  - Administer growth factors as ordered  - Implement neutropenic guidelines  Outcome: Progressing     Problem: DISCHARGE PLANNING  Goal: Discharge to home or other facility with appropriate resources  Description: INTERVENTIONS:  - Identify barriers to discharge w/pt and caregiver  - Include patient/family/discharge partner in discharge planning  - Arrange for needed discharge resources and transportation as appropriate  - Identify discharge learning needs (meds, wound care, etc)  - Arrange for interpreters to assist at discharge as needed  - Consider post-discharge preferences of patient/family/discharge partner  - Complete POLST form as appropriate  - Assess patient's ability to be responsible for managing their own health  - Refer to Case Management Department for coordinating discharge planning if the patient needs post-hospital services based on physician/LIP order or complex needs related to functional status, cognitive ability or social support system  Outcome: Progressing     Problem: Altered Communication/Language Barrier  Goal: Patient/Family is able to understand and participate in their care  Description: Interventions:  - Assess communication ability and preferred communication style  - Implement communication aides and strategies  - Use visual cues when possible  - Listen attentively, be patient, do not interrupt  - Minimize distractions  - Allow time for understanding and response  - Establish method for patient to ask for assistance (call light)  - Provide an  as needed  - Communicate barriers and strategies to overcome with those who interact with patient  Outcome: Progressing

## 2022-12-23 NOTE — BH RN ADMISSION NOTE
NURSING ADMISSION NOTE      Patient admitted via transport aroud 2030. Oriented to room. Safety precautions initiated. Bed in low position. Call light in reach. Pt admitted from ER for acute renal faliure. Admission assessment complete. Admit orders received and initiated. ADMISSION NAVIGATOR COMPLETED.

## 2023-07-27 RX ORDER — SODIUM CHLORIDE, SODIUM LACTATE, POTASSIUM CHLORIDE, CALCIUM CHLORIDE 600; 310; 30; 20 MG/100ML; MG/100ML; MG/100ML; MG/100ML
INJECTION, SOLUTION INTRAVENOUS CONTINUOUS
Status: CANCELLED | OUTPATIENT
Start: 2023-07-27

## 2023-08-14 ENCOUNTER — ANESTHESIA (OUTPATIENT)
Dept: SURGERY | Facility: HOSPITAL | Age: 78
End: 2023-08-14
Payer: MEDICARE

## 2023-08-14 ENCOUNTER — ANESTHESIA EVENT (OUTPATIENT)
Dept: SURGERY | Facility: HOSPITAL | Age: 78
End: 2023-08-14
Payer: MEDICARE

## 2023-08-14 ENCOUNTER — HOSPITAL ENCOUNTER (OUTPATIENT)
Facility: HOSPITAL | Age: 78
Setting detail: HOSPITAL OUTPATIENT SURGERY
Discharge: HOME | End: 2023-08-14
Attending: OPHTHALMOLOGY | Admitting: OPHTHALMOLOGY
Payer: MEDICARE

## 2023-08-14 VITALS
HEIGHT: 71 IN | RESPIRATION RATE: 18 BRPM | OXYGEN SATURATION: 95 % | SYSTOLIC BLOOD PRESSURE: 122 MMHG | HEART RATE: 70 BPM | BODY MASS INDEX: 22.96 KG/M2 | TEMPERATURE: 98 F | DIASTOLIC BLOOD PRESSURE: 66 MMHG | WEIGHT: 164 LBS

## 2023-08-14 LAB
ANION GAP SERPL CALC-SCNC: 4 MMOL/L (ref 0–18)
BUN BLD-MCNC: 29 MG/DL (ref 7–18)
CALCIUM BLD-MCNC: 8.4 MG/DL (ref 8.5–10.1)
CHLORIDE SERPL-SCNC: 100 MMOL/L (ref 98–112)
CO2 SERPL-SCNC: 30 MMOL/L (ref 21–32)
CREAT BLD-MCNC: 7.91 MG/DL
EGFRCR SERPLBLD CKD-EPI 2021: 7 ML/MIN/1.73M2 (ref 60–?)
ERYTHROCYTE [DISTWIDTH] IN BLOOD BY AUTOMATED COUNT: 14.1 %
GLUCOSE BLD-MCNC: 97 MG/DL (ref 70–99)
HCT VFR BLD AUTO: 32.7 %
HGB BLD-MCNC: 11.3 G/DL
MCH RBC QN AUTO: 29.7 PG (ref 26–34)
MCHC RBC AUTO-ENTMCNC: 34.6 G/DL (ref 31–37)
MCV RBC AUTO: 85.8 FL
OSMOLALITY SERPL CALC.SUM OF ELEC: 284 MOSM/KG (ref 275–295)
PLATELET # BLD AUTO: 130 10(3)UL (ref 150–450)
POTASSIUM SERPL-SCNC: 3.7 MMOL/L (ref 3.5–5.1)
RBC # BLD AUTO: 3.81 X10(6)UL
SODIUM SERPL-SCNC: 134 MMOL/L (ref 136–145)
WBC # BLD AUTO: 4.7 X10(3) UL (ref 4–11)

## 2023-08-14 PROCEDURE — 08RJ3JZ REPLACEMENT OF RIGHT LENS WITH SYNTHETIC SUBSTITUTE, PERCUTANEOUS APPROACH: ICD-10-PCS | Performed by: OPHTHALMOLOGY

## 2023-08-14 PROCEDURE — 85027 COMPLETE CBC AUTOMATED: CPT | Performed by: OPHTHALMOLOGY

## 2023-08-14 PROCEDURE — 80048 BASIC METABOLIC PNL TOTAL CA: CPT | Performed by: OPHTHALMOLOGY

## 2023-08-14 DEVICE — TECNIS SMPLCTY TECNIS 1PC CLR MONO 21.0D
Type: IMPLANTABLE DEVICE | Site: EYE | Status: FUNCTIONAL
Brand: TECNIS SIMPLICITY

## 2023-08-14 RX ORDER — ONDANSETRON 2 MG/ML
4 INJECTION INTRAMUSCULAR; INTRAVENOUS EVERY 6 HOURS PRN
Status: DISCONTINUED | OUTPATIENT
Start: 2023-08-14 | End: 2023-08-14

## 2023-08-14 RX ORDER — HYDROMORPHONE HYDROCHLORIDE 1 MG/ML
0.2 INJECTION, SOLUTION INTRAMUSCULAR; INTRAVENOUS; SUBCUTANEOUS EVERY 5 MIN PRN
Status: DISCONTINUED | OUTPATIENT
Start: 2023-08-14 | End: 2023-08-14

## 2023-08-14 RX ORDER — MIDAZOLAM HYDROCHLORIDE 1 MG/ML
INJECTION INTRAMUSCULAR; INTRAVENOUS AS NEEDED
Status: DISCONTINUED | OUTPATIENT
Start: 2023-08-14 | End: 2023-08-14 | Stop reason: SURG

## 2023-08-14 RX ORDER — METOCLOPRAMIDE HYDROCHLORIDE 5 MG/ML
5 INJECTION INTRAMUSCULAR; INTRAVENOUS EVERY 8 HOURS PRN
Status: DISCONTINUED | OUTPATIENT
Start: 2023-08-14 | End: 2023-08-14

## 2023-08-14 RX ORDER — HYDROCODONE BITARTRATE AND ACETAMINOPHEN 5; 325 MG/1; MG/1
2 TABLET ORAL ONCE AS NEEDED
Status: DISCONTINUED | OUTPATIENT
Start: 2023-08-14 | End: 2023-08-14

## 2023-08-14 RX ORDER — HYDROMORPHONE HYDROCHLORIDE 1 MG/ML
0.4 INJECTION, SOLUTION INTRAMUSCULAR; INTRAVENOUS; SUBCUTANEOUS EVERY 5 MIN PRN
Status: DISCONTINUED | OUTPATIENT
Start: 2023-08-14 | End: 2023-08-14

## 2023-08-14 RX ORDER — MOXIFLOXACIN 5 MG/ML
SOLUTION/ DROPS OPHTHALMIC AS NEEDED
Status: DISCONTINUED | OUTPATIENT
Start: 2023-08-14 | End: 2023-08-14 | Stop reason: HOSPADM

## 2023-08-14 RX ORDER — CYCLOPENTOLATE HYDROCHLORIDE 10 MG/ML
1 SOLUTION/ DROPS OPHTHALMIC SEE ADMIN INSTRUCTIONS
Status: COMPLETED | OUTPATIENT
Start: 2023-08-14 | End: 2023-08-14

## 2023-08-14 RX ORDER — SODIUM CHLORIDE 9 MG/ML
INJECTION, SOLUTION INTRAVENOUS CONTINUOUS
Status: DISCONTINUED | OUTPATIENT
Start: 2023-08-14 | End: 2023-08-14

## 2023-08-14 RX ORDER — ACETAMINOPHEN 500 MG
1000 TABLET ORAL ONCE AS NEEDED
Status: DISCONTINUED | OUTPATIENT
Start: 2023-08-14 | End: 2023-08-14

## 2023-08-14 RX ORDER — HYDROCODONE BITARTRATE AND ACETAMINOPHEN 5; 325 MG/1; MG/1
1 TABLET ORAL ONCE AS NEEDED
Status: DISCONTINUED | OUTPATIENT
Start: 2023-08-14 | End: 2023-08-14

## 2023-08-14 RX ORDER — DIPHENHYDRAMINE HYDROCHLORIDE 50 MG/ML
12.5 INJECTION INTRAMUSCULAR; INTRAVENOUS AS NEEDED
Status: DISCONTINUED | OUTPATIENT
Start: 2023-08-14 | End: 2023-08-14

## 2023-08-14 RX ORDER — PHENYLEPHRINE HCL 2.5 %
1 DROPS OPHTHALMIC (EYE) SEE ADMIN INSTRUCTIONS
Status: COMPLETED | OUTPATIENT
Start: 2023-08-14 | End: 2023-08-14

## 2023-08-14 RX ORDER — LIDOCAINE HYDROCHLORIDE 10 MG/ML
INJECTION, SOLUTION EPIDURAL; INFILTRATION; INTRACAUDAL; PERINEURAL AS NEEDED
Status: DISCONTINUED | OUTPATIENT
Start: 2023-08-14 | End: 2023-08-14 | Stop reason: HOSPADM

## 2023-08-14 RX ORDER — TROPICAMIDE 10 MG/ML
1 SOLUTION/ DROPS OPHTHALMIC SEE ADMIN INSTRUCTIONS
Status: COMPLETED | OUTPATIENT
Start: 2023-08-14 | End: 2023-08-14

## 2023-08-14 RX ORDER — TETRACAINE HYDROCHLORIDE 5 MG/ML
1 SOLUTION OPHTHALMIC SEE ADMIN INSTRUCTIONS
Status: COMPLETED | OUTPATIENT
Start: 2023-08-14 | End: 2023-08-14

## 2023-08-14 RX ORDER — NALOXONE HYDROCHLORIDE 0.4 MG/ML
80 INJECTION, SOLUTION INTRAMUSCULAR; INTRAVENOUS; SUBCUTANEOUS AS NEEDED
Status: DISCONTINUED | OUTPATIENT
Start: 2023-08-14 | End: 2023-08-14

## 2023-08-14 RX ORDER — BALANCED SALT SOLUTION 6.4; .75; .48; .3; 3.9; 1.7 MG/ML; MG/ML; MG/ML; MG/ML; MG/ML; MG/ML
SOLUTION OPHTHALMIC AS NEEDED
Status: DISCONTINUED | OUTPATIENT
Start: 2023-08-14 | End: 2023-08-14 | Stop reason: HOSPADM

## 2023-08-14 RX ORDER — HYDROMORPHONE HYDROCHLORIDE 1 MG/ML
0.6 INJECTION, SOLUTION INTRAMUSCULAR; INTRAVENOUS; SUBCUTANEOUS EVERY 5 MIN PRN
Status: DISCONTINUED | OUTPATIENT
Start: 2023-08-14 | End: 2023-08-14

## 2023-08-14 RX ORDER — ACETAMINOPHEN 500 MG
1000 TABLET ORAL ONCE
Status: DISCONTINUED | OUTPATIENT
Start: 2023-08-14 | End: 2023-08-14 | Stop reason: HOSPADM

## 2023-08-14 RX ORDER — TETRACAINE HYDROCHLORIDE 5 MG/ML
SOLUTION OPHTHALMIC AS NEEDED
Status: DISCONTINUED | OUTPATIENT
Start: 2023-08-14 | End: 2023-08-14 | Stop reason: HOSPADM

## 2023-08-14 RX ORDER — LABETALOL HYDROCHLORIDE 5 MG/ML
5 INJECTION, SOLUTION INTRAVENOUS EVERY 5 MIN PRN
Status: DISCONTINUED | OUTPATIENT
Start: 2023-08-14 | End: 2023-08-14

## 2023-08-14 RX ADMIN — SODIUM CHLORIDE: 9 INJECTION, SOLUTION INTRAVENOUS at 11:22:00

## 2023-08-14 RX ADMIN — MIDAZOLAM HYDROCHLORIDE 2 MG: 1 INJECTION INTRAMUSCULAR; INTRAVENOUS at 11:23:00

## 2023-08-14 NOTE — DISCHARGE INSTRUCTIONS
[facility]  CATARACT SURGERY POST OPERATIVE INSTRUCTIONS      Call the office, [phone], if you develop severe eye pain, increasing redness, or vision loss. For the first several days, it is normal for the vision to be blurry and to see halos, arcs and starbursts of light. Make sure to wash your hands before using your eye drops and use the drops as instructed. Wait at least 2-5 minutes between each drop. Wear your eye shield while sleeping for 3 nights after surgery. You may use it longer if you are concerned that you may rub the eye or if you sleep with the eye pushing on the pillow. AVOID heavy lifting over 25 pounds and strenuous exercise/activity for 2 weeks after surgery. It is OKAY to use warm water and soft wash cloth to clean the eye drop residue from the eyelashes. DO NOT rub, push, or wipe across your eye. If you have excess tears or drops in your eyes, take a tissue and drape it around your index finger and dab gently on the inside corner of your eye, next to your nose and against the bone. AVOID dirty, april environment/activities such as gardening, cleaning the basement/garage, etc. for at least 1 week after surgery. You may shower and shampoo but avoid water directly hitting or splashing into the eye with force. Be extra gentle around the operated eye. NO swimming, hot tubs, Jacuzzis (pooled water) for at least 2 weeks after surgery. Use swim goggles for at least 1 month after surgery. You may get your hair done and it is okay to use facial makeup, try to avoid eye makeup for at least 1 week after surgery in the operative eye. You may use preservative free artificial tears as needed for dryness, itching, or scratchiness. Remember it takes approximately 4 weeks for your eye to heal and then you are able to see your optometrist for new glasses if needed.                          [facility]  CATARACT SURGERY POST OPERATIVE INSTRUCTIONS - DIABETIC    __________ EYE          WEEK 1 DAY 1 DAY 2 DAY 3 DAY 4 DAY 5 DAY 6 DAY 7   OFLOXACIN O O O O O O O O O O O O O O O O O O O O O O O O O O O O   KETOROLAC O O O O O O O O O O O O O O O O O O O O O O O O O O O O   DEXAMETHASONE O O O O O O O O O O O O O O O O O O O O O O O O O O O O           WEEK 2 DAY 1 DAY 2 DAY 3 DAY 4 DAY 5 DAY 6      DAY 7   OFLOXACIN O O O O O O O O  O O O O  O O O O O O O O O O O O O O O O   KETOROLAC O O O O O O O O O O O O O O O O O O O O O O O O O O O O   DEXAMETHASONE O O O  O O O  O O O O O O  O O O  O O O  O O O            O O O O  breakfast, lunch, dinner, bedtime  O O O breakfast, lunch, dinner  O O   breakfast, dinner  O   breakfast              [facility]  CATARACT SURGERY POST OPERATIVE INSTRUCTIONS - DIABETIC    __________ EYE          WEEK 3 DAY 1 DAY 2 DAY 3 DAY 4 DAY 5 DAY 6   DAY 7   KETOROLAC O O O O O O O O O O O O O O O O O O O O O O O O O O O O   DEXAMETHASONE O O O O O O O O O O O O O O           WEEK 4 DAY 1 DAY 2 DAY 3 DAY 4 DAY 5 DAY 6  DAY 7   KETOROLAC O O O O O O O O O O O O O O O O O O O O O O O O O O O O   DEXAMETHASONE O  O  O  O  O  O  O            O O O O  breakfast, lunch, dinner, bedtime  O O O breakfast, lunch, dinner  O O   breakfast, dinner  O   breakfast

## 2023-10-22 ENCOUNTER — HOSPITAL ENCOUNTER (INPATIENT)
Facility: HOSPITAL | Age: 78
LOS: 2 days | Discharge: HOME | End: 2023-10-24
Attending: EMERGENCY MEDICINE | Admitting: INTERNAL MEDICINE
Payer: MEDICARE

## 2023-10-22 ENCOUNTER — APPOINTMENT (OUTPATIENT)
Dept: CT IMAGING | Facility: HOSPITAL | Age: 78
End: 2023-10-22
Attending: EMERGENCY MEDICINE
Payer: MEDICARE

## 2023-10-22 DIAGNOSIS — F02.818 LATE ONSET ALZHEIMER'S DEMENTIA WITH BEHAVIORAL DISTURBANCE (HCC): ICD-10-CM

## 2023-10-22 DIAGNOSIS — I65.09 VERTEBRAL ARTERY STENOSIS, UNSPECIFIED LATERALITY: Primary | ICD-10-CM

## 2023-10-22 DIAGNOSIS — G30.1 LATE ONSET ALZHEIMER'S DEMENTIA WITH BEHAVIORAL DISTURBANCE (HCC): ICD-10-CM

## 2023-10-22 DIAGNOSIS — R26.81 UNSTEADY GAIT: ICD-10-CM

## 2023-10-22 DIAGNOSIS — H53.2 DIPLOPIA: ICD-10-CM

## 2023-10-22 LAB
ALBUMIN SERPL-MCNC: 3.5 G/DL (ref 3.4–5)
ALBUMIN/GLOB SERPL: 0.9 {RATIO} (ref 1–2)
ALP LIVER SERPL-CCNC: 149 U/L
ALT SERPL-CCNC: 18 U/L
ANION GAP SERPL CALC-SCNC: 6 MMOL/L (ref 0–18)
AST SERPL-CCNC: 11 U/L (ref 15–37)
BASOPHILS # BLD AUTO: 0.03 X10(3) UL (ref 0–0.2)
BASOPHILS NFR BLD AUTO: 0.6 %
BILIRUB SERPL-MCNC: 0.6 MG/DL (ref 0.1–2)
BUN BLD-MCNC: 39 MG/DL (ref 7–18)
CALCIUM BLD-MCNC: 8.7 MG/DL (ref 8.5–10.1)
CHLORIDE SERPL-SCNC: 97 MMOL/L (ref 98–112)
CO2 SERPL-SCNC: 33 MMOL/L (ref 21–32)
CREAT BLD-MCNC: 8.8 MG/DL
EGFRCR SERPLBLD CKD-EPI 2021: 6 ML/MIN/1.73M2 (ref 60–?)
EOSINOPHIL # BLD AUTO: 0.39 X10(3) UL (ref 0–0.7)
EOSINOPHIL NFR BLD AUTO: 8 %
ERYTHROCYTE [DISTWIDTH] IN BLOOD BY AUTOMATED COUNT: 13.8 %
GLOBULIN PLAS-MCNC: 4 G/DL (ref 2.8–4.4)
GLUCOSE BLD-MCNC: 94 MG/DL (ref 70–99)
GLUCOSE BLD-MCNC: 95 MG/DL (ref 70–99)
HCT VFR BLD AUTO: 30.7 %
HGB BLD-MCNC: 10.7 G/DL
IMM GRANULOCYTES # BLD AUTO: 0.02 X10(3) UL (ref 0–1)
IMM GRANULOCYTES NFR BLD: 0.4 %
LYMPHOCYTES # BLD AUTO: 1.52 X10(3) UL (ref 1–4)
LYMPHOCYTES NFR BLD AUTO: 31 %
MCH RBC QN AUTO: 30.3 PG (ref 26–34)
MCHC RBC AUTO-ENTMCNC: 34.9 G/DL (ref 31–37)
MCV RBC AUTO: 87 FL
MONOCYTES # BLD AUTO: 0.53 X10(3) UL (ref 0.1–1)
MONOCYTES NFR BLD AUTO: 10.8 %
NEUTROPHILS # BLD AUTO: 2.41 X10 (3) UL (ref 1.5–7.7)
NEUTROPHILS # BLD AUTO: 2.41 X10(3) UL (ref 1.5–7.7)
NEUTROPHILS NFR BLD AUTO: 49.2 %
OSMOLALITY SERPL CALC.SUM OF ELEC: 291 MOSM/KG (ref 275–295)
PLATELET # BLD AUTO: 116 10(3)UL (ref 150–450)
POTASSIUM SERPL-SCNC: 4 MMOL/L (ref 3.5–5.1)
PROT SERPL-MCNC: 7.5 G/DL (ref 6.4–8.2)
RBC # BLD AUTO: 3.53 X10(6)UL
SODIUM SERPL-SCNC: 136 MMOL/L (ref 136–145)
TROPONIN I HIGH SENSITIVITY: 18 NG/L
WBC # BLD AUTO: 4.9 X10(3) UL (ref 4–11)

## 2023-10-22 PROCEDURE — 70496 CT ANGIOGRAPHY HEAD: CPT | Performed by: EMERGENCY MEDICINE

## 2023-10-22 PROCEDURE — 70498 CT ANGIOGRAPHY NECK: CPT | Performed by: EMERGENCY MEDICINE

## 2023-10-22 RX ORDER — TACROLIMUS 0.5 MG/1
0.5 CAPSULE ORAL 2 TIMES DAILY
Status: DISCONTINUED | OUTPATIENT
Start: 2023-10-23 | End: 2023-10-24

## 2023-10-22 RX ORDER — TACROLIMUS 1 MG/1
1 CAPSULE ORAL 2 TIMES DAILY
Status: DISCONTINUED | OUTPATIENT
Start: 2023-10-23 | End: 2023-10-24

## 2023-10-22 RX ORDER — TACROLIMUS 1 MG/1
1 CAPSULE ORAL 2 TIMES DAILY
Status: CANCELLED | OUTPATIENT
Start: 2023-10-22

## 2023-10-22 RX ORDER — ONDANSETRON 2 MG/ML
4 INJECTION INTRAMUSCULAR; INTRAVENOUS EVERY 6 HOURS PRN
Status: DISCONTINUED | OUTPATIENT
Start: 2023-10-22 | End: 2023-10-23

## 2023-10-22 RX ORDER — HYDROXYZINE HYDROCHLORIDE 25 MG/1
25 TABLET, FILM COATED ORAL EVERY 8 HOURS PRN
Status: DISCONTINUED | OUTPATIENT
Start: 2023-10-22 | End: 2023-10-24

## 2023-10-22 RX ORDER — BISACODYL 10 MG
10 SUPPOSITORY, RECTAL RECTAL
Status: DISCONTINUED | OUTPATIENT
Start: 2023-10-22 | End: 2023-10-24

## 2023-10-22 RX ORDER — FOLIC ACID/VIT B COMPLEX AND C 0.8 MG
0.8 TABLET ORAL DAILY
COMMUNITY

## 2023-10-22 RX ORDER — TACROLIMUS 1 MG/1
2 CAPSULE ORAL EVERY EVENING
Status: DISCONTINUED | OUTPATIENT
Start: 2023-10-22 | End: 2023-10-22

## 2023-10-22 RX ORDER — SEVELAMER CARBONATE 800 MG/1
800 TABLET, FILM COATED ORAL
Status: DISCONTINUED | OUTPATIENT
Start: 2023-10-23 | End: 2023-10-24

## 2023-10-22 RX ORDER — SENNOSIDES 8.6 MG
17.2 TABLET ORAL NIGHTLY PRN
Status: DISCONTINUED | OUTPATIENT
Start: 2023-10-22 | End: 2023-10-24

## 2023-10-22 RX ORDER — METOCLOPRAMIDE HYDROCHLORIDE 5 MG/ML
5 INJECTION INTRAMUSCULAR; INTRAVENOUS EVERY 8 HOURS PRN
Status: DISCONTINUED | OUTPATIENT
Start: 2023-10-22 | End: 2023-10-22

## 2023-10-22 RX ORDER — HYDROCODONE BITARTRATE AND ACETAMINOPHEN 5; 325 MG/1; MG/1
1 TABLET ORAL EVERY 4 HOURS PRN
Status: DISCONTINUED | OUTPATIENT
Start: 2023-10-22 | End: 2023-10-24

## 2023-10-22 RX ORDER — GABAPENTIN 100 MG/1
100 CAPSULE ORAL
Status: DISCONTINUED | OUTPATIENT
Start: 2023-10-24 | End: 2023-10-24

## 2023-10-22 RX ORDER — ACETAMINOPHEN 325 MG/1
650 TABLET ORAL EVERY 4 HOURS PRN
Status: DISCONTINUED | OUTPATIENT
Start: 2023-10-22 | End: 2023-10-24

## 2023-10-22 RX ORDER — ACETAMINOPHEN 500 MG
500 TABLET ORAL EVERY 4 HOURS PRN
Status: DISCONTINUED | OUTPATIENT
Start: 2023-10-22 | End: 2023-10-24

## 2023-10-22 RX ORDER — MELATONIN
3 NIGHTLY PRN
Status: DISCONTINUED | OUTPATIENT
Start: 2023-10-22 | End: 2023-10-24

## 2023-10-22 RX ORDER — RISPERIDONE 0.25 MG/1
0.25 TABLET ORAL NIGHTLY
Status: DISCONTINUED | OUTPATIENT
Start: 2023-10-22 | End: 2023-10-23

## 2023-10-22 RX ORDER — POLYETHYLENE GLYCOL 3350 17 G/17G
17 POWDER, FOR SOLUTION ORAL DAILY PRN
Status: DISCONTINUED | OUTPATIENT
Start: 2023-10-22 | End: 2023-10-24

## 2023-10-22 RX ORDER — ASPIRIN 81 MG/1
324 TABLET, CHEWABLE ORAL ONCE
Status: COMPLETED | OUTPATIENT
Start: 2023-10-22 | End: 2023-10-22

## 2023-10-22 RX ORDER — SEVELAMER CARBONATE 800 MG/1
800 TABLET, FILM COATED ORAL
COMMUNITY

## 2023-10-22 RX ORDER — MEMANTINE HYDROCHLORIDE 5 MG/1
5 TABLET ORAL DAILY
Status: DISCONTINUED | OUTPATIENT
Start: 2023-10-22 | End: 2023-10-23

## 2023-10-22 RX ORDER — GABAPENTIN 100 MG/1
100 CAPSULE ORAL
COMMUNITY

## 2023-10-22 RX ORDER — ALLOPURINOL 300 MG/1
150 TABLET ORAL DAILY
Status: DISCONTINUED | OUTPATIENT
Start: 2023-10-22 | End: 2023-10-23

## 2023-10-22 RX ORDER — BUMETANIDE 1 MG/1
2 TABLET ORAL 2 TIMES DAILY
Status: DISCONTINUED | OUTPATIENT
Start: 2023-10-22 | End: 2023-10-23

## 2023-10-22 RX ORDER — RISPERIDONE 0.25 MG/1
0.25 TABLET ORAL DAILY PRN
Status: DISCONTINUED | OUTPATIENT
Start: 2023-10-22 | End: 2023-10-23

## 2023-10-22 RX ORDER — HYDROCODONE BITARTRATE AND ACETAMINOPHEN 5; 325 MG/1; MG/1
2 TABLET ORAL EVERY 4 HOURS PRN
Status: DISCONTINUED | OUTPATIENT
Start: 2023-10-22 | End: 2023-10-24

## 2023-10-22 RX ORDER — DONEPEZIL HYDROCHLORIDE 10 MG/1
10 TABLET, FILM COATED ORAL NIGHTLY
Status: DISCONTINUED | OUTPATIENT
Start: 2023-10-22 | End: 2023-10-24

## 2023-10-22 RX ORDER — ASCORBIC ACID, THIAMINE, RIBOFLAVIN, NIACINAMIDE, PYRIDOXINE, FOLIC ACID, COBALAMIN, BIOTIN, PANTOTHENIC ACID 100; 1.5; 1.7; 20; 10; 1; 6; 300; 1 MG/1; MG/1; MG/1; MG/1; MG/1; MG/1; UG/1; UG/1; MG/1
1 TABLET, COATED ORAL DAILY
Status: DISCONTINUED | OUTPATIENT
Start: 2023-10-23 | End: 2023-10-24

## 2023-10-22 NOTE — ED INITIAL ASSESSMENT (HPI)
Pt's wife states that the pt has been unsteady on his feet since this morning. Pt c/o of a HA earlier today but currently resolved. Pt had dialysis yesterday. Pt c/o of double vision. Pt c/o of left arm weakness \" for a while\".

## 2023-10-23 ENCOUNTER — APPOINTMENT (OUTPATIENT)
Dept: CV DIAGNOSTICS | Facility: HOSPITAL | Age: 78
End: 2023-10-23
Attending: NURSE PRACTITIONER
Payer: MEDICARE

## 2023-10-23 PROBLEM — N18.6 ESRD ON HEMODIALYSIS (HCC): Status: ACTIVE | Noted: 2023-10-23

## 2023-10-23 PROBLEM — D63.1 ANEMIA IN ESRD (END-STAGE RENAL DISEASE)  (HCC): Status: ACTIVE | Noted: 2023-10-23

## 2023-10-23 PROBLEM — N18.6 ANEMIA IN ESRD (END-STAGE RENAL DISEASE): Status: ACTIVE | Noted: 2023-10-23

## 2023-10-23 PROBLEM — N18.6 ANEMIA IN ESRD (END-STAGE RENAL DISEASE) (HCC): Status: ACTIVE | Noted: 2023-10-23

## 2023-10-23 PROBLEM — D63.1 ANEMIA IN ESRD (END-STAGE RENAL DISEASE): Status: ACTIVE | Noted: 2023-10-23

## 2023-10-23 PROBLEM — D63.1 ANEMIA IN ESRD (END-STAGE RENAL DISEASE) (HCC): Status: ACTIVE | Noted: 2023-10-23

## 2023-10-23 PROBLEM — N18.6 ANEMIA IN ESRD (END-STAGE RENAL DISEASE)  (HCC): Status: ACTIVE | Noted: 2023-10-23

## 2023-10-23 PROBLEM — Z99.2 ESRD ON HEMODIALYSIS (HCC): Status: ACTIVE | Noted: 2023-10-23

## 2023-10-23 LAB
ANION GAP SERPL CALC-SCNC: 8 MMOL/L (ref 0–18)
ATRIAL RATE: 69 BPM
BASOPHILS # BLD AUTO: 0.03 X10(3) UL (ref 0–0.2)
BASOPHILS NFR BLD AUTO: 0.7 %
BUN BLD-MCNC: 42 MG/DL (ref 7–18)
CALCIUM BLD-MCNC: 8.8 MG/DL (ref 8.5–10.1)
CHLORIDE SERPL-SCNC: 95 MMOL/L (ref 98–112)
CHOLEST SERPL-MCNC: 162 MG/DL (ref ?–200)
CO2 SERPL-SCNC: 31 MMOL/L (ref 21–32)
CREAT BLD-MCNC: 9.79 MG/DL
EGFRCR SERPLBLD CKD-EPI 2021: 5 ML/MIN/1.73M2 (ref 60–?)
EOSINOPHIL # BLD AUTO: 0.37 X10(3) UL (ref 0–0.7)
EOSINOPHIL NFR BLD AUTO: 8.2 %
ERYTHROCYTE [DISTWIDTH] IN BLOOD BY AUTOMATED COUNT: 13.6 %
EST. AVERAGE GLUCOSE BLD GHB EST-MCNC: 111 MG/DL (ref 68–126)
GLUCOSE BLD-MCNC: 102 MG/DL (ref 70–99)
GLUCOSE BLD-MCNC: 103 MG/DL (ref 70–99)
GLUCOSE BLD-MCNC: 123 MG/DL (ref 70–99)
GLUCOSE BLD-MCNC: 134 MG/DL (ref 70–99)
GLUCOSE BLD-MCNC: 136 MG/DL (ref 70–99)
HBA1C MFR BLD: 5.5 % (ref ?–5.7)
HCT VFR BLD AUTO: 31.2 %
HDLC SERPL-MCNC: 69 MG/DL (ref 40–59)
HGB BLD-MCNC: 10.8 G/DL
IMM GRANULOCYTES # BLD AUTO: 0.01 X10(3) UL (ref 0–1)
IMM GRANULOCYTES NFR BLD: 0.2 %
LDLC SERPL CALC-MCNC: 78 MG/DL (ref ?–100)
LYMPHOCYTES # BLD AUTO: 1.54 X10(3) UL (ref 1–4)
LYMPHOCYTES NFR BLD AUTO: 34.3 %
MCH RBC QN AUTO: 30.3 PG (ref 26–34)
MCHC RBC AUTO-ENTMCNC: 34.6 G/DL (ref 31–37)
MCV RBC AUTO: 87.6 FL
MONOCYTES # BLD AUTO: 0.45 X10(3) UL (ref 0.1–1)
MONOCYTES NFR BLD AUTO: 10 %
NEUTROPHILS # BLD AUTO: 2.09 X10 (3) UL (ref 1.5–7.7)
NEUTROPHILS # BLD AUTO: 2.09 X10(3) UL (ref 1.5–7.7)
NEUTROPHILS NFR BLD AUTO: 46.6 %
NONHDLC SERPL-MCNC: 93 MG/DL (ref ?–130)
OSMOLALITY SERPL CALC.SUM OF ELEC: 289 MOSM/KG (ref 275–295)
P AXIS: 40 DEGREES
P-R INTERVAL: 174 MS
PLATELET # BLD AUTO: 107 10(3)UL (ref 150–450)
POTASSIUM SERPL-SCNC: 3.8 MMOL/L (ref 3.5–5.1)
Q-T INTERVAL: 502 MS
QRS DURATION: 140 MS
QTC CALCULATION (BEZET): 537 MS
R AXIS: 74 DEGREES
RBC # BLD AUTO: 3.56 X10(6)UL
SODIUM SERPL-SCNC: 134 MMOL/L (ref 136–145)
T AXIS: -58 DEGREES
TRIGL SERPL-MCNC: 78 MG/DL (ref 30–149)
VENTRICULAR RATE: 69 BPM
VLDLC SERPL CALC-MCNC: 12 MG/DL (ref 0–30)
WBC # BLD AUTO: 4.5 X10(3) UL (ref 4–11)

## 2023-10-23 PROCEDURE — 93306 TTE W/DOPPLER COMPLETE: CPT | Performed by: NURSE PRACTITIONER

## 2023-10-23 PROCEDURE — 99222 1ST HOSP IP/OBS MODERATE 55: CPT | Performed by: INTERNAL MEDICINE

## 2023-10-23 PROCEDURE — 99223 1ST HOSP IP/OBS HIGH 75: CPT | Performed by: INTERNAL MEDICINE

## 2023-10-23 RX ORDER — MEMANTINE HYDROCHLORIDE 10 MG/1
10 TABLET ORAL DAILY
Status: DISCONTINUED | OUTPATIENT
Start: 2023-10-23 | End: 2023-10-23

## 2023-10-23 RX ORDER — ALLOPURINOL 100 MG/1
50 TABLET ORAL DAILY
Status: DISCONTINUED | OUTPATIENT
Start: 2023-10-23 | End: 2023-10-24

## 2023-10-23 RX ORDER — PRAVASTATIN SODIUM 20 MG
20 TABLET ORAL NIGHTLY
Status: DISCONTINUED | OUTPATIENT
Start: 2023-10-23 | End: 2023-10-24

## 2023-10-23 RX ORDER — RISPERIDONE 0.25 MG/1
0.25 TABLET ORAL DAILY PRN
Status: DISCONTINUED | OUTPATIENT
Start: 2023-10-23 | End: 2023-10-24

## 2023-10-23 RX ORDER — MEMANTINE HYDROCHLORIDE 10 MG/1
10 TABLET ORAL 2 TIMES DAILY
Status: DISCONTINUED | OUTPATIENT
Start: 2023-10-23 | End: 2023-10-24

## 2023-10-23 RX ORDER — HEPARIN SODIUM 1000 [USP'U]/ML
1.5 INJECTION, SOLUTION INTRAVENOUS; SUBCUTANEOUS ONCE
Status: DISCONTINUED | OUTPATIENT
Start: 2023-10-23 | End: 2023-10-24

## 2023-10-23 RX ORDER — RISPERIDONE 0.25 MG/1
0.5 TABLET ORAL NIGHTLY
Status: DISCONTINUED | OUTPATIENT
Start: 2023-10-23 | End: 2023-10-24

## 2023-10-23 RX ORDER — BUMETANIDE 1 MG/1
2 TABLET ORAL
Status: DISCONTINUED | OUTPATIENT
Start: 2023-10-23 | End: 2023-10-24

## 2023-10-23 NOTE — PLAN OF CARE
NURSING ADMISSION NOTE    Assumed pt care @ 2200  Admission navigator complete   Pt A/Ox2, NSR on tele, & on RA   Neuro checks per protocol; see flowsheets   Comfort & safety measures in place   Call light within reach    Patient admitted via [address] to room. Safety precautions initiated. Bed in low position. Call light in reach.

## 2023-10-23 NOTE — PHYSICAL THERAPY NOTE
PHYSICAL THERAPY EVALUATION - INPATIENT     Room Number: 7238/7821-N  Evaluation Date: 10/23/2023  Type of Evaluation: Initial  Physician Order: PT Eval and Treat    Presenting Problem: Vertebral Artery Stenosis  Co-Morbidities : Alzheimers, CHF, depression, heart transplant, ESRD on HD  Reason for Therapy: Mobility Dysfunction and Discharge Planning    History related to current admission: Patient is a 70year old male admitted on 10/22/2023 from Home for Vertebral Artery Stenosis. ASSESSMENT   In this PT evaluation, the patient presents with the following impairments General Weakness. These impairments and comorbidities manifest themselves as functional limitations in independent bed mobility, transfers, and gait. The patient is below baseline and would benefit from skilled inpatient PT to address the above deficits to assist patient in returning to prior to level of function. Functional outcome measures completed include [facility]. The -PAC '6-Clicks' Inpatient Basic Mobility Short Form was completed and this patient is demonstrating a Approx Degree of Impairment: 28.97%  degree of impairment in mobility. Research supports that patients with this level of impairment may benefit from Home.     DISCHARGE RECOMMENDATIONS  PT Discharge Recommendations: Home    PLAN  PT Treatment Plan: Gait training;Stair training;Strengthening;Transfer training;Balance training  Rehab Potential : Good  Frequency (Obs): 3-5x/week  Number of Visits to Meet Established Goals: 3      CURRENT GOALS    Goal #1 Patient is able to ambulate 150 feet with assist device: cane - straight at assistance level: supervision     Goal #2      Goal #3      Goal #4    Goal #5    Goal #6    Goal Comments: Goals established on 10/23/2023    HOME SITUATION  Type of Home: House   Home Layout: One level                Lives With: Spouse  Drives: No  Patient Owned Equipment: Cane  Patient Regularly Uses: Glasses    Prior Level of [place]: Pt's wife was present to assist with pt's PLOF. Pt lives at home with his wife. Pt's wife reports that pt uses a SPC to ambulate around the home. Pt does not need assistance with his ADLs. SUBJECTIVE  \"I am feeling better\"      OBJECTIVE  Precautions: Bed/chair alarm  Fall Risk: High fall risk    WEIGHT BEARING RESTRICTION  Weight Bearing Restriction: None                PAIN ASSESSMENT  Ratin  Location: Pt reported of no pain       COGNITION  Overall Cognitive Status:  WFL - within functional limits    RANGE OF MOTION AND STRENGTH ASSESSMENT  Upper extremity ROM and strength are within functional limits     Lower extremity ROM is within functional limits     Lower extremity strength is within functional limits       BALANCE  Static Sitting: Good  Dynamic Sitting: Good  Static Standing: Fair +  Dynamic Standing: Fair +    ADDITIONAL TESTS                                    ACTIVITY TOLERANCE                         O2 WALK       NEUROLOGICAL FINDINGS                        AM-PAC '6-Clicks' INPATIENT SHORT FORM - BASIC MOBILITY  How much difficulty does the patient currently have. .. Patient Difficulty: Turning over in bed (including adjusting bedclothes, sheets and blankets)?: None   Patient Difficulty: Sitting down on and standing up from a chair with arms (e.g., wheelchair, bedside commode, etc.): None   Patient Difficulty: Moving from lying on back to sitting on the side of the bed?: None   How much help from another person does the patient currently need. ..    Help from Another: Moving to and from a bed to a chair (including a wheelchair)?: A Little   Help from Another: Need to walk in hospital room?: A Little   Help from Another: Climbing 3-5 steps with a railing?: A Little       AM-PAC Score:  Raw Score: 21   Approx Degree of Impairment: 28.97%   Standardized Score (AM-PAC Scale): 50.25   CMS Modifier (G-Code): CJ    FUNCTIONAL ABILITY STATUS  Gait Assessment   Functional Mobility/Gait Assessment  Gait Assistance: Supervision;Contact guard assist  Distance (ft): 70,15  Assistive Device: Rolling walker;Cane  Pattern: Shuffle (decrease zackery)    Skilled Therapy Provided     Bed Mobility:  Rolling: NT  Supine to sit: Mod I   Sit to supine: NT     Transfer Mobility:  Sit to stand: Mod I   Stand to sit: Mod I  Gait = 70ft using RW with Supervision, 15ft using SPC with CGA    Therapist's Comments: Pt was demonstrate ambulation without LOB when ambulating with an assisted device. Pt demonstrated an improve zackery with the use of the RW versus SPC. Educated pt on the benefits on using the [facility], however pt preferred to use [facility]. During ambulation pt required occasional verbal cues to to bring head up due to pt having a tendency to look at the ground. Exercise/Education Provided:  Bed mobility  Body mechanics  Gait training  Strengthening  Transfer training    Patient End of Session: Up in chair; All patient questions and concerns addressed; Alarm set;Call light within reach; Needs met      Patient Evaluation Complexity Level:  History Moderate - 1 or 2 personal factors and/or co-morbidities   Examination of body systems Low - addressing 1-2 elements   Clinical Presentation Low - Stable   Clinical Decision Making Low - Stable       PT Session Time: 23 minutes  Therapeutic Activity: 15 minutes

## 2023-10-23 NOTE — PROGRESS NOTES
Stroke booklet given to patient; the following education was provided:     BEFAST - Stroke warning signs and symptoms  Personalized risk factors including HTN, HL  Need for follow-up after discharge  How to activate EMS for stroke  Healthy lifestyle (nutrition and exercise)    Spouse present during education. Patient receptive to teachings. All pertinent questions and concerns were addressed.      Paco Louis RN, BSN  Stroke Navigator  [phone]

## 2023-10-23 NOTE — PLAN OF CARE
Pt AxO2  RA  NSR  No pain at this time  1x assist with cane/walker  -PT c/s  -OT c/s  -SLP c/s  -up to chair    Neurochecks  -deficits noted  MRI(pend)  MRA(pend)  ECHO completed  HD access lines noted  -Dialysis called for tomorrow  Monitor/manage BP/BG level  Pt informed of POC, unable to assess cognition of understanding  -pt family updated

## 2023-10-23 NOTE — ED QUICK NOTES
Orders for admission, patient is aware of plan and ready to go upstairs. Any questions, please call ED RN Thelma Chaney at extension [phone].      Patient Covid vaccination status: Fully vaccinated     COVID Test Ordered in ED: None    COVID Suspicion at Admission: N/A    Running Infusions:  None    Mental Status/LOC at time of transport: 3/3    Other pertinent information:   CIWA score: N/A   NIH score:  0

## 2023-10-23 NOTE — SLP NOTE
ADULT SWALLOWING EVALUATION    ASSESSMENT    ASSESSMENT/OVERALL IMPRESSION:  Order received for bedside swallow evaluation to r/o aspiration per CVA protocol. Pt presented to [facility] with reports of HA, double vision and balance issues. Pmhx includes heart transplant, ESRD on hemodialysis and dementia. MRI pending. Pt passed RN dysphagia screening and placed on a regular consistency diet with thin liquids. Pt found sitting semi-upright in bed; alert & participatory; able to follow simple commands however difficulty some difficulty with lengthier/more complex; able to participate in basic conversation of known context however admits to some difficulty with memory skills. Oral mech/motor exam revealed pt with his own dentition which appeared intact & functional. Oral cavity appeared clean & moist. No significant oral motor deficits discovered. Clear vocal quality, volitional swallow and productive cough elicited. Head of bed elevated to 90 degrees and pt observed feeding himself po trials of thin via cup & straw, pureed and cracker consistency. Adequate retrieval & containment with timely mastication & transit; no significant oral residue observed post swallow. Pharyngeal response appeared timely with hyolaryngeal elevation palpated and clear vocal quality following all po trials. No overt clinical s/s of aspiration noted. Swallow mechanism appears to be grossly intact with no overt clinical s/s of aspiration noted. Following exam, discussed results, aspiration risks, diet recommendations, aspiration precautions and plan with patient. He reported good understanding however question to what level. Will inform RN of result and recommendations. Will continue to follow as per plan. Asif Assessment of Swallow Function Score: No abnormality detected    RECOMMENDATIONS   Diet Recommendations - Solids: Regular  Diet Recommendations - Liquids:  Thin Liquids                        Compensatory Strategies Recommended: Slow rate; Alternate consistencies;Small bites and sips;Multiple swallows  Aspiration Precautions: Upright position; Slow rate;Small bites and sips  Medication Administration Recommendations: One pill at a time (take whole or crushed in pureed if any difficulty)  Treatment Plan/Recommendations: Dysphagia therapy; Aspiration precautions (communiation screening pending neuro workup)  Discharge Recommendations/Plan: Home    HISTORY   MEDICAL HISTORY  Reason for Referral: Stroke protocol    Problem List  Principal Problem:    Vertebral artery stenosis, unspecified laterality  Active Problems:    Unsteady gait    Diplopia      Past Medical History  Past Medical History:   Diagnosis Date    Asthma     BPH (benign prostatic hyperplasia)     CKD (chronic kidney disease)     Congestive heart disease ([facility] [address]     Dialysis patient ([facility] 07/27/2023    HEMODIALYSIS TUES/THURS/SAT 3.5HRS    Esophageal reflux     Fistula     RIGHT ARM    Glaucoma     Gout     Hearing impairment     HA's x2    Heart transplanted [facility]) 2006 2006-@ [facility]    High blood pressure     High cholesterol     Hypercholesterolemia     Renal disorder     CKD    Short-term memory loss     Sleep apnea     no tx    Visual impairment     glasses       Prior Living Situation: Home with spouse  Diet Prior to Admission: Regular; Thin liquids       Patient/Family Goals: safest/least restrictive diet    SWALLOWING HISTORY  Current Diet Consistency: Regular; Thin liquids  Dysphagia History: denied  Imaging Results: no recent CXR     SUBJECTIVE       OBJECTIVE   ORAL MOTOR EXAMINATION  Dentition: Natural;Functional  Symmetry: Within Functional Limits  Strength: Within Functional Limits  Tone: Within Functional Limits  Range of Motion: Within Functional Limits  Rate of Motion: Within Functional Limits    Voice Quality: Clear  Respiratory Status: Unlabored  Consistencies Trialed:  Thin liquids;Puree;Hard solid  Method of Presentation: Self presentation;Spoon;Cup;Straw;Single sips; Consecutive swallows  Patient Positioning: Upright;Midline (pt lying down in bed; HOB elevated to 90 degrees)    Oral Phase of Swallow: Within Functional Limits                      Pharyngeal Phase of Swallow:  (appears to be [facility] - no overt clinical s/s of aspiration noted)           (Please note: Silent aspiration cannot be evaluated clinically. Videofluoroscopic Swallow Study is required to rule-out silent aspiration.)    Esophageal Phase of Swallow: No complaints consistent with possible esophageal involvement  Comments:               GOALS  Goal #1 The patient will tolerate regular consistency and thin liquids without overt signs or symptoms of aspiration with 95 % accuracy over 1-2 session(s). New goal   Goal #2 The patient/family/caregiver will demonstrate understanding and implementation of aspiration precautions and swallow strategies independently over 1-2 session(s). New goal   Goal #3 The patient will utilize compensatory strategies as outlined by  BSSE (clinical evaluation) including Slow rate, Small bites, Small sips, Alternate liquids/solids, Upright 90 degrees with as needed assistance 100 % of the time across 2 sessions. New goal   Goal #4 Complete communication screening pending neuro workup    TBD                         FOLLOW UP  Treatment Plan/Recommendations: Dysphagia therapy; Aspiration precautions (communiation screening pending neuro workup)  Number of Visits to Meet Established Goals: 2  Follow Up Needed (Documentation Required): Yes  SLP Follow-up Date: 10/24/23    Thank you for your referral.   If you have any questions, please contact Brock Evangelista, [place]LP    [place], [id number] [facility]  Pager

## 2023-10-24 ENCOUNTER — APPOINTMENT (OUTPATIENT)
Dept: MRI IMAGING | Facility: HOSPITAL | Age: 78
End: 2023-10-24
Attending: INTERNAL MEDICINE
Payer: MEDICARE

## 2023-10-24 VITALS
DIASTOLIC BLOOD PRESSURE: 62 MMHG | HEART RATE: 68 BPM | HEIGHT: 71.65 IN | RESPIRATION RATE: 17 BRPM | SYSTOLIC BLOOD PRESSURE: 98 MMHG | TEMPERATURE: 98 F | OXYGEN SATURATION: 96 % | BODY MASS INDEX: 22.58 KG/M2 | WEIGHT: 164.88 LBS

## 2023-10-24 PROBLEM — G93.40 ENCEPHALOPATHY ACUTE: Status: ACTIVE | Noted: 2023-10-24

## 2023-10-24 PROBLEM — N18.6 ESRD (END STAGE RENAL DISEASE) (HCC): Status: ACTIVE | Noted: 2023-10-23

## 2023-10-24 LAB
GLUCOSE BLD-MCNC: 111 MG/DL (ref 70–99)
GLUCOSE BLD-MCNC: 142 MG/DL (ref 70–99)
HBV SURFACE AB SER QL: NONREACTIVE
HBV SURFACE AB SERPL IA-ACNC: <3.1 MIU/ML
HBV SURFACE AG SER-ACNC: 0.13 [IU]/L
HBV SURFACE AG SERPL QL IA: NONREACTIVE

## 2023-10-24 PROCEDURE — 99232 SBSQ HOSP IP/OBS MODERATE 35: CPT | Performed by: NURSE PRACTITIONER

## 2023-10-24 PROCEDURE — 70551 MRI BRAIN STEM W/O DYE: CPT | Performed by: INTERNAL MEDICINE

## 2023-10-24 PROCEDURE — 70544 MR ANGIOGRAPHY HEAD W/O DYE: CPT | Performed by: INTERNAL MEDICINE

## 2023-10-24 PROCEDURE — 90935 HEMODIALYSIS ONE EVALUATION: CPT | Performed by: INTERNAL MEDICINE

## 2023-10-24 PROCEDURE — 5A1D70Z PERFORMANCE OF URINARY FILTRATION, INTERMITTENT, LESS THAN 6 HOURS PER DAY: ICD-10-PCS | Performed by: INTERNAL MEDICINE

## 2023-10-24 RX ORDER — RISPERIDONE 0.25 MG/1
TABLET ORAL
Qty: 60 TABLET | Refills: 0 | Status: ON HOLD | OUTPATIENT
Start: 2023-10-24 | End: 2023-11-07

## 2023-10-24 RX ORDER — TACROLIMUS 1 MG/1
1 CAPSULE ORAL 2 TIMES DAILY
Status: SHIPPED | COMMUNITY
Start: 2023-10-24

## 2023-10-24 RX ORDER — MEMANTINE HYDROCHLORIDE 5 MG/1
10 TABLET ORAL 2 TIMES DAILY
Status: SHIPPED | COMMUNITY
Start: 2023-10-24

## 2023-10-24 RX ORDER — ALBUMIN (HUMAN) 12.5 G/50ML
100 SOLUTION INTRAVENOUS AS NEEDED
Status: DISCONTINUED | OUTPATIENT
Start: 2023-10-24 | End: 2023-10-24

## 2023-10-24 RX ORDER — MIDODRINE HYDROCHLORIDE 10 MG/1
10 TABLET ORAL
Status: DISCONTINUED | OUTPATIENT
Start: 2023-10-24 | End: 2023-10-24

## 2023-10-24 NOTE — PLAN OF CARE
Resumed care at 0730. Aox2, dementia baseline. OOB to commode, assist x1 with cane. To MRI this morning, patient educated on testing. IV removed by patient before shift, found on counter. Notified Dr. Elan Gaines, no need for another IV. Dialysis this morning, tolerated well, albumin given x1. [id number] Kendy Arias for discharge per all services. Discharge instructions given and explained to patient and wife, verbalized understanding. Discharged with all belongings. Wheelchair to lobby, wife to give ride home.

## 2023-10-24 NOTE — PLAN OF CARE
Assumed pt care @ 1930   Pt A/Ox2, NSR w/BBB on tele, & on RA   Neuro checks Q4H; no new deficits   Plan for MRI today   Comfort & safety needs met  Pt updated on POC

## 2023-10-24 NOTE — CM/SW NOTE
10/24/23 1500   CM/SW Referral Data   Referral Source Social Work (self-referral)   Reason for Referral Discharge planning   Informant Clinical Staff Member;EMR   Patient [address]   Patient lives with Spouse/Significant other     Chart review for DC planning. Pt admitted on 10/22/2023 from Home for Vertebral Artery Stenosis. PT/OT recommends Home at DC. Pt has ESRD, on HD T Th SA at [facility]    Prior Level of [place]: Pt's wife was present to assist with pt's PLOF. Pt lives at home with his wife. Pt's wife reports that pt uses a SPC to ambulate around the home. Pt does not need assistance with his ADLs. No additional needs at this time.      Noe Matias, JOHNNIE  Discharge [facility]

## 2023-10-27 ENCOUNTER — PATIENT OUTREACH (OUTPATIENT)
Dept: INTERNAL MEDICINE CLINIC | Facility: CLINIC | Age: 78
End: 2023-10-27

## 2023-10-27 NOTE — PROGRESS NOTES
Called patient and spoke with spouse Ligia Bergman. Scheduled patient for a TIA Stroke appt with :    Dr. Sujit Wong  [place] [phone]    Monday 12/4 @11am w/ Dr. Vince Linn    Patient and spouse both verbalized they confirm & understand.       Closing encounter

## 2023-11-03 ENCOUNTER — APPOINTMENT (OUTPATIENT)
Dept: CT IMAGING | Facility: HOSPITAL | Age: 78
End: 2023-11-03
Attending: EMERGENCY MEDICINE
Payer: MEDICARE

## 2023-11-03 ENCOUNTER — APPOINTMENT (OUTPATIENT)
Dept: GENERAL RADIOLOGY | Facility: HOSPITAL | Age: 78
End: 2023-11-03
Attending: EMERGENCY MEDICINE
Payer: MEDICARE

## 2023-11-03 ENCOUNTER — HOSPITAL ENCOUNTER (INPATIENT)
Facility: HOSPITAL | Age: 78
LOS: 4 days | Discharge: HOME | End: 2023-11-07
Attending: EMERGENCY MEDICINE | Admitting: HOSPITALIST
Payer: MEDICARE

## 2023-11-03 DIAGNOSIS — G93.41 METABOLIC ENCEPHALOPATHY: Primary | ICD-10-CM

## 2023-11-03 DIAGNOSIS — D63.8 ANEMIA OF CHRONIC DISEASE: ICD-10-CM

## 2023-11-03 DIAGNOSIS — N18.6 ESRD (END STAGE RENAL DISEASE) (HCC): ICD-10-CM

## 2023-11-03 PROBLEM — R41.82 ALTERED MENTAL STATUS: Status: ACTIVE | Noted: 2023-11-03

## 2023-11-03 PROBLEM — Z99.2 ANEMIA IN CHRONIC KIDNEY DISEASE, ON CHRONIC DIALYSIS (HCC): Status: ACTIVE | Noted: 2023-10-23

## 2023-11-03 PROBLEM — D63.1 ANEMIA IN CHRONIC KIDNEY DISEASE, ON CHRONIC DIALYSIS (HCC): Status: ACTIVE | Noted: 2023-10-23

## 2023-11-03 PROBLEM — I10 PRIMARY HYPERTENSION: Status: ACTIVE | Noted: 2023-11-03

## 2023-11-03 LAB
ALBUMIN SERPL-MCNC: 3.5 G/DL (ref 3.4–5)
ALBUMIN/GLOB SERPL: 0.9 {RATIO} (ref 1–2)
ALP LIVER SERPL-CCNC: 161 U/L
ALT SERPL-CCNC: 19 U/L
AMMONIA PLAS-MCNC: 21 UMOL/L (ref 11–32)
ANION GAP SERPL CALC-SCNC: 7 MMOL/L (ref 0–18)
AST SERPL-CCNC: 28 U/L (ref 15–37)
BASOPHILS # BLD AUTO: 0.01 X10(3) UL (ref 0–0.2)
BASOPHILS NFR BLD AUTO: 0.2 %
BILIRUB SERPL-MCNC: 0.6 MG/DL (ref 0.1–2)
BUN BLD-MCNC: 44 MG/DL (ref 9–23)
CALCIUM BLD-MCNC: 8.9 MG/DL (ref 8.5–10.1)
CHLORIDE SERPL-SCNC: 99 MMOL/L (ref 98–112)
CO2 SERPL-SCNC: 31 MMOL/L (ref 21–32)
CREAT BLD-MCNC: 9.71 MG/DL
EGFRCR SERPLBLD CKD-EPI 2021: 5 ML/MIN/1.73M2 (ref 60–?)
EOSINOPHIL # BLD AUTO: 0.29 X10(3) UL (ref 0–0.7)
EOSINOPHIL NFR BLD AUTO: 6.1 %
ERYTHROCYTE [DISTWIDTH] IN BLOOD BY AUTOMATED COUNT: 14 %
FLUAV + FLUBV RNA SPEC NAA+PROBE: NEGATIVE
FLUAV + FLUBV RNA SPEC NAA+PROBE: NEGATIVE
GLOBULIN PLAS-MCNC: 4.1 G/DL (ref 2.8–4.4)
GLUCOSE BLD-MCNC: 101 MG/DL (ref 70–99)
GLUCOSE BLD-MCNC: 88 MG/DL (ref 70–99)
GLUCOSE BLD-MCNC: 90 MG/DL (ref 70–99)
HCT VFR BLD AUTO: 31 %
HGB BLD-MCNC: 10.9 G/DL
IMM GRANULOCYTES # BLD AUTO: 0.04 X10(3) UL (ref 0–1)
IMM GRANULOCYTES NFR BLD: 0.8 %
LYMPHOCYTES # BLD AUTO: 1.16 X10(3) UL (ref 1–4)
LYMPHOCYTES NFR BLD AUTO: 24.4 %
MAGNESIUM SERPL-MCNC: 2.8 MG/DL (ref 1.6–2.6)
MCH RBC QN AUTO: 30.5 PG (ref 26–34)
MCHC RBC AUTO-ENTMCNC: 35.2 G/DL (ref 31–37)
MCV RBC AUTO: 86.8 FL
MONOCYTES # BLD AUTO: 0.4 X10(3) UL (ref 0.1–1)
MONOCYTES NFR BLD AUTO: 8.4 %
NEUTROPHILS # BLD AUTO: 2.85 X10 (3) UL (ref 1.5–7.7)
NEUTROPHILS # BLD AUTO: 2.85 X10(3) UL (ref 1.5–7.7)
NEUTROPHILS NFR BLD AUTO: 60.1 %
OSMOLALITY SERPL CALC.SUM OF ELEC: 295 MOSM/KG (ref 275–295)
PLATELET # BLD AUTO: 98 10(3)UL (ref 150–450)
POTASSIUM SERPL-SCNC: 4.6 MMOL/L (ref 3.5–5.1)
PROCALCITONIN SERPL-MCNC: 0.25 NG/ML (ref ?–0.16)
PROT SERPL-MCNC: 7.6 G/DL (ref 6.4–8.2)
RBC # BLD AUTO: 3.57 X10(6)UL
RSV RNA SPEC NAA+PROBE: NEGATIVE
SARS-COV-2 RNA RESP QL NAA+PROBE: NOT DETECTED
SODIUM SERPL-SCNC: 137 MMOL/L (ref 136–145)
TACROLIMUS STAT: 2.2 MCG/L
TSI SER-ACNC: 3.28 MIU/ML (ref 0.36–3.74)
WBC # BLD AUTO: 4.8 X10(3) UL (ref 4–11)

## 2023-11-03 PROCEDURE — 70450 CT HEAD/BRAIN W/O DYE: CPT | Performed by: EMERGENCY MEDICINE

## 2023-11-03 PROCEDURE — 71045 X-RAY EXAM CHEST 1 VIEW: CPT | Performed by: EMERGENCY MEDICINE

## 2023-11-03 PROCEDURE — 99222 1ST HOSP IP/OBS MODERATE 55: CPT | Performed by: INTERNAL MEDICINE

## 2023-11-03 RX ORDER — DONEPEZIL HYDROCHLORIDE 10 MG/1
10 TABLET, FILM COATED ORAL NIGHTLY
Status: DISCONTINUED | OUTPATIENT
Start: 2023-11-03 | End: 2023-11-07

## 2023-11-03 RX ORDER — ACETAMINOPHEN 325 MG/1
650 TABLET ORAL EVERY 4 HOURS PRN
Status: DISCONTINUED | OUTPATIENT
Start: 2023-11-03 | End: 2023-11-07

## 2023-11-03 RX ORDER — ALBUMIN (HUMAN) 12.5 G/50ML
100 SOLUTION INTRAVENOUS AS NEEDED
Status: DISCONTINUED | OUTPATIENT
Start: 2023-11-04 | End: 2023-11-06

## 2023-11-03 RX ORDER — ALLOPURINOL 100 MG/1
50 TABLET ORAL DAILY
Status: DISCONTINUED | OUTPATIENT
Start: 2023-11-03 | End: 2023-11-07

## 2023-11-03 RX ORDER — HEPARIN SODIUM 5000 [USP'U]/ML
5000 INJECTION, SOLUTION INTRAVENOUS; SUBCUTANEOUS EVERY 8 HOURS SCHEDULED
Status: DISCONTINUED | OUTPATIENT
Start: 2023-11-03 | End: 2023-11-03

## 2023-11-03 RX ORDER — BISACODYL 10 MG
10 SUPPOSITORY, RECTAL RECTAL
Status: DISCONTINUED | OUTPATIENT
Start: 2023-11-03 | End: 2023-11-07

## 2023-11-03 RX ORDER — PRAVASTATIN SODIUM 20 MG
20 TABLET ORAL NIGHTLY
Status: DISCONTINUED | OUTPATIENT
Start: 2023-11-03 | End: 2023-11-07

## 2023-11-03 RX ORDER — HYDROCODONE BITARTRATE AND ACETAMINOPHEN 5; 325 MG/1; MG/1
2 TABLET ORAL EVERY 4 HOURS PRN
Status: DISCONTINUED | OUTPATIENT
Start: 2023-11-03 | End: 2023-11-06

## 2023-11-03 RX ORDER — MEMANTINE HYDROCHLORIDE 10 MG/1
10 TABLET ORAL 2 TIMES DAILY
Status: DISCONTINUED | OUTPATIENT
Start: 2023-11-03 | End: 2023-11-07

## 2023-11-03 RX ORDER — TACROLIMUS 1 MG/1
1 CAPSULE ORAL 2 TIMES DAILY
Status: DISCONTINUED | OUTPATIENT
Start: 2023-11-03 | End: 2023-11-07

## 2023-11-03 RX ORDER — LIDOCAINE AND PRILOCAINE 25; 25 MG/G; MG/G
CREAM TOPICAL AS NEEDED
Status: DISCONTINUED | OUTPATIENT
Start: 2023-11-04 | End: 2023-11-07

## 2023-11-03 RX ORDER — SENNOSIDES 8.6 MG
17.2 TABLET ORAL NIGHTLY PRN
Status: DISCONTINUED | OUTPATIENT
Start: 2023-11-03 | End: 2023-11-07

## 2023-11-03 RX ORDER — ACETAMINOPHEN 500 MG
500 TABLET ORAL EVERY 4 HOURS PRN
Status: DISCONTINUED | OUTPATIENT
Start: 2023-11-03 | End: 2023-11-07

## 2023-11-03 RX ORDER — ONDANSETRON 2 MG/ML
4 INJECTION INTRAMUSCULAR; INTRAVENOUS EVERY 6 HOURS PRN
Status: DISCONTINUED | OUTPATIENT
Start: 2023-11-03 | End: 2023-11-03

## 2023-11-03 RX ORDER — HYDROXYZINE HYDROCHLORIDE 25 MG/1
25 TABLET, FILM COATED ORAL EVERY 8 HOURS PRN
Status: DISCONTINUED | OUTPATIENT
Start: 2023-11-03 | End: 2023-11-07

## 2023-11-03 RX ORDER — POLYETHYLENE GLYCOL 3350 17 G/17G
17 POWDER, FOR SOLUTION ORAL DAILY PRN
Status: DISCONTINUED | OUTPATIENT
Start: 2023-11-03 | End: 2023-11-07

## 2023-11-03 RX ORDER — TACROLIMUS 0.5 MG/1
0.5 CAPSULE ORAL 2 TIMES DAILY
Status: DISCONTINUED | OUTPATIENT
Start: 2023-11-03 | End: 2023-11-07

## 2023-11-03 RX ORDER — SEVELAMER CARBONATE 800 MG/1
800 TABLET, FILM COATED ORAL
Status: DISCONTINUED | OUTPATIENT
Start: 2023-11-04 | End: 2023-11-07

## 2023-11-03 RX ORDER — ASCORBIC ACID, THIAMINE, RIBOFLAVIN, NIACINAMIDE, PYRIDOXINE, FOLIC ACID, COBALAMIN, BIOTIN, PANTOTHENIC ACID 100; 1.5; 1.7; 20; 10; 1; 6; 300; 1 MG/1; MG/1; MG/1; MG/1; MG/1; MG/1; UG/1; UG/1; MG/1
1 TABLET, COATED ORAL DAILY
Status: DISCONTINUED | OUTPATIENT
Start: 2023-11-03 | End: 2023-11-07

## 2023-11-03 RX ORDER — BUMETANIDE 1 MG/1
2 TABLET ORAL 2 TIMES DAILY
Status: DISCONTINUED | OUTPATIENT
Start: 2023-11-03 | End: 2023-11-07

## 2023-11-03 RX ORDER — GABAPENTIN 100 MG/1
100 CAPSULE ORAL
Status: DISCONTINUED | OUTPATIENT
Start: 2023-11-03 | End: 2023-11-06

## 2023-11-03 RX ORDER — HYDROCODONE BITARTRATE AND ACETAMINOPHEN 5; 325 MG/1; MG/1
1 TABLET ORAL EVERY 4 HOURS PRN
Status: DISCONTINUED | OUTPATIENT
Start: 2023-11-03 | End: 2023-11-06

## 2023-11-03 RX ORDER — METOCLOPRAMIDE HYDROCHLORIDE 5 MG/ML
5 INJECTION INTRAMUSCULAR; INTRAVENOUS EVERY 8 HOURS PRN
Status: DISCONTINUED | OUTPATIENT
Start: 2023-11-03 | End: 2023-11-05

## 2023-11-03 RX ORDER — MELATONIN
3 NIGHTLY PRN
Status: DISCONTINUED | OUTPATIENT
Start: 2023-11-03 | End: 2023-11-07

## 2023-11-03 NOTE — ED INITIAL ASSESSMENT (HPI)
Patient to ER w/ wife. Per wife, patient had episode of slurred speech for one hour today. [id number] am.  Resolved at this time. Did not have his dialysis appt today.      FAST exam negative in triage

## 2023-11-03 NOTE — PROGRESS NOTES
NURSING ADMISSION NOTE      Patient admitted via Cart  Oriented to room. Safety precautions initiated. Bed in low position. Call light in reach. Admission navigator complete. [facility] notified of dialysis.

## 2023-11-04 ENCOUNTER — NURSE ONLY (OUTPATIENT)
Dept: ELECTROPHYSIOLOGY | Facility: HOSPITAL | Age: 78
End: 2023-11-04
Attending: Other
Payer: MEDICARE

## 2023-11-04 PROBLEM — Z99.2 ANEMIA DUE TO CHRONIC KIDNEY DISEASE, ON CHRONIC DIALYSIS (HCC): Status: ACTIVE | Noted: 2023-10-23

## 2023-11-04 PROBLEM — N18.6 ANEMIA DUE TO CHRONIC KIDNEY DISEASE, ON CHRONIC DIALYSIS (HCC): Status: ACTIVE | Noted: 2023-10-23

## 2023-11-04 PROBLEM — D63.1 ANEMIA DUE TO CHRONIC KIDNEY DISEASE, ON CHRONIC DIALYSIS (HCC): Status: ACTIVE | Noted: 2023-10-23

## 2023-11-04 PROBLEM — R41.89 COGNITIVE DECLINE: Status: ACTIVE | Noted: 2021-03-12

## 2023-11-04 LAB
ANION GAP SERPL CALC-SCNC: 10 MMOL/L (ref 0–18)
BASOPHILS # BLD AUTO: 0.02 X10(3) UL (ref 0–0.2)
BASOPHILS NFR BLD AUTO: 0.4 %
BUN BLD-MCNC: 56 MG/DL (ref 9–23)
CALCIUM BLD-MCNC: 8.7 MG/DL (ref 8.5–10.1)
CHLORIDE SERPL-SCNC: 100 MMOL/L (ref 98–112)
CO2 SERPL-SCNC: 28 MMOL/L (ref 21–32)
CREAT BLD-MCNC: 10.8 MG/DL
EGFRCR SERPLBLD CKD-EPI 2021: 4 ML/MIN/1.73M2 (ref 60–?)
EOSINOPHIL # BLD AUTO: 0.3 X10(3) UL (ref 0–0.7)
EOSINOPHIL NFR BLD AUTO: 6.5 %
ERYTHROCYTE [DISTWIDTH] IN BLOOD BY AUTOMATED COUNT: 14 %
GLUCOSE BLD-MCNC: 84 MG/DL (ref 70–99)
HBV SURFACE AG SER-ACNC: <0.1 [IU]/L
HBV SURFACE AG SERPL QL IA: NONREACTIVE
HCT VFR BLD AUTO: 29.2 %
HGB BLD-MCNC: 10.2 G/DL
IMM GRANULOCYTES # BLD AUTO: 0.03 X10(3) UL (ref 0–1)
IMM GRANULOCYTES NFR BLD: 0.6 %
LYMPHOCYTES # BLD AUTO: 1.39 X10(3) UL (ref 1–4)
LYMPHOCYTES NFR BLD AUTO: 30.1 %
MAGNESIUM SERPL-MCNC: 2.6 MG/DL (ref 1.6–2.6)
MCH RBC QN AUTO: 30.4 PG (ref 26–34)
MCHC RBC AUTO-ENTMCNC: 34.9 G/DL (ref 31–37)
MCV RBC AUTO: 86.9 FL
MONOCYTES # BLD AUTO: 0.42 X10(3) UL (ref 0.1–1)
MONOCYTES NFR BLD AUTO: 9.1 %
NEUTROPHILS # BLD AUTO: 2.46 X10 (3) UL (ref 1.5–7.7)
NEUTROPHILS # BLD AUTO: 2.46 X10(3) UL (ref 1.5–7.7)
NEUTROPHILS NFR BLD AUTO: 53.3 %
OSMOLALITY SERPL CALC.SUM OF ELEC: 301 MOSM/KG (ref 275–295)
PLATELET # BLD AUTO: 96 10(3)UL (ref 150–450)
POTASSIUM SERPL-SCNC: 4.3 MMOL/L (ref 3.5–5.1)
RBC # BLD AUTO: 3.36 X10(6)UL
SODIUM SERPL-SCNC: 138 MMOL/L (ref 136–145)
WBC # BLD AUTO: 4.6 X10(3) UL (ref 4–11)

## 2023-11-04 PROCEDURE — 5A1D70Z PERFORMANCE OF URINARY FILTRATION, INTERMITTENT, LESS THAN 6 HOURS PER DAY: ICD-10-PCS | Performed by: INTERNAL MEDICINE

## 2023-11-04 PROCEDURE — 99223 1ST HOSP IP/OBS HIGH 75: CPT | Performed by: OTHER

## 2023-11-04 PROCEDURE — 99233 SBSQ HOSP IP/OBS HIGH 50: CPT | Performed by: INTERNAL MEDICINE

## 2023-11-04 NOTE — PLAN OF CARE
Assumed care at 0700. A&O x 2. On RA tolerating well. Dialysis completed. Dialysis called and ordered for tomorrow. MRI and EEG ordered. Problem: NEUROLOGICAL - ADULT  Goal: Achieves stable or improved neurological status  Description: INTERVENTIONS  - Assess for and report changes in neurological status  - Initiate measures to prevent increased intracranial pressure  - Maintain blood pressure and fluid volume within ordered parameters to optimize cerebral perfusion and minimize risk of hemorrhage  - Monitor temperature, glucose, and sodium. Initiate appropriate interventions as ordered  Outcome: Progressing  Goal: Achieves maximal functionality and self care  Description: INTERVENTIONS  - Monitor swallowing and airway patency with patient fatigue and changes in neurological status  - Encourage and assist patient to increase activity and self care with guidance from PT/OT  - Encourage visually impaired, hearing impaired and aphasic patients to use assistive/communication devices  Outcome: Progressing     Problem: CARDIOVASCULAR - ADULT  Goal: Maintains optimal cardiac output and hemodynamic stability  Description: INTERVENTIONS:  - Monitor vital signs, rhythm, and trends  - Monitor for bleeding, hypotension and signs of decreased cardiac output  - Evaluate effectiveness of vasoactive medications to optimize hemodynamic stability  - Monitor arterial and/or venous puncture sites for bleeding and/or hematoma  - Assess quality of pulses, skin color and temperature  - Assess for signs of decreased coronary artery perfusion - ex.  Angina  - Evaluate fluid balance, assess for edema, trend weights  Outcome: Progressing  Goal: Absence of cardiac arrhythmias or at baseline  Description: INTERVENTIONS:  - Continuous cardiac monitoring, monitor vital signs, obtain 12 lead EKG if indicated  - Evaluate effectiveness of antiarrhythmic and heart rate control medications as ordered  - Initiate emergency measures for life threatening arrhythmias  - Monitor electrolytes and administer replacement therapy as ordered  Outcome: Progressing

## 2023-11-04 NOTE — PLAN OF CARE
Received pt. In bed on room air. Lungs clear. Denied any pain. Alert and oriented to person and place. Speech is clear. ERNST's. Right sided permacath in place for dialysis in am.  IV saline locked. Safety measures in place. Resting comfortably.

## 2023-11-04 NOTE — PHYSICAL THERAPY NOTE
PHYSICAL THERAPY EVALUATION - INPATIENT     Room Number: 2217/1727-N  Evaluation Date: 11/4/2023  Type of Evaluation: Initial  Physician Order: See Comment for Specific Order    Presenting Problem: metabolic encephalopathy  Co-Morbidities : ESRD on HD, HTN, DL, AMANDO, GERD  Reason for Therapy: Mobility Dysfunction and Discharge Planning      ASSESSMENT   Pt is a 70year old male admitted on 11/3/2023: Presented with inc confusion and AMS, CT head (-) dx metabolic encephalopathy. Recent Admit 10/22-10/24/23: vertebral artery stenosis, dc'd home. Pt able to ambulate with SBA and SPC this evaluation. Recommend continued use of SPC. Functional outcome measures completed include AMPAC. The AM-PAC '6-Clicks' Inpatient Basic Mobility Short Form was completed and this patient is demonstrating a Approx Degree of Impairment: 11.2%  degree of impairment in mobility. Research supports that patients with this level of impairment may benefit from dc home. PT Discharge Recommendations: Home      PLAN  Patient has been evaluated and presents with no skilled Physical Therapy needs at this time. Patient discharged from Physical Therapy services. Please re-order if a new functional limitation presents during this admission. GOALS  Patient was able to achieve the following goals . ..     Patient was able to transfer At previous, functional level   Patient able to ambulate on level surfaces At previous, functional level  With [place]  Type of Home: [address]: One level                Lives With: Spouse  Drives: No  Patient Owned Equipment: Cane  Patient Regularly Uses: Glasses    Prior Level of [place]: pt denies any falls in the past 6 months, reports mod I for ambulation and ADLs with SPC but does not always use it    SUBJECTIVE  \" Yeah I'll show you how I walk\"       OBJECTIVE  Precautions: Bed/chair alarm;Limb alert - right  Fall Risk: Standard fall risk    WEIGHT BEARING RESTRICTION  Weight Bearing Restriction: None                PAIN ASSESSMENT  Ratin          COGNITION  Overall Cognitive Status:  WFL - within functional limits    RANGE OF MOTION AND STRENGTH ASSESSMENT  Upper extremity ROM and strength are within functional limits     Lower extremity ROM is within functional limits     Lower extremity strength is within functional limits       BALANCE  Static Sitting: Good  Dynamic Sitting: Good  Static Standing: Fair -  Dynamic Standing: Fair -    ADDITIONAL TESTS                                    ACTIVITY TOLERANCE           BP: 94/72  BP Location: Left arm  BP Method: Automatic  Patient Position: Sitting    O2 WALK       NEUROLOGICAL FINDINGS                        AM-PAC '6-Clicks' INPATIENT SHORT FORM - BASIC MOBILITY  How much difficulty does the patient currently have. .. Patient Difficulty: Turning over in bed (including adjusting bedclothes, sheets and blankets)?: None   Patient Difficulty: Sitting down on and standing up from a chair with arms (e.g., wheelchair, bedside commode, etc.): None   Patient Difficulty: Moving from lying on back to sitting on the side of the bed?: None   How much help from another person does the patient currently need. ..    Help from Another: Moving to and from a bed to a chair (including a wheelchair)?: None   Help from Another: Need to walk in hospital room?: None   Help from Another: Climbing 3-5 steps with a railing?: A Little       AM-PAC Score:  Raw Score: 23   Approx Degree of Impairment: 11.2%   Standardized Score (AM-PAC Scale): 56.93   CMS Modifier (G-Code): CI    FUNCTIONAL ABILITY STATUS  Gait Assessment   Functional Mobility/Gait Assessment  Gait Assistance: Supervision  Distance (ft): 100  Assistive Device: Cane  Pattern: Shuffle    Skilled Therapy Provided     Bed Mobility:  Rolling: indep  Supine to sit: indep   Sit to supine: indep     Transfer Mobility:  Sit to stand: mod I    Stand to sit: mod I   Gait = SBA with [address]    Therapist's comments:pt denied dizziness throughout session, hypotensive post HD, able to tolerate ambulation 100ft with [address]    Exercise/Education Provided:  Functional activity tolerated    Patient End of Session: In bed;Needs met;Call light within reach; All patient questions and concerns addressed;RN aware of session/findings; Alarm set    Patient Evaluation Complexity Level:  History Low - no personal factors and/or co-morbidities   Examination of body systems Low - addressing 1-2 elements   Clinical Presentation Low - Stable   Clinical Decision Making Low Complexity       PT Session Time: 23 minutes  Gait Training: 10 minutes  Therapeutic Activity:  minutes  Neuromuscular Re-education:  minutes  Therapeutic Exercise:  minutes

## 2023-11-05 ENCOUNTER — APPOINTMENT (OUTPATIENT)
Dept: MRI IMAGING | Facility: HOSPITAL | Age: 78
End: 2023-11-05
Attending: INTERNAL MEDICINE
Payer: MEDICARE

## 2023-11-05 PROBLEM — F03.90 DEMENTIA (HCC): Status: ACTIVE | Noted: 2021-03-12

## 2023-11-05 LAB
GLUCOSE BLD-MCNC: 114 MG/DL (ref 70–99)
GLUCOSE BLD-MCNC: 117 MG/DL (ref 70–99)
GLUCOSE BLD-MCNC: 135 MG/DL (ref 70–99)

## 2023-11-05 PROCEDURE — 99291 CRITICAL CARE FIRST HOUR: CPT | Performed by: INTERNAL MEDICINE

## 2023-11-05 PROCEDURE — 99233 SBSQ HOSP IP/OBS HIGH 50: CPT | Performed by: OTHER

## 2023-11-05 PROCEDURE — 70551 MRI BRAIN STEM W/O DYE: CPT | Performed by: INTERNAL MEDICINE

## 2023-11-05 PROCEDURE — 99232 SBSQ HOSP IP/OBS MODERATE 35: CPT | Performed by: INTERNAL MEDICINE

## 2023-11-05 RX ORDER — QUETIAPINE FUMARATE 25 MG/1
25 TABLET, FILM COATED ORAL NIGHTLY PRN
Status: DISCONTINUED | OUTPATIENT
Start: 2023-11-05 | End: 2023-11-06

## 2023-11-05 RX ORDER — OLANZAPINE 5 MG/1
5 TABLET, ORALLY DISINTEGRATING ORAL NIGHTLY PRN
Status: DISCONTINUED | OUTPATIENT
Start: 2023-11-05 | End: 2023-11-05

## 2023-11-05 RX ORDER — QUETIAPINE FUMARATE 25 MG/1
50 TABLET, FILM COATED ORAL NIGHTLY PRN
Status: DISCONTINUED | OUTPATIENT
Start: 2023-11-05 | End: 2023-11-05

## 2023-11-05 NOTE — PROGRESS NOTES
Patient seen for follow-up. Patient had apparently was noted to be quite agitated, aggressive, irritable early this morning. A rapid response was called. .  I discussed the situation with patient's nursing staff in detail at the time of event. No witnessed episode of seizure, tongue biting, jerking movements of the upper or lower extremity, asymmetric weakness, speech difficulty or swallowing difficulty. When I saw the patient this afternoon patient did not have any recollection of the spell. Examination:  Awake, alert, oriented to place. Able to follow one-step command, has occasional difficulty in following 2 cassette commands. no dysarthria or aphasia. Pupils round and reactive to light, no facial weakness, tongue midline. Reasonable strength in upper and lower extremities. Investigations:  MRI of the brain: No evidence of acute intracranial abnormality such as stroke. EEG: Mild diffuse slowing noted no epileptiform activity noted. Impression/medical decision making:    Encephalopathy complicated by underlying dementia, contributed by infection and uremia as well as electrolyte imbalance. Evidence of seizure noted. No evidence of stroke noted. Dementia with behavioral disturbance. Patient did have an episode of agitation earlier this morning also. Patient on  respirdol. Consider psychiatry consult if okay with patient's primary team.  Patient currently on Aricept, memantine and respirdol. Stroke prophylaxis. Patient on statin but not on aspirin due to low platelet count. Patient's wife counseled in detail. All questions answered. No further neurological intervention needed at this time.

## 2023-11-05 NOTE — PLAN OF CARE
Confirmed with Nephrology that pt does not need HD again on Sunday. 1L taken off on 11/4 Sat  MRI brain ordered and needs to be completed  AMS seems to be a little improved  Pt A/O x 2-3. Impulsive at times  R arm precautions  DNR select  Up 1 with cane, OT to see  Bed alarm on  Will cont to monitor    Problem: NEUROLOGICAL - ADULT  Goal: Achieves stable or improved neurological status  Description: INTERVENTIONS  - Assess for and report changes in neurological status  - Initiate measures to prevent increased intracranial pressure  - Maintain blood pressure and fluid volume within ordered parameters to optimize cerebral perfusion and minimize risk of hemorrhage  - Monitor temperature, glucose, and sodium. Initiate appropriate interventions as ordered  Outcome: Not Progressing  Goal: Achieves maximal functionality and self care  Description: INTERVENTIONS  - Monitor swallowing and airway patency with patient fatigue and changes in neurological status  - Encourage and assist patient to increase activity and self care with guidance from PT/OT  - Encourage visually impaired, hearing impaired and aphasic patients to use assistive/communication devices  Outcome: Not Progressing     Problem: CARDIOVASCULAR - ADULT  Goal: Maintains optimal cardiac output and hemodynamic stability  Description: INTERVENTIONS:  - Monitor vital signs, rhythm, and trends  - Monitor for bleeding, hypotension and signs of decreased cardiac output  - Evaluate effectiveness of vasoactive medications to optimize hemodynamic stability  - Monitor arterial and/or venous puncture sites for bleeding and/or hematoma  - Assess quality of pulses, skin color and temperature  - Assess for signs of decreased coronary artery perfusion - ex.  Angina  - Evaluate fluid balance, assess for edema, trend weights  Outcome: Not Progressing  Goal: Absence of cardiac arrhythmias or at baseline  Description: INTERVENTIONS:  - Continuous cardiac monitoring, monitor vital signs, obtain 12 lead EKG if indicated  - Evaluate effectiveness of antiarrhythmic and heart rate control medications as ordered  - Initiate emergency measures for life threatening arrhythmias  - Monitor electrolytes and administer replacement therapy as ordered  Outcome: Not Progressing

## 2023-11-05 NOTE — PLAN OF CARE
Assumed pt care at 0730. A&Ox2, forgetful & short-term memory loss noted (baseline per pt's wife). VSS. Room air. Posey vest in place. Neuro checks q4h. R AVF present. L AC PIV SL. Regular diet. Denies pain, denies N/V. Anuric, hx of ESRD. Wife at bedside, updated on POC. Problem: NEUROLOGICAL - ADULT  Goal: Achieves stable or improved neurological status  Description: INTERVENTIONS  - Assess for and report changes in neurological status  - Initiate measures to prevent increased intracranial pressure  - Maintain blood pressure and fluid volume within ordered parameters to optimize cerebral perfusion and minimize risk of hemorrhage  - Monitor temperature, glucose, and sodium. Initiate appropriate interventions as ordered  Outcome: Progressing  Goal: Achieves maximal functionality and self care  Description: INTERVENTIONS  - Monitor swallowing and airway patency with patient fatigue and changes in neurological status  - Encourage and assist patient to increase activity and self care with guidance from PT/OT  - Encourage visually impaired, hearing impaired and aphasic patients to use assistive/communication devices  Outcome: Progressing     Problem: CARDIOVASCULAR - ADULT  Goal: Maintains optimal cardiac output and hemodynamic stability  Description: INTERVENTIONS:  - Monitor vital signs, rhythm, and trends  - Monitor for bleeding, hypotension and signs of decreased cardiac output  - Evaluate effectiveness of vasoactive medications to optimize hemodynamic stability  - Monitor arterial and/or venous puncture sites for bleeding and/or hematoma  - Assess quality of pulses, skin color and temperature  - Assess for signs of decreased coronary artery perfusion - ex.  Angina  - Evaluate fluid balance, assess for edema, trend weights  Outcome: Progressing  Goal: Absence of cardiac arrhythmias or at baseline  Description: INTERVENTIONS:  - Continuous cardiac monitoring, monitor vital signs, obtain 12 lead EKG if indicated  - Evaluate effectiveness of antiarrhythmic and heart rate control medications as ordered  - Initiate emergency measures for life threatening arrhythmias  - Monitor electrolytes and administer replacement therapy as ordered  Outcome: Progressing     Problem: Delirium  Goal: Minimize duration of delirium  Description: Interventions:  - Encourage use of hearing aids, eye glasses  - Promote highest level of mobility daily  - Provide frequent reorientation  - Promote wakefulness i.e. lights on, blinds open  - Promote sleep, encourage patient's normal rest cycle i.e. lights off, TV off, minimize noise and interruptions  - Encourage family to assist in orientation and promotion of home routines  Outcome: Progressing     Problem: Safety Risk - Non-Violent Restraints  Goal: Patient will remain free from self-harm  Description: INTERVENTIONS:  - Apply the least restrictive restraint to prevent harm  - Notify patient and family of reasons restraints applied  - Assess for any contributing factors to confusion (electrolyte disturbances, delirium, medications)  - Discontinue any unnecessary medical devices as soon as possible  - Assess the patient's physical comfort, circulation, skin condition, hydration, nutrition and elimination needs   - Reorient and redirection as needed  - Assess for the need to continue restraints  Outcome: Progressing

## 2023-11-05 NOTE — PROGRESS NOTES
RRT called as pts mental status is way below his normal baseline, pt is agitated, oriented x 1-2. Pt arguing with staff, wanting to leave, not able to provide  Possible stroke? MRI changed to STAT  Pt having expressive aphasia, dysarthria, unsure about his birthday, and what month it is, NIH 4  Was overwhelmed by staff response  VSS  Room air  HD fistula R arm  Neuro notified  Started on stroke protocol neuro checks and vitals  Possible EEG Sunday?   Staff at bedside, escorted to MRI with transport  Will continue to monitor

## 2023-11-05 NOTE — SLP NOTE
ADULT SWALLOWING EVALUATION    ASSESSMENT    ASSESSMENT/OVERALL IMPRESSION:  Patient seen for bedside clinical swallow evaluation per physician order. Patient is a 69 yo male with a PMH significant for  ESRD on HD, HTN, DL, AMANDO, GERD, gout, CHF, heart transplant in 2006 on immunosuppression, BPH, mild cognitive impairement/dementia presents to the ED with wife due to increasing confusion and altered mental status. Per patient's wife patient has been getting more confused over the last few weeks. Last night, patient increasingly confused and agitated threatening to harm staff. Rn also noted some expressive aphasia and dysarthria. Rapid response called. MRI negative for acute process. RN reports patient has been tolerating PO intake without difficulty. Patient found awake in bed oriented to person and grossly to place. Patient pleasant and cooperative. Able to follow simple commands for participation in evaluation. Patient with intact and symmetrical oral and facial structures and functional oral-motor function for trials of PO. Speech noted to be clear without dysarthria. Patient with strong, clear voice. Patient provided trials of thin via cup and straw, both single and successive sips, puree via spoon and bites of cracker. Patient able to self feed without difficulty. Appropriate oral retrieval and containment, oral phase of swallow. Hyolaryngeal elevation appreciated with palpation. No overt clinical s/s of aspiration with provided PO trials. Clear vocal quality remained throughout evaluation. Results and recs discussed with patient and RN. RECOMMENDATIONS   Diet Recommendations - Solids: Regular  Diet Recommendations - Liquids: Thin Liquids                        Compensatory Strategies Recommended: Slow rate;Small bites and sips  Aspiration Precautions: Upright position; Slow rate;Small bites and sips  Medication Administration Recommendations: One pill at a time     Discharge Recommendations/Plan: Undetermined    HISTORY   MEDICAL HISTORY  Reason for Referral: Stroke protocol;R/O aspiration    Problem List  Principal Problem:    Metabolic encephalopathy  Active Problems:    Cognitive decline    ESRD (end stage renal disease) (HCC)    Altered mental status    Primary hypertension    Anemia of chronic disease      Past Medical History  Past Medical History:   Diagnosis Date    Asthma     BPH (benign prostatic hyperplasia)     CKD (chronic kidney disease)     Congestive heart disease ([facility] [address]     Dialysis patient ([facility] [address] 07/27/2023    HEMODIALYSIS TUES/THURS/SAT 3.5HRS    Esophageal reflux     Fistula     RIGHT ARM    Glaucoma     Gout     Hearing impairment     HA's x2    Heart transplanted [facility]) 2006 2006-@ [facility]    High blood pressure     High cholesterol     Hypercholesterolemia     Renal disorder     CKD    Short-term memory loss     Sleep apnea     no tx    Visual impairment     glasses       Prior Living Situation: Home with spouse  Diet Prior to Admission: Regular; Thin liquids  Precautions: Aspiration    Patient/Family Goals: No self stated goal    SWALLOWING HISTORY  Current Diet Consistency: Regular; Thin liquids  Dysphagia History: None  Imaging Results:     SUBJECTIVE   Patient forgetful. Asking for breakfast tray and RN confirmed patient already had morning meal. Following completion of evaluation, SLP called down to kitchen and asked for yogurt to be brought to patient per his request.    OBJECTIVE   ORAL MOTOR EXAMINATION  Dentition: Natural;Functional  Symmetry: Within Functional Limits  Strength: Within Functional Limits  Tone: Within Functional Limits  Range of Motion: Within Functional Limits  Rate of Motion: Within Functional Limits    Voice Quality: Clear  Respiratory Status: Unlabored  Consistencies Trialed: Thin liquids;Puree;Hard solid  Method of Presentation: Self presentation;Spoon;Cup;Straw;Single sips; Consecutive swallows  Patient Positioning: Upright;Midline    Oral Phase of Swallow: Within Functional Limits                      Pharyngeal Phase of Swallow: Within Functional Limits           (Please note: Silent aspiration cannot be evaluated clinically. Videofluoroscopic Swallow Study is required to rule-out silent aspiration.)    Esophageal Phase of Swallow: No complaints consistent with possible esophageal involvement  Comments: None              GOALS  Goal #1 The patient will tolerate regular consistency and thin liquids without overt signs or symptoms of aspiration with 100 % accuracy over 1 session(s). In Progress   Goal #2 The patient/family/caregiver will demonstrate understanding and implementation of aspiration precautions and swallow strategies independently over 1 session(s).     In Progress   Goal #3     Goal #4     Goal #5     Goal #6     Goal #7     Goal #8       FOLLOW UP     Number of Visits to Meet Established Goals: 1  Follow Up Needed (Documentation Required): Yes  SLP Follow-up Date: 11/06/23    Thank you for your referral.   If you have any questions, please contact MEAGAN Cronin

## 2023-11-05 NOTE — PROGRESS NOTES
Pt having episodes of confusion overnight attempting to get out of bed but not being able to provide a reason why  Bed alarm on  Will continue to monitor

## 2023-11-06 ENCOUNTER — APPOINTMENT (OUTPATIENT)
Dept: GENERAL RADIOLOGY | Facility: HOSPITAL | Age: 78
End: 2023-11-06
Attending: RADIOLOGY
Payer: MEDICARE

## 2023-11-06 ENCOUNTER — APPOINTMENT (OUTPATIENT)
Dept: INTERVENTIONAL RADIOLOGY/VASCULAR | Facility: HOSPITAL | Age: 78
End: 2023-11-06
Attending: INTERNAL MEDICINE
Payer: MEDICARE

## 2023-11-06 PROBLEM — D63.1 ANEMIA IN ESRD (END-STAGE RENAL DISEASE): Status: ACTIVE | Noted: 2023-11-03

## 2023-11-06 PROBLEM — N18.6 ANEMIA IN ESRD (END-STAGE RENAL DISEASE)  (HCC): Status: ACTIVE | Noted: 2023-11-03

## 2023-11-06 PROBLEM — D63.1 ANEMIA IN ESRD (END-STAGE RENAL DISEASE) (HCC): Status: ACTIVE | Noted: 2023-11-03

## 2023-11-06 PROBLEM — N18.6 ANEMIA IN ESRD (END-STAGE RENAL DISEASE): Status: ACTIVE | Noted: 2023-11-03

## 2023-11-06 PROBLEM — N18.6 ANEMIA IN ESRD (END-STAGE RENAL DISEASE) (HCC): Status: ACTIVE | Noted: 2023-11-03

## 2023-11-06 PROBLEM — D63.1 ANEMIA IN ESRD (END-STAGE RENAL DISEASE)  (HCC): Status: ACTIVE | Noted: 2023-11-03

## 2023-11-06 PROCEDURE — 0JPT0XZ REMOVAL OF TUNNELED VASCULAR ACCESS DEVICE FROM TRUNK SUBCUTANEOUS TISSUE AND FASCIA, OPEN APPROACH: ICD-10-PCS | Performed by: RADIOLOGY

## 2023-11-06 PROCEDURE — 02PY33Z REMOVAL OF INFUSION DEVICE FROM GREAT VESSEL, PERCUTANEOUS APPROACH: ICD-10-PCS | Performed by: RADIOLOGY

## 2023-11-06 PROCEDURE — 99232 SBSQ HOSP IP/OBS MODERATE 35: CPT | Performed by: INTERNAL MEDICINE

## 2023-11-06 PROCEDURE — 71045 X-RAY EXAM CHEST 1 VIEW: CPT | Performed by: RADIOLOGY

## 2023-11-06 PROCEDURE — 90792 PSYCH DIAG EVAL W/MED SRVCS: CPT | Performed by: OTHER

## 2023-11-06 RX ORDER — GABAPENTIN 100 MG/1
100 CAPSULE ORAL DAILY PRN
Status: DISCONTINUED | OUTPATIENT
Start: 2023-11-06 | End: 2023-11-07

## 2023-11-06 RX ORDER — RISPERIDONE 0.25 MG/1
0.75 TABLET ORAL NIGHTLY
Status: DISCONTINUED | OUTPATIENT
Start: 2023-11-06 | End: 2023-11-07

## 2023-11-06 RX ORDER — LIDOCAINE HYDROCHLORIDE 10 MG/ML
INJECTION, SOLUTION INFILTRATION; PERINEURAL
Status: COMPLETED
Start: 2023-11-06 | End: 2023-11-06

## 2023-11-06 RX ORDER — ALBUMIN (HUMAN) 12.5 G/50ML
100 SOLUTION INTRAVENOUS AS NEEDED
Status: DISCONTINUED | OUTPATIENT
Start: 2023-11-06 | End: 2023-11-07

## 2023-11-06 NOTE — SLP NOTE
SPEECH DAILY NOTE - INPATIENT    ASSESSMENT & PLAN   ASSESSMENT  Pt seen for dysphagia tx to assess tolerance with recommended diet, ensure appropriate utilization of aspiration precautions and provide pt/family education. Pt found lying semi-upright in bed with spouse present in room. Both reported good tolerance with recommended diet and denied any overt s/s of aspiration with meals. Pt positioned upright to 90 degrees in bed and observed taking sips of thin liquids via straw. Delayed cough following which did not appear now was reported as related. Reviewed and reiterated importance of aspiration precautions with good understanding reported by all. No further services warranted. Will d/c from services at this time. Diet Recommendations - Solids: Regular  Diet Recommendations - Liquids: Thin Liquids    Compensatory Strategies Recommended: Slow rate;Small bites and sips  Aspiration Precautions: Upright position; Slow rate;Small bites and sips  Medication Administration Recommendations: One pill at a time    Patient Experiencing Pain: No                Discharge Recommendations  Discharge Recommendations/Plan: 24 hour care/supervision;Home    Treatment Plan       Interdisciplinary Communication:  spouse             GOALS  Goal #1 The patient will tolerate regular consistency and thin liquids without overt signs or symptoms of aspiration with 100 % accuracy over 1 session(s). Met   Goal #2 The patient/family/caregiver will demonstrate understanding and implementation of aspiration precautions and swallow strategies independently over 1 session(s).     Met         FOLLOW UP  Follow Up Needed (Documentation Required): No  SLP Follow-up Date: 11/06/23  Number of Visits to Meet Established Goals: 1    Session: 1    If you have any questions, please contact MEAGAN Davis [place], [id number] [facility]  Pager

## 2023-11-06 NOTE — PLAN OF CARE
Assumed care at [address]  A/Ox1, on room air, no telemetry   Very forgetful, needing frequent reorientation   Regular diet  Ambulates with SBA and cane  PRN seroquel given per mar   Neuro checks Q4  AV fistula to right arm  Permacath to right side, partially pulled out by patient, MD notified, no further orders, covered area   No complaints of pain   Patient updated with plan of care  All needs met at this time     Problem: NEUROLOGICAL - ADULT  Goal: Achieves stable or improved neurological status  Description: INTERVENTIONS  - Assess for and report changes in neurological status  - Initiate measures to prevent increased intracranial pressure  - Maintain blood pressure and fluid volume within ordered parameters to optimize cerebral perfusion and minimize risk of hemorrhage  - Monitor temperature, glucose, and sodium. Initiate appropriate interventions as ordered  Outcome: Progressing  Goal: Achieves maximal functionality and self care  Description: INTERVENTIONS  - Monitor swallowing and airway patency with patient fatigue and changes in neurological status  - Encourage and assist patient to increase activity and self care with guidance from PT/OT  - Encourage visually impaired, hearing impaired and aphasic patients to use assistive/communication devices  Outcome: Progressing     Problem: CARDIOVASCULAR - ADULT  Goal: Maintains optimal cardiac output and hemodynamic stability  Description: INTERVENTIONS:  - Monitor vital signs, rhythm, and trends  - Monitor for bleeding, hypotension and signs of decreased cardiac output  - Evaluate effectiveness of vasoactive medications to optimize hemodynamic stability  - Monitor arterial and/or venous puncture sites for bleeding and/or hematoma  - Assess quality of pulses, skin color and temperature  - Assess for signs of decreased coronary artery perfusion - ex.  Angina  - Evaluate fluid balance, assess for edema, trend weights  Outcome: Progressing  Goal: Absence of cardiac arrhythmias or at baseline  Description: INTERVENTIONS:  - Continuous cardiac monitoring, monitor vital signs, obtain 12 lead EKG if indicated  - Evaluate effectiveness of antiarrhythmic and heart rate control medications as ordered  - Initiate emergency measures for life threatening arrhythmias  - Monitor electrolytes and administer replacement therapy as ordered  Outcome: Progressing     Problem: Delirium  Goal: Minimize duration of delirium  Description: Interventions:  - Encourage use of hearing aids, eye glasses  - Promote highest level of mobility daily  - Provide frequent reorientation  - Promote wakefulness i.e. lights on, blinds open  - Promote sleep, encourage patient's normal rest cycle i.e. lights off, TV off, minimize noise and interruptions  - Encourage family to assist in orientation and promotion of home routines  Outcome: Progressing     Problem: Safety Risk - Non-Violent Restraints  Goal: Patient will remain free from self-harm  Description: INTERVENTIONS:  - Apply the least restrictive restraint to prevent harm  - Notify patient and family of reasons restraints applied  - Assess for any contributing factors to confusion (electrolyte disturbances, delirium, medications)  - Discontinue any unnecessary medical devices as soon as possible  - Assess the patient's physical comfort, circulation, skin condition, hydration, nutrition and elimination needs   - Reorient and redirection as needed  - Assess for the need to continue restraints  Outcome: Progressing

## 2023-11-07 VITALS
SYSTOLIC BLOOD PRESSURE: 111 MMHG | HEIGHT: 71 IN | BODY MASS INDEX: 25.2 KG/M2 | TEMPERATURE: 98 F | HEART RATE: 80 BPM | RESPIRATION RATE: 20 BRPM | OXYGEN SATURATION: 99 % | WEIGHT: 180 LBS | DIASTOLIC BLOOD PRESSURE: 72 MMHG

## 2023-11-07 LAB — GLUCOSE BLD-MCNC: 98 MG/DL (ref 70–99)

## 2023-11-07 PROCEDURE — 90935 HEMODIALYSIS ONE EVALUATION: CPT | Performed by: INTERNAL MEDICINE

## 2023-11-07 PROCEDURE — 99232 SBSQ HOSP IP/OBS MODERATE 35: CPT | Performed by: OTHER

## 2023-11-07 RX ORDER — RISPERIDONE 0.5 MG/1
TABLET ORAL
Qty: 60 TABLET | Refills: 0 | Status: SHIPPED | OUTPATIENT
Start: 2023-11-07

## 2023-11-07 NOTE — PLAN OF CARE
Assumed care at [address]  A/Ox1, forgetful, on room air,  no telemetry   Regular diet  Ambulates with SBA and cane  Neuro checks Q4  AV fistula to right arm   No IV, MD aware  No complaints of pain   Patient updated with plan of care  All needs met at this time    Attempting to get out of bed frequently throughout noc shift, often redirectable. Slept approx. 3 hours. Problem: NEUROLOGICAL - ADULT  Goal: Achieves stable or improved neurological status  Description: INTERVENTIONS  - Assess for and report changes in neurological status  - Initiate measures to prevent increased intracranial pressure  - Maintain blood pressure and fluid volume within ordered parameters to optimize cerebral perfusion and minimize risk of hemorrhage  - Monitor temperature, glucose, and sodium. Initiate appropriate interventions as ordered  Outcome: Progressing  Goal: Achieves maximal functionality and self care  Description: INTERVENTIONS  - Monitor swallowing and airway patency with patient fatigue and changes in neurological status  - Encourage and assist patient to increase activity and self care with guidance from PT/OT  - Encourage visually impaired, hearing impaired and aphasic patients to use assistive/communication devices  Outcome: Progressing     Problem: CARDIOVASCULAR - ADULT  Goal: Maintains optimal cardiac output and hemodynamic stability  Description: INTERVENTIONS:  - Monitor vital signs, rhythm, and trends  - Monitor for bleeding, hypotension and signs of decreased cardiac output  - Evaluate effectiveness of vasoactive medications to optimize hemodynamic stability  - Monitor arterial and/or venous puncture sites for bleeding and/or hematoma  - Assess quality of pulses, skin color and temperature  - Assess for signs of decreased coronary artery perfusion - ex.  Angina  - Evaluate fluid balance, assess for edema, trend weights  Outcome: Progressing  Goal: Absence of cardiac arrhythmias or at baseline  Description: INTERVENTIONS:  - Continuous cardiac monitoring, monitor vital signs, obtain 12 lead EKG if indicated  - Evaluate effectiveness of antiarrhythmic and heart rate control medications as ordered  - Initiate emergency measures for life threatening arrhythmias  - Monitor electrolytes and administer replacement therapy as ordered  Outcome: Progressing     Problem: Delirium  Goal: Minimize duration of delirium  Description: Interventions:  - Encourage use of hearing aids, eye glasses  - Promote highest level of mobility daily  - Provide frequent reorientation  - Promote wakefulness i.e. lights on, blinds open  - Promote sleep, encourage patient's normal rest cycle i.e. lights off, TV off, minimize noise and interruptions  - Encourage family to assist in orientation and promotion of home routines  Outcome: Progressing     Problem: Safety Risk - Non-Violent Restraints  Goal: Patient will remain free from self-harm  Description: INTERVENTIONS:  - Apply the least restrictive restraint to prevent harm  - Notify patient and family of reasons restraints applied  - Assess for any contributing factors to confusion (electrolyte disturbances, delirium, medications)  - Discontinue any unnecessary medical devices as soon as possible  - Assess the patient's physical comfort, circulation, skin condition, hydration, nutrition and elimination needs   - Reorient and redirection as needed  - Assess for the need to continue restraints  Outcome: Progressing

## 2023-11-07 NOTE — PLAN OF CARE
NURSING DISCHARGE NOTE    Discharged Home via Wheelchair. Accompanied by Spouse and Support staff  Belongings Taken by patient/family. Discharge order in place. Pt cleared by hospitalist for discharge. Discharged information provided to patient, verbalized understanding of the discharge plans. IV removed, all questions answered. Pt left unit in stable condition.

## 2023-11-07 NOTE — PLAN OF CARE
Assumed care at 0730  A/Ox1, on room air, no telemetry   Very forgetful, needing frequent reorientation   Regular diet  Ambulates with SBA and cane  Neuro checks Q4  AV fistula to right arm  Permacath removed by  IR. No complaints of pain   Patient updated with plan of care  All needs met at this time         Problem: NEUROLOGICAL - ADULT  Goal: Achieves stable or improved neurological status  Description: INTERVENTIONS  - Assess for and report changes in neurological status  - Initiate measures to prevent increased intracranial pressure  - Maintain blood pressure and fluid volume within ordered parameters to optimize cerebral perfusion and minimize risk of hemorrhage  - Monitor temperature, glucose, and sodium.  Initiate appropriate interventions as ordered  Outcome: Progressing  Goal: Achieves maximal functionality and self care  Description: INTERVENTIONS  - Monitor swallowing and airway patency with patient fatigue and changes in neurological status  - Encourage and assist patient to increase activity and self care with guidance from PT/OT  - Encourage visually impaired, hearing impaired and aphasic patients to use assistive/communication devices  Outcome: Progressing

## 2023-11-13 NOTE — PAYOR COMM NOTE
Discharge Notification    Patient Name: Myriam Levine  Payor: [address] [place] [address] #: [id number]  Authorization Number: [id number]  Admit Date/Time: 11/3/2023 1:02 PM  Discharge Date/Time: 11/7/2023 3:27 PM

## 2023-12-01 ENCOUNTER — ANESTHESIA EVENT (OUTPATIENT)
Dept: ENDOSCOPY | Facility: HOSPITAL | Age: 78
End: 2023-12-01
Payer: MEDICARE

## 2023-12-01 ENCOUNTER — HOSPITAL ENCOUNTER (OUTPATIENT)
Facility: HOSPITAL | Age: 78
Setting detail: HOSPITAL OUTPATIENT SURGERY
Discharge: HOME | End: 2023-12-01
Attending: INTERNAL MEDICINE | Admitting: INTERNAL MEDICINE
Payer: MEDICARE

## 2023-12-01 ENCOUNTER — ANESTHESIA (OUTPATIENT)
Dept: ENDOSCOPY | Facility: HOSPITAL | Age: 78
End: 2023-12-01
Payer: MEDICARE

## 2023-12-01 VITALS
OXYGEN SATURATION: 98 % | HEIGHT: 71 IN | DIASTOLIC BLOOD PRESSURE: 79 MMHG | SYSTOLIC BLOOD PRESSURE: 121 MMHG | TEMPERATURE: 98 F | HEART RATE: 82 BPM | BODY MASS INDEX: 25.2 KG/M2 | RESPIRATION RATE: 18 BRPM | WEIGHT: 180 LBS

## 2023-12-01 LAB
ANION GAP SERPL CALC-SCNC: 9 MMOL/L (ref 0–18)
BUN BLD-MCNC: 34 MG/DL (ref 9–23)
CALCIUM BLD-MCNC: 9.1 MG/DL (ref 8.5–10.1)
CHLORIDE SERPL-SCNC: 100 MMOL/L (ref 98–112)
CO2 SERPL-SCNC: 27 MMOL/L (ref 21–32)
CREAT BLD-MCNC: 8.18 MG/DL
EGFRCR SERPLBLD CKD-EPI 2021: 6 ML/MIN/1.73M2 (ref 60–?)
GLUCOSE BLD-MCNC: 109 MG/DL (ref 70–99)
OSMOLALITY SERPL CALC.SUM OF ELEC: 290 MOSM/KG (ref 275–295)
POTASSIUM SERPL-SCNC: 5.1 MMOL/L (ref 3.5–5.1)
SODIUM SERPL-SCNC: 136 MMOL/L (ref 136–145)

## 2023-12-01 PROCEDURE — 88305 TISSUE EXAM BY PATHOLOGIST: CPT | Performed by: INTERNAL MEDICINE

## 2023-12-01 PROCEDURE — 0DB58ZX EXCISION OF ESOPHAGUS, VIA NATURAL OR ARTIFICIAL OPENING ENDOSCOPIC, DIAGNOSTIC: ICD-10-PCS | Performed by: INTERNAL MEDICINE

## 2023-12-01 PROCEDURE — 80048 BASIC METABOLIC PNL TOTAL CA: CPT | Performed by: INTERNAL MEDICINE

## 2023-12-01 RX ORDER — NALOXONE HYDROCHLORIDE 0.4 MG/ML
0.08 INJECTION, SOLUTION INTRAMUSCULAR; INTRAVENOUS; SUBCUTANEOUS ONCE AS NEEDED
Status: DISCONTINUED | OUTPATIENT
Start: 2023-12-01 | End: 2023-12-01

## 2023-12-01 RX ORDER — SODIUM CHLORIDE 9 MG/ML
INJECTION, SOLUTION INTRAVENOUS CONTINUOUS
Status: DISCONTINUED | OUTPATIENT
Start: 2023-12-01 | End: 2023-12-01

## 2023-12-01 RX ORDER — ONDANSETRON 2 MG/ML
4 INJECTION INTRAMUSCULAR; INTRAVENOUS ONCE AS NEEDED
Status: DISCONTINUED | OUTPATIENT
Start: 2023-12-01 | End: 2023-12-01

## 2023-12-01 RX ORDER — SODIUM CHLORIDE, SODIUM LACTATE, POTASSIUM CHLORIDE, CALCIUM CHLORIDE 600; 310; 30; 20 MG/100ML; MG/100ML; MG/100ML; MG/100ML
INJECTION, SOLUTION INTRAVENOUS CONTINUOUS
Status: DISCONTINUED | OUTPATIENT
Start: 2023-12-01 | End: 2023-12-01

## 2023-12-01 RX ADMIN — SODIUM CHLORIDE: 9 INJECTION, SOLUTION INTRAVENOUS at 09:05:00

## 2023-12-01 NOTE — DISCHARGE INSTRUCTIONS
FINDINGS:  1. Your esophagus is \"S\" shaped. There are no narrowings to stretch open. 2.  I took routine biopsies of your esophagus. PLAN:  1. Await the biopsy results. 2.  Chew your food very carefully and drink plenty of liquid with each bite of food. If you have any questions, please call us at [phone]. Thank you,  Renzo Lord MD      Home Care Instructions for Gastroscopy with Sedation    Diet:  - Resume your regular diet as tolerated unless otherwise instructed. - Start with light meals to minimize bloating.  - Do not drink alcohol today. Medication:  - If you have questions about resuming your normal medications, please contact your Primary Care Physician. Activities:  - Take it easy today. Do not return to work today. - Do not drive today. - Do not operate any machinery today (including kitchen equipment). Gastroscopy:  - You may have a sore throat for 2-3 days following the exam. This is normal. Gargling with warm salt water (1/2 tsp salt to 1 glass warm water) or using throat lozenges will help. - If you experience any sharp pain in your neck, abdomen or chest, vomiting of blood, oral temperature over 100 degrees Fahrenheit, light-headedness or dizziness, or any other problems, contact your doctor. **If unable to reach your doctor, please go to the [facility] Emergency Room**    - Your referring physician will receive a full report of your examination.  - If you do not hear from your doctor's office within two weeks of your biopsy, please call them for your results.

## 2023-12-01 NOTE — H&P
History and Physical for Endoscopic Procedure      Roger Espinosa Patient Status:  Hospital Outpatient Surgery    8/10/1945 MRN [id number]   Location [address] [facility] 28 Attending Jean Segovia MD   Hosp Day # 0 PCP Albert Johnston MD     Date of Consult:  23    Reason for Consultation:  dysphagia    History of Present Illness:  Roger Espinosa is a a(n) 70year old male. History:  Past Medical History:   Diagnosis Date    Back problem     BPH (benign prostatic hyperplasia)     Cataract     CKD (chronic kidney disease)     Congestive heart disease ([address]     before transplant in     Dialysis patient ([facility] [address] 2023    HEMODIALYSIS TUES/THURS/SAT 3.5HRS    Esophageal reflux     Fistula     RIGHT ARM    Glaucoma     Gout     Hearing impairment     HA's x2    Heart transplanted ([facility] [address] 2006-@ [facility]    High blood pressure     High cholesterol     Hypercholesterolemia     Muscle weakness     Problems with swallowing     Renal disorder     CKD    Short-term memory loss     Shortness of breath     Visual impairment     glasses     Past Surgical History:   Procedure Laterality Date    ARTHROSCOPY OF JOINT UNLISTED Right     rotator cuff repair    AV FISTULA REVISION, OPEN      incorrect info per wife    COLONOSCOPY      HEART TRANSPLANT      [facility]iv [facility]     KNEE REPLACEMENT SURGERY Left 2018    x2    OTHER SURGICAL HISTORY  2021    Cysto Dr. Edenilson Voss Left     2 on this leg    UPPER GI ENDOSCOPY,EXAM       Family History   Problem Relation Age of Onset    Heart Disorder Father         heart attack    Heart Disorder Mother         CHF      reports that he quit smoking about 13 years ago. His smoking use included cigarettes. He has never used smokeless tobacco. He reports current alcohol use. He reports that he does not currently use drugs. Allergies:   Allergies   Allergen Reactions    Dalteparin HIVES and RASH     Fragmin      Lisinopril OTHER (SEE COMMENTS)     Hyperkalemia     Penicillins HIVES, RASH, SWELLING and SHORTNESS OF BREATH    Oxycodone OTHER (SEE COMMENTS)     Lost his taste and wt loss       Medications:    Current Facility-Administered Medications:     sodium chloride 0.9% infusion, , Intravenous, Continuous    Review of Systems:  Gastrointestinal: negative other than specified in the HPI  General: negative other than specified in the HPI  Neurological: negative other than specified in the HPI  Cardiovascular: negative other than specified in the HPI  Respiratory: negative other than specified in the HPI    Physical Exam:  No acute distress  RRR  CTA B/L  SOFT +BS    Assessment/Plan:  Patient Active Problem List   Diagnosis    Glaucoma    CKD (chronic kidney disease) stage 4, GFR 15-29 ml/min ([facility])    Right hip pain    Thrombocytopenia ([facility])    Chest pain, rule out acute myocardial infarction    Azotemia    Dementia ([facility])    Immunosuppressant-induced pancytopenia     Benign hypertensive heart disease with heart failure ([facility])    Complication of heart transplant ([facility])    Pruritic disorder    Sensorineural hearing loss    Obstructive sleep apnea syndrome    Mitral valve disorder    Mild cognitive impairment with memory loss    Impotence of organic origin    Long-term use of immunosuppressant medication    Immunosuppressed status ([facility] [address]    Hyperlipidemia    History of heart transplant ([facility] [address]    History of esophageal dilatation    Hearing decreased    Hallux valgus, acquired    GERD (gastroesophageal reflux disease)    Coronary atherosclerosis    CHF (congestive heart failure) ([facility])    Chronic gouty arthropathy    Personal history of colonic polyps    THADDEUS (acute kidney injury) ([facility] [address]    Dementia with behavioral disturbance ([facility])    Depression    Calcineurin inhibitor causing toxicity in therapeutic use    Chronic cholecystitis    Heart transplant status (HCC)    Acute renal failure superimposed on chronic kidney disease, unspecified CKD stage, unspecified acute renal failure type    Muscle cramping    Dyspnea on exertion    Metabolic acidosis    Vertebral artery stenosis, unspecified laterality    Unsteady gait    Diplopia    ESRD on hemodialysis (HCC)    ESRD (end stage renal disease) (HCC)    Encephalopathy acute    Metabolic encephalopathy    Altered mental status    Primary hypertension    Anemia in ESRD (end-stage renal disease)        EGD today.     Arvis Siemens, MD  12/1/2023  8:10 AM

## 2024-01-05 ENCOUNTER — OFFICE VISIT (OUTPATIENT)
Dept: NEUROLOGY | Facility: CLINIC | Age: 79
End: 2024-01-05
Payer: COMMERCIAL

## 2024-01-05 VITALS
DIASTOLIC BLOOD PRESSURE: 74 MMHG | RESPIRATION RATE: 16 BRPM | HEART RATE: 52 BPM | BODY MASS INDEX: 26 KG/M2 | WEIGHT: 185 LBS | SYSTOLIC BLOOD PRESSURE: 128 MMHG | OXYGEN SATURATION: 97 %

## 2024-01-05 DIAGNOSIS — I65.23 CAROTID STENOSIS, BILATERAL: ICD-10-CM

## 2024-01-05 DIAGNOSIS — F03.C18 SEVERE DEMENTIA WITH OTHER BEHAVIORAL DISTURBANCE, UNSPECIFIED DEMENTIA TYPE (HCC): Primary | ICD-10-CM

## 2024-01-05 DIAGNOSIS — R41.82 ALTERED MENTAL STATUS, UNSPECIFIED ALTERED MENTAL STATUS TYPE: ICD-10-CM

## 2024-01-05 PROCEDURE — 3074F SYST BP LT 130 MM HG: CPT | Performed by: OTHER

## 2024-01-05 PROCEDURE — 99215 OFFICE O/P EST HI 40 MIN: CPT | Performed by: OTHER

## 2024-01-05 PROCEDURE — 1159F MED LIST DOCD IN RCRD: CPT | Performed by: OTHER

## 2024-01-05 PROCEDURE — 3078F DIAST BP <80 MM HG: CPT | Performed by: OTHER

## 2024-01-05 PROCEDURE — 1160F RVW MEDS BY RX/DR IN RCRD: CPT | Performed by: OTHER

## 2024-01-05 RX ORDER — MEMANTINE HYDROCHLORIDE 10 MG/1
10 TABLET ORAL DAILY
COMMUNITY
Start: 2023-12-25

## 2024-01-05 NOTE — PROGRESS NOTES
Patient is here with wife chelly valero. Patient most recent fall was on 12/224/2023. Confusion occurs daily.
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
## 2024-01-05 NOTE — PATIENT INSTRUCTIONS
Refill policies:    Allow 2-3 business days for refills; controlled substances may take longer.  Contact your pharmacy at least 5 days prior to running out of medication and have them send an electronic request or submit request through the “request refill” option in your [employer] account.  Refills are not addressed on weekends; covering physicians do not authorize routine medications on weekends.  No narcotics or controlled substances are refilled after noon on Fridays or by on call physicians.  By law, narcotics must be electronically prescribed.  A 30 day supply with no refills is the maximum allowed.  If your prescription is due for a refill, you may be due for a follow up appointment.  To best provide you care, patients receiving routine medications need to be seen at least once a year.  Patients receiving narcotic/controlled substance medications need to be seen at least once every 3 months.  In the event that your preferred pharmacy does not have the requested medication in stock (e.g. Backordered), it is your responsibility to find another pharmacy that has the requested medication available.  We will gladly send a new prescription to that pharmacy at your request.    Scheduling Tests:    If your physician has ordered radiology tests such as MRI or CT scans, please contact Central Scheduling at [phone] right away to schedule the test.  Once scheduled, the [facility] Centralized Referral Team will work with your insurance carrier to obtain pre-certification or prior authorization.  Depending on your insurance carrier, approval may take 3-10 days.  It is highly recommended patients assure they have received an authorization before having a test performed.  If test is done without insurance authorization, patient may be responsible for the entire amount billed.      Precertification and Prior Authorizations:  If your physician has recommended that you have a procedure or additional testing performed the [facility]  Centralized Referral Team will contact your insurance carrier to obtain pre-certification or prior authorization.    You are strongly encouraged to contact your insurance carrier to verify that your procedure/test has been approved and is a COVERED benefit.  Although the [facility] Centralized Referral Team does its due diligence, the insurance carrier gives the disclaimer that \"Although the procedure is authorized, this does not guarantee payment.\"    Ultimately the patient is responsible for payment.   Thank you for your understanding in this matter.  Paperwork Completion:  If you require FMLA or disability paperwork for your recovery, please make sure to either drop it off or have it faxed to our office at [phone]. Be sure the form has your name and date of birth on it.  The form will be faxed to our Forms Department and they will complete it for you.  There is a 25$ fee for all forms that need to be filled out.  Please be aware there is a 10-14 day turnaround time.  You will need to sign a release of information (PIYUSH) form if your paperwork does not come with one.  You may call the Forms Department with any questions at [phone].  Their fax number is [phone].

## 2024-01-07 ENCOUNTER — APPOINTMENT (OUTPATIENT)
Dept: CT IMAGING | Facility: HOSPITAL | Age: 79
End: 2024-01-07
Attending: EMERGENCY MEDICINE
Payer: MEDICARE

## 2024-01-07 ENCOUNTER — HOSPITAL ENCOUNTER (EMERGENCY)
Facility: HOSPITAL | Age: 79
Discharge: HOME | End: 2024-01-07
Attending: EMERGENCY MEDICINE
Payer: MEDICARE

## 2024-01-07 VITALS
DIASTOLIC BLOOD PRESSURE: 76 MMHG | HEIGHT: 71 IN | BODY MASS INDEX: 25.62 KG/M2 | OXYGEN SATURATION: 100 % | RESPIRATION RATE: 10 BRPM | SYSTOLIC BLOOD PRESSURE: 102 MMHG | TEMPERATURE: 98 F | HEART RATE: 72 BPM | WEIGHT: 183 LBS

## 2024-01-07 DIAGNOSIS — R25.1 OCCASIONAL TREMORS: Primary | ICD-10-CM

## 2024-01-07 LAB
ALBUMIN SERPL-MCNC: 3.6 G/DL (ref 3.4–5)
ALBUMIN/GLOB SERPL: 0.8 {RATIO} (ref 1–2)
ALP LIVER SERPL-CCNC: 196 U/L
ALT SERPL-CCNC: 23 U/L
ANION GAP SERPL CALC-SCNC: 8 MMOL/L (ref 0–18)
AST SERPL-CCNC: 20 U/L (ref 15–37)
ATRIAL RATE: 78 BPM
BASOPHILS # BLD AUTO: 0.04 X10(3) UL (ref 0–0.2)
BASOPHILS NFR BLD AUTO: 0.9 %
BILIRUB SERPL-MCNC: 0.5 MG/DL (ref 0.1–2)
BUN BLD-MCNC: 36 MG/DL (ref 9–23)
CALCIUM BLD-MCNC: 9.4 MG/DL (ref 8.5–10.1)
CHLORIDE SERPL-SCNC: 97 MMOL/L (ref 98–112)
CO2 SERPL-SCNC: 34 MMOL/L (ref 21–32)
CREAT BLD-MCNC: 8.86 MG/DL
EGFRCR SERPLBLD CKD-EPI 2021: 6 ML/MIN/1.73M2 (ref 60–?)
EOSINOPHIL # BLD AUTO: 0.39 X10(3) UL (ref 0–0.7)
EOSINOPHIL NFR BLD AUTO: 8.3 %
ERYTHROCYTE [DISTWIDTH] IN BLOOD BY AUTOMATED COUNT: 14.3 %
GLOBULIN PLAS-MCNC: 4.3 G/DL (ref 2.8–4.4)
GLUCOSE BLD-MCNC: 98 MG/DL (ref 70–99)
HCT VFR BLD AUTO: 36.3 %
HGB BLD-MCNC: 12.7 G/DL
IMM GRANULOCYTES # BLD AUTO: 0.02 X10(3) UL (ref 0–1)
IMM GRANULOCYTES NFR BLD: 0.4 %
LYMPHOCYTES # BLD AUTO: 1.95 X10(3) UL (ref 1–4)
LYMPHOCYTES NFR BLD AUTO: 41.7 %
MCH RBC QN AUTO: 30.5 PG (ref 26–34)
MCHC RBC AUTO-ENTMCNC: 35 G/DL (ref 31–37)
MCV RBC AUTO: 87.3 FL
MONOCYTES # BLD AUTO: 0.61 X10(3) UL (ref 0.1–1)
MONOCYTES NFR BLD AUTO: 13 %
NEUTROPHILS # BLD AUTO: 1.67 X10 (3) UL (ref 1.5–7.7)
NEUTROPHILS # BLD AUTO: 1.67 X10(3) UL (ref 1.5–7.7)
NEUTROPHILS NFR BLD AUTO: 35.7 %
OSMOLALITY SERPL CALC.SUM OF ELEC: 296 MOSM/KG (ref 275–295)
P AXIS: 73 DEGREES
P-R INTERVAL: 182 MS
PLATELET # BLD AUTO: 112 10(3)UL (ref 150–450)
POTASSIUM SERPL-SCNC: 3.7 MMOL/L (ref 3.5–5.1)
PROT SERPL-MCNC: 7.9 G/DL (ref 6.4–8.2)
Q-T INTERVAL: 470 MS
QRS DURATION: 148 MS
QTC CALCULATION (BEZET): 535 MS
R AXIS: 78 DEGREES
RBC # BLD AUTO: 4.16 X10(6)UL
SODIUM SERPL-SCNC: 139 MMOL/L (ref 136–145)
T AXIS: -45 DEGREES
VENTRICULAR RATE: 78 BPM
WBC # BLD AUTO: 4.7 X10(3) UL (ref 4–11)

## 2024-01-07 PROCEDURE — 70450 CT HEAD/BRAIN W/O DYE: CPT | Performed by: EMERGENCY MEDICINE

## 2024-01-07 PROCEDURE — 85025 COMPLETE CBC W/AUTO DIFF WBC: CPT

## 2024-01-07 PROCEDURE — 80053 COMPREHEN METABOLIC PANEL: CPT | Performed by: EMERGENCY MEDICINE

## 2024-01-07 PROCEDURE — 99285 EMERGENCY DEPT VISIT HI MDM: CPT

## 2024-01-07 PROCEDURE — 36415 COLL VENOUS BLD VENIPUNCTURE: CPT

## 2024-01-07 PROCEDURE — 85025 COMPLETE CBC W/AUTO DIFF WBC: CPT | Performed by: EMERGENCY MEDICINE

## 2024-01-07 PROCEDURE — 80053 COMPREHEN METABOLIC PANEL: CPT

## 2024-01-07 PROCEDURE — 93005 ELECTROCARDIOGRAM TRACING: CPT

## 2024-01-07 RX ORDER — LORAZEPAM 1 MG/1
0.5 TABLET ORAL ONCE
Status: COMPLETED | OUTPATIENT
Start: 2024-01-07 | End: 2024-01-07

## 2024-01-07 NOTE — ED PROVIDER NOTES
Patient Seen in: [facility] Emergency Department      History     Chief Complaint   Patient presents with    Tremors     Stated Complaint: worsening tremors    Subjective:   HPI    History is provided by the wife at bedside the patient has a history of dementia and is unable provide a history.  He also has [facility] below.  He has been having occasional tremors throughout the day today.  He has had this before in the past but is been very transient and has not lasted more than a few minutes.  Involves upper body, last for few seconds with memory occurring often.  No falls or injuries.  No headaches or nausea no vomit no fevers or chills.  ESRD on dialysis, last dialysis was yesterday.    Objective:   Past Medical History:   Diagnosis Date    Back problem     BPH (benign prostatic hyperplasia)     Cataract     CKD (chronic kidney disease)     Congestive heart disease (HCC)     before transplant in 2006    Dialysis patient ([facility]) 07/27/2023    HEMODIALYSIS TUES/THURS/SAT 3.5HRS    Esophageal reflux     Fistula     RIGHT ARM    Glaucoma     Gout     Hearing impairment     HA's x2, doesnt wear    Heart transplanted ([facility]) 2006 2006-@ [facility]    High blood pressure     High cholesterol     Hypercholesterolemia     Muscle weakness     uses a cane- balance issues    Obstructive sleep apnea syndrome 02/04/2009    Problems with swallowing     Renal disorder     CKD    Short-term memory loss     Shortness of breath     Sleep apnea     had UPPP did not need device after    Visual impairment     glasses              Past Surgical History:   Procedure Laterality Date    ARTHROSCOPY OF JOINT UNLISTED Right     rotator cuff repair    AV FISTULA REVISION, OPEN      incorrect info per wife    COLONOSCOPY      [facility]ART TR[facility]PLANT  2006    [facility]     KNEE REPLACEMENT SURGERY Left 2018    x2    OTHER SURGICAL HISTORY  08/05/2021    Cysto Dr. Delgado    ROTATOR CUFF REPAIR      SINUS SURGERY        TONSILLECTOMY       TOTAL KNEE REPLACEMENT Left     2 on this leg    UPPER GI ENDOSCOPY,EXAM                  Social History     Socioeconomic History    Marital status:    Tobacco Use    Smoking status: Former     Types: Cigarettes     Quit date:      Years since quittin.0    Smokeless tobacco: Never   Vaping Use    Vaping Use: Never used   Substance and Sexual Activity    Alcohol use: Yes     Comment: 1 beer per day    Drug use: Not Currently    Sexual activity: Yes   Other Topics Concern    Caffeine Concern Yes     Comment: rarely    Exercise No     Social Determinants of Health     Food Insecurity: No Food Insecurity (11/3/2023)    Food Insecurity     Food Insecurity: Never true   Transportation Needs: No Transportation Needs (11/3/2023)    Transportation Needs     Lack of Transportation: No   Housing Stability: Low Risk  (11/3/2023)    Housing Stability     Housing Instability: No              Review of Systems    Positive for stated complaint: worsening tremors  Other systems are as noted in HPI.  Constitutional and vital signs reviewed.      All other systems reviewed and negative except as noted above.    Physical Exam     ED Triage Vitals [24 1300]   /66   Pulse 81   Resp 16   Temp 98.4 °F (36.9 °C)   Temp src Temporal   SpO2 96 %   O2 Device None (Room air)       Current:/76   Pulse 72   Temp 98.4 °F (36.9 °C) (Temporal)   Resp 10   Ht 180.3 cm (5' 11\")   Wt 83 kg   SpO2 100%   BMI 25.52 kg/m²         Physical Exam    Physical Exam   Constitutional: Awake, alert, well appearing  Head: Normocephalic and atraumatic.   Eyes: Conjunctivae are normal. Pupils are equal, round, and reactive to light.   Neck: Normal range of motion. Neck supple.   Cardiovascular: Normal rate, regular rhythm  Pulmonary/Chest: Normal effort.  No accessory muscle use.  No clubbing, no cyanosis.  Abdominal: Soft. Bowel sounds are normal.   Neurological: Pt is alert and oriented to person, place, but not time or  situation.  No cranial nerve deficits, moves all 4 extremities to good coordination.      He has an occasional shake that involves his head and torso, last for secondary to then resolves.  Bilateral.    Skin: Skin is warm and dry.    ED Course     Labs Reviewed   COMP METABOLIC PANEL (14) - Abnormal; Notable for the following components:       Result Value    Chloride 97 (*)     CO2 34.0 (*)     BUN 36 (*)     Creatinine 8.86 (*)     Calculated Osmolality 296 (*)     eGFR-Cr 6 (*)     Alkaline Phosphatase 196 (*)     A/G Ratio 0.8 (*)     All other components within normal limits   CBC W/ DIFFERENTIAL - Abnormal; Notable for the following components:    HGB 12.7 (*)     HCT 36.3 (*)     .0 (*)     All other components within normal limits   CBC WITH DIFFERENTIAL WITH PLATELET    Narrative:     The following orders were created for panel order CBC With Differential With Platelet.  Procedure                               Abnormality         Status                     ---------                               -----------         ------                     CBC W/ DIFFERENTIAL[[id number]]          Abnormal            Final result                 Please view results for these tests on the individual orders.   RAINBOW DRAW LAVENDER   RAINBOW DRAW LIGHT GREEN   RAINBOW DRAW BLUE     EKG    Rate, intervals and axes as noted on EKG Report.  Rate: 78  Rhythm: Sinus Rhythm  Reading: Sinus rhythm acute ischemia.  ST and T wave changes have not seen on old EKGs                 Medications   LORazepam (Ativan) tab 0.5 mg (0.5 mg Oral Given 1/7/24 1413)     CBC CMP fine         MDM          Differential diagnoses considered: Intracranial hemorrhage, space-occupying lesion, less likely to be short seizures, favoring either essential tremor, or potentially medication reaction for the respite all.    Wife is concerned about Parkinson's.      I have given him a half a tablet of Ativan to see if this helps control his symptoms, if  the CT brain is negative we will discharge him home with a prescription for the same to take as needed for tremors but I explained the side effects of the medication notably sedation and gait instability.    Ideally they should follow-up with a neurologist to discuss further.      *My independent interpretation of radiographs: No intracranial hemorrhage    *Discussion of ongoing management of this patient's care included: n/a  *Comorbidities contributing to the complexity of decision making: Mixed dementia CKD on dialysis, history of heart transplant  *External charts reviewed: Neurology note from 2 days ago reviewed.  He has a history of mixed dementia and is on donepezil and amantadine  *Additional sources of history: History provided by wife given the patient's dementia    Shared decision making was done by: Family at bedside, myself.                                     Medical Decision Making      Disposition and Plan     Clinical Impression:  1. Occasional tremors         Disposition:  Discharge  1/7/2024  4:17 pm    Follow-up:  Jorge Echols MD  [facility]  [address] [address]  [phone]    Call in 1 day(s)            Medications Prescribed:  Discharge Medication List as of 1/7/2024  4:25 PM

## 2024-01-07 NOTE — ED INITIAL ASSESSMENT (HPI)
Pt states generalized tremors.  Pt has had in past but has resolved. Pt had last dialysis yesterday. Pt denies any cough, or cold symptoms.  Pt with hx of dementia.

## 2024-01-09 ENCOUNTER — OFFICE VISIT (OUTPATIENT)
Facility: LOCATION | Age: 79
End: 2024-01-09
Payer: COMMERCIAL

## 2024-01-09 DIAGNOSIS — N18.6 ESRD ON DIALYSIS (HCC): Primary | ICD-10-CM

## 2024-01-09 DIAGNOSIS — Z99.2 ESRD ON DIALYSIS (HCC): Primary | ICD-10-CM

## 2024-01-09 PROCEDURE — 99244 OFF/OP CNSLTJ NEW/EST MOD 40: CPT | Performed by: SURGERY

## 2024-01-09 NOTE — H&P (VIEW-ONLY)
New Patient Visit Note       Active Problems      1. ESRD on dialysis (HCC)        Chief Complaint   Chief Complaint   Patient presents with    New Patient     NP - consult peritoneal dialysis cath       History of Present Illness     The patient presents with his wife in order to discuss initiating peritoneal dialysis and catheter placement.  The patient has history of heart transplant many years ago and is currently only on tacrolimus.  The patient has end-stage renal disease likely secondary to hypertension and he requires hemodialysis 3 days/week.  The patient wishes to transition to peritoneal dialysis and the patient and his spouse state they have received preliminary information and training from the dialysis center.  The patient and spouse are eager to transition peritoneal dialysis.    I discussed risk, benefits, alternatives of peritoneal dialysis catheter placement.  With a brief discussion regarding course and conduct of home peritoneal dialysis with the patient and his spouse.  All questions were answered to the patient and spouse's understanding.  They are eager to proceed with PD catheter placement.    The patient denies fever, chills, chest pain, shortness of breath, dyspnea. The patient also denies hematemesis, melena, or hematochezia. The patient denies change in bowel or bladder habits. There is no complaint of hematuria or dysuria.    Allergies  Nader is allergic to amlodipine, dalteparin, lisinopril, penicillins, and oxycodone.    Past Medical / Surgical / Social / Family History    The past medical and past surgical history have been reviewed by me today.    Past Medical History:   Diagnosis Date    Back problem     BPH (benign prostatic hyperplasia)     Cataract     CKD (chronic kidney disease)     Congestive heart disease (HCC)     before transplant in 2006    Dialysis patient ([facility]) 07/27/2023    HEMODIALYSIS TUES/THURS/SAT 3.5HRS    Esophageal reflux     Fistula     RIGHT ARM    Glaucoma      Gout     Hearing impairment     HA's x2, doesnt wear    Heart transplanted ([facility]) 2006-@ [facility]    High blood pressure     High cholesterol     Hypercholesterolemia     Muscle weakness     uses a cane- balance issues    Obstructive sleep apnea syndrome 2009    Problems with swallowing     Renal disorder     CKD    Short-term memory loss     Shortness of breath     Sleep apnea     had UPPP did not need device after    Visual impairment     glasses     Past Surgical History:   Procedure Laterality Date    ARTHROSCOPY OF JOINT UNLISTED Right     rotator cuff repair    AV FISTULA REVISION, OPEN      incorrect info per wife    COLONOSCOPY      [facility]     KNEE REPLACEMENT SURGERY Left 2018    x2    OTHER SURGICAL HISTORY  2021    Cysto Dr. Delgado    ROTATOR CUFF REPAIR      SINUS SURGERY        TONSILLECTOMY      TOTAL KNEE REPLACEMENT Left     2 on this leg    UPPER GI ENDOSCOPY,EXAM         The family history and social history have been reviewed by me today.    Family History   Problem Relation Age of Onset    Heart Disorder Father         heart attack    Heart Disorder Mother         CHF     Social History     Socioeconomic History    Marital status:    Tobacco Use    Smoking status: Former     Types: Cigarettes     Quit date:      Years since quittin.0    Smokeless tobacco: Never   Vaping Use    Vaping Use: Never used   Substance and Sexual Activity    Alcohol use: Yes     Comment: 1 beer per day    Drug use: Not Currently    Sexual activity: Yes   Other Topics Concern    Caffeine Concern Yes     Comment: rarely    Exercise No        Current Outpatient Medications:     memantine 10 MG Oral Tab, Take 1 tablet (10 mg total) by mouth daily., Disp: , Rfl:     risperiDONE 0.5 MG Oral Tab, Take 1.5 tablets (0.75 mg total) by mouth nightly. May also take 0.5 tablet (0.25 mg total) daily as needed (agitation)., Disp: 60 tablet, Rfl: 0    tacrolimus  1 MG Oral Cap, Take 1 capsule (1 mg total) by mouth 2 (two) times daily. FOR HEART TRANSPLANT PATIENT TAKES TACROLIMUS 1.5 MG IN THE AM AND 1 MG IN THE EVENING, Disp: , Rfl:     sevelamer carbonate 800 MG Oral Tab, Take 1 tablet (800 mg total) by mouth 3 (three) times daily with meals., Disp: , Rfl:     gabapentin 100 MG Oral Cap, Take 1 capsule (100 mg total) by mouth 3 (three) times a week., Disp: , Rfl:     Nephro-Jaime 0.8 MG Oral Tab, Take 1 tablet (0.8 mg total) by mouth daily., Disp: , Rfl:     tacrolimus 0.5 MG Oral Cap, Take 1 capsule (0.5 mg total) by mouth 2 (two) times daily. FOR HEART TRANSPLANT, Disp: , Rfl: 0    donepezil 10 MG Oral Tab, Take 1 tablet (10 mg total) by mouth nightly., Disp: 90 tablet, Rfl: 3    hydrOXYzine 25 MG Oral Tab, Take 1 tablet (25 mg total) by mouth every 8 (eight) hours as needed for Itching., Disp: 90 tablet, Rfl: 3    Pravastatin Sodium 20 MG Oral Tab, Take 1 tablet (20 mg total) by mouth nightly., Disp: , Rfl:     allopurinol 100 MG Oral Tab, Take 0.5 tablets (50 mg total) by mouth daily., Disp: , Rfl:       Review of Systems  The Review of Systems has been reviewed by me during today.  Review of Systems    Physical Findings   There were no vitals taken for this visit.  Physical Exam  Vitals and nursing note reviewed.   Constitutional:       General: He is not in acute distress.     Appearance: He is well-developed.   HENT:      Head: Normocephalic and atraumatic.   Eyes:      General: No scleral icterus.     Pupils: Pupils are equal, round, and reactive to light.   Neck:      Vascular: No JVD.      Trachea: No tracheal deviation.   Cardiovascular:      Rate and Rhythm: Normal rate and regular rhythm.   Pulmonary:      Effort: Pulmonary effort is normal. No respiratory distress.      Breath sounds: No stridor.   Abdominal:      General: Bowel sounds are normal. There is no distension.      Palpations: Abdomen is soft. Abdomen is not rigid. There is no mass.      Tenderness:  There is no abdominal tenderness. There is no guarding or rebound. Negative signs include Low's sign and McBurney's sign.      Hernia: No hernia is present.   Musculoskeletal:         General: Normal range of motion.      Cervical back: Normal range of motion and neck supple.   Skin:     General: Skin is warm and dry.   Neurological:      Mental Status: He is alert and oriented to person, place, and time.   Psychiatric:         Behavior: Behavior normal.             Assessment   1. ESRD on dialysis (HCC)          Plan     The patient will be scheduled for laparoscopic peritoneal dialysis catheter placement.    The harinder-operative care plan was discussed with the patient, who voices understanding.  Activity and lifting recommendations were discussed in length.     The risks, benefits, and alternatives to the procedure were explained to the patient.  The risks explained include, but are not limited to, bleeding, infection, pain wound complications, recurrence, incorrect diagnosis, injury to adjacent organs and structures. We also discussed the possibile need for further therapeutic, diagnostic, or surgical intervention.  The patient voiced understanding, and after all questions were answered to the patient's satisfaction, the patient provided willing and informed consent to proceed.     No orders of the defined types were placed in this encounter.      Imaging & Referrals   None    Follow Up  No follow-ups on file.    Julio Cesar Reyes MD

## 2024-01-09 NOTE — H&P
New Patient Visit Note       Active Problems      1. ESRD on dialysis (HCC)        Chief Complaint   Chief Complaint   Patient presents with    New Patient     NP - consult peritoneal dialysis cath       History of Present Illness     The patient presents with his wife in order to discuss initiating peritoneal dialysis and catheter placement.  The patient has history of heart transplant many years ago and is currently only on tacrolimus.  The patient has end-stage renal disease likely secondary to hypertension and he requires hemodialysis 3 days/week.  The patient wishes to transition to peritoneal dialysis and the patient and his spouse state they have received preliminary information and training from the dialysis center.  The patient and spouse are eager to transition peritoneal dialysis.    I discussed risk, benefits, alternatives of peritoneal dialysis catheter placement.  With a brief discussion regarding course and conduct of home peritoneal dialysis with the patient and his spouse.  All questions were answered to the patient and spouse's understanding.  They are eager to proceed with PD catheter placement.    The patient denies fever, chills, chest pain, shortness of breath, dyspnea. The patient also denies hematemesis, melena, or hematochezia. The patient denies change in bowel or bladder habits. There is no complaint of hematuria or dysuria.    Allergies  Nader is allergic to amlodipine, dalteparin, lisinopril, penicillins, and oxycodone.    Past Medical / Surgical / Social / Family History    The past medical and past surgical history have been reviewed by me today.    Past Medical History:   Diagnosis Date    Back problem     BPH (benign prostatic hyperplasia)     Cataract     CKD (chronic kidney disease)     Congestive heart disease (HCC)     before transplant in 2006    Dialysis patient ([facility]) 07/27/2023    HEMODIALYSIS TUES/THURS/SAT 3.5HRS    Esophageal reflux     Fistula     RIGHT ARM    Glaucoma      Gout     Hearing impairment     HA's x2, doesnt wear    Heart transplanted ([facility]) 2006-@ [facility]    High blood pressure     High cholesterol     Hypercholesterolemia     Muscle weakness     uses a cane- balance issues    Obstructive sleep apnea syndrome 2009    Problems with swallowing     Renal disorder     CKD    Short-term memory loss     Shortness of breath     Sleep apnea     had UPPP did not need device after    Visual impairment     glasses     Past Surgical History:   Procedure Laterality Date    ARTHROSCOPY OF JOINT UNLISTED Right     rotator cuff repair    AV FISTULA REVISION, OPEN      incorrect info per wife    COLONOSCOPY      [facility] TRANSPLANT      [facility]     KNEE REPLACEMENT SURGERY Left 2018    x2    OTHER SURGICAL HISTORY  2021    Cysto Dr. Delgado    ROTATOR CUFF REPAIR      SINUS SURGERY        TONSILLECTOMY      TOTAL KNEE REPLACEMENT Left     2 on this leg    UPPER GI ENDOSCOPY,EXAM         The family history and social history have been reviewed by me today.    Family History   Problem Relation Age of Onset    Heart Disorder Father         heart attack    Heart Disorder Mother         CHF     Social History     Socioeconomic History    Marital status:    Tobacco Use    Smoking status: Former     Types: Cigarettes     Quit date:      Years since quittin.0    Smokeless tobacco: Never   Vaping Use    Vaping Use: Never used   Substance and Sexual Activity    Alcohol use: Yes     Comment: 1 beer per day    Drug use: Not Currently    Sexual activity: Yes   Other Topics Concern    Caffeine Concern Yes     Comment: rarely    Exercise No        Current Outpatient Medications:     memantine 10 MG Oral Tab, Take 1 tablet (10 mg total) by mouth daily., Disp: , Rfl:     risperiDONE 0.5 MG Oral Tab, Take 1.5 tablets (0.75 mg total) by mouth nightly. May also take 0.5 tablet (0.25 mg total) daily as needed (agitation)., Disp: 60 tablet, Rfl: 0    tacrolimus  1 MG Oral Cap, Take 1 capsule (1 mg total) by mouth 2 (two) times daily. FOR HEART TRANSPLANT PATIENT TAKES TACROLIMUS 1.5 MG IN THE AM AND 1 MG IN THE EVENING, Disp: , Rfl:     sevelamer carbonate 800 MG Oral Tab, Take 1 tablet (800 mg total) by mouth 3 (three) times daily with meals., Disp: , Rfl:     gabapentin 100 MG Oral Cap, Take 1 capsule (100 mg total) by mouth 3 (three) times a week., Disp: , Rfl:     Nephro-Jaime 0.8 MG Oral Tab, Take 1 tablet (0.8 mg total) by mouth daily., Disp: , Rfl:     tacrolimus 0.5 MG Oral Cap, Take 1 capsule (0.5 mg total) by mouth 2 (two) times daily. FOR HEART TRANSPLANT, Disp: , Rfl: 0    donepezil 10 MG Oral Tab, Take 1 tablet (10 mg total) by mouth nightly., Disp: 90 tablet, Rfl: 3    hydrOXYzine 25 MG Oral Tab, Take 1 tablet (25 mg total) by mouth every 8 (eight) hours as needed for Itching., Disp: 90 tablet, Rfl: 3    Pravastatin Sodium 20 MG Oral Tab, Take 1 tablet (20 mg total) by mouth nightly., Disp: , Rfl:     allopurinol 100 MG Oral Tab, Take 0.5 tablets (50 mg total) by mouth daily., Disp: , Rfl:       Review of Systems  The Review of Systems has been reviewed by me during today.  Review of Systems    Physical Findings   There were no vitals taken for this visit.  Physical Exam  Vitals and nursing note reviewed.   Constitutional:       General: He is not in acute distress.     Appearance: He is well-developed.   HENT:      Head: Normocephalic and atraumatic.   Eyes:      General: No scleral icterus.     Pupils: Pupils are equal, round, and reactive to light.   Neck:      Vascular: No JVD.      Trachea: No tracheal deviation.   Cardiovascular:      Rate and Rhythm: Normal rate and regular rhythm.   Pulmonary:      Effort: Pulmonary effort is normal. No respiratory distress.      Breath sounds: No stridor.   Abdominal:      General: Bowel sounds are normal. There is no distension.      Palpations: Abdomen is soft. Abdomen is not rigid. There is no mass.      Tenderness:  There is no abdominal tenderness. There is no guarding or rebound. Negative signs include Low's sign and McBurney's sign.      Hernia: No hernia is present.   Musculoskeletal:         General: Normal range of motion.      Cervical back: Normal range of motion and neck supple.   Skin:     General: Skin is warm and dry.   Neurological:      Mental Status: He is alert and oriented to person, place, and time.   Psychiatric:         Behavior: Behavior normal.             Assessment   1. ESRD on dialysis (HCC)          Plan     The patient will be scheduled for laparoscopic peritoneal dialysis catheter placement.    The harinder-operative care plan was discussed with the patient, who voices understanding.  Activity and lifting recommendations were discussed in length.     The risks, benefits, and alternatives to the procedure were explained to the patient.  The risks explained include, but are not limited to, bleeding, infection, pain wound complications, recurrence, incorrect diagnosis, injury to adjacent organs and structures. We also discussed the possibile need for further therapeutic, diagnostic, or surgical intervention.  The patient voiced understanding, and after all questions were answered to the patient's satisfaction, the patient provided willing and informed consent to proceed.     No orders of the defined types were placed in this encounter.      Imaging & Referrals   None    Follow Up  No follow-ups on file.    Julio Cesar Reyes MD

## 2024-01-16 ENCOUNTER — TELEPHONE (OUTPATIENT)
Facility: LOCATION | Age: 79
End: 2024-01-16

## 2024-01-16 NOTE — TELEPHONE ENCOUNTER
Transaction ID: [id number] ID: [id number] Date: 2024-01-16   Helpful Hint: Review [employer] policy information to avoid unnecessary requests  MARJORIE ANNE Patient  Member ID  [id number]    Date of Birth  1945-08-10    Gender  Male    Eligibility Status  Active Coverage    Group Number  [id number]    Plan / Coverage Date  2024-01-01    Transaction Type  Outpatient Authorization/Referral    Organization  [facility]    Payer  [employer]    [employer] logo     Certificate Information  Reference Number  NA    Status  NO ACTION REQUIRED    [employer] Record Number  [id number]    Message  For Member contracts which cover this service, no prior authorization is necessary. Please contact [facility] Customer Service at the number on the back of the Member ID card.  Member Information  Patient Name  MARJORIE ANNE    Patient Date of Birth  1945-08-10    Patient Gender  Male    Member ID  [id number]    Relationship to Subscriber  Self    Subscriber Name  MARJORIE ANNE    Requesting Provider     Name  ROSENDO LONG    NPI  [id number]    Specialty  [id number]  Provider Role  Provider    Address  [address] [place] [address]    Phone  [phone]  Fax  [phone]    Contact Name  LIUDMILA MIMI    Service Information  Service Type  2 - Surgical    Place of Service  22 - [facility]    Service From - To Date  2024-01-26 - 2024-02-28    Quantity  1 Units  Diagnosis Code 1  N186 - End stage renal disease    Diagnosis Code 2  Z992 - Dependence on renal dialysis    Procedure Code 1 (CPT/HCPCS)  61449 - LAP INSERT TUNNEL IP CATH    Quantity  1 Units    Rendering Provider/Facility     Provider 1  Name  ROSENDO LONG    NPI  [id number]    Specialty  [id number]  Provider Role  Attending    Address  [address] [place] [address]    Phone  [phone]  Provider 2  Name  [facility]    NPI  [id number]    Provider Role  Facility    Address  [address]  [place] [address]    Phone  [phone]

## 2024-01-17 ENCOUNTER — TELEPHONE (OUTPATIENT)
Facility: LOCATION | Age: 79
End: 2024-01-17

## 2024-01-17 NOTE — PAT NURSING NOTE
Received response to contraindication alert for heparin due to Deltaparin allergy;substitute ordered is Lovenox and a contraindication alert was received for this medication due to like ingredients.I spoke to Rosangela,medical assistant at ,surgeon office she stated a message was sent to nurse to address with  .Awaiting response

## 2024-01-17 NOTE — TELEPHONE ENCOUNTER
Pt is having sx on 1/26 with PBP and has had contraindication to Heparin and Lovenox and faxed a communication asking if they should proceed with Lovenox or use something else    Please advise  Zo's call back number is [phone]

## 2024-01-26 ENCOUNTER — HOSPITAL ENCOUNTER (OUTPATIENT)
Facility: HOSPITAL | Age: 79
Setting detail: HOSPITAL OUTPATIENT SURGERY
Discharge: HOME | End: 2024-01-26
Attending: SURGERY | Admitting: SURGERY
Payer: MEDICARE

## 2024-01-26 ENCOUNTER — ANESTHESIA (OUTPATIENT)
Dept: SURGERY | Facility: HOSPITAL | Age: 79
End: 2024-01-26
Payer: MEDICARE

## 2024-01-26 ENCOUNTER — ANESTHESIA EVENT (OUTPATIENT)
Dept: SURGERY | Facility: HOSPITAL | Age: 79
End: 2024-01-26
Payer: MEDICARE

## 2024-01-26 VITALS
HEART RATE: 89 BPM | TEMPERATURE: 97 F | RESPIRATION RATE: 21 BRPM | WEIGHT: 186.81 LBS | HEIGHT: 71 IN | SYSTOLIC BLOOD PRESSURE: 105 MMHG | OXYGEN SATURATION: 98 % | BODY MASS INDEX: 26.15 KG/M2 | DIASTOLIC BLOOD PRESSURE: 79 MMHG

## 2024-01-26 DIAGNOSIS — Z99.2 ESRD ON HEMODIALYSIS (HCC): Primary | ICD-10-CM

## 2024-01-26 DIAGNOSIS — N18.6 ESRD ON HEMODIALYSIS (HCC): Primary | ICD-10-CM

## 2024-01-26 LAB
ANION GAP SERPL CALC-SCNC: 3 MMOL/L (ref 0–18)
BUN BLD-MCNC: 45 MG/DL (ref 9–23)
CALCIUM BLD-MCNC: 8.8 MG/DL (ref 8.5–10.1)
CHLORIDE SERPL-SCNC: 102 MMOL/L (ref 98–112)
CO2 SERPL-SCNC: 33 MMOL/L (ref 21–32)
CREAT BLD-MCNC: 10.5 MG/DL
EGFRCR SERPLBLD CKD-EPI 2021: 5 ML/MIN/1.73M2 (ref 60–?)
ERYTHROCYTE [DISTWIDTH] IN BLOOD BY AUTOMATED COUNT: 14.4 %
GLUCOSE BLD-MCNC: 95 MG/DL (ref 70–99)
HCT VFR BLD AUTO: 31.6 %
HGB BLD-MCNC: 11.3 G/DL
MCH RBC QN AUTO: 30.3 PG (ref 26–34)
MCHC RBC AUTO-ENTMCNC: 35.8 G/DL (ref 31–37)
MCV RBC AUTO: 84.7 FL
OSMOLALITY SERPL CALC.SUM OF ELEC: 297 MOSM/KG (ref 275–295)
PLATELET # BLD AUTO: 133 10(3)UL (ref 150–450)
POTASSIUM SERPL-SCNC: 4.8 MMOL/L (ref 3.5–5.1)
RBC # BLD AUTO: 3.73 X10(6)UL
SODIUM SERPL-SCNC: 138 MMOL/L (ref 136–145)
WBC # BLD AUTO: 4 X10(3) UL (ref 4–11)

## 2024-01-26 PROCEDURE — 49324 LAP INSERT TUNNEL IP CATH: CPT | Performed by: SURGERY

## 2024-01-26 PROCEDURE — 49324 LAP INSERT TUNNEL IP CATH: CPT

## 2024-01-26 PROCEDURE — 0WHG43Z INSERTION OF INFUSION DEVICE INTO PERITONEAL CAVITY, PERCUTANEOUS ENDOSCOPIC APPROACH: ICD-10-PCS | Performed by: SURGERY

## 2024-01-26 DEVICE — PERITONEAL DIALYSIS CATHETER KIT, SWAN NECK CURL CATH, 2 CUFFS LEFT
Type: IMPLANTABLE DEVICE | Site: ABDOMEN | Status: FUNCTIONAL
Brand: ARGYLE

## 2024-01-26 RX ORDER — HYDROMORPHONE HYDROCHLORIDE 1 MG/ML
0.2 INJECTION, SOLUTION INTRAMUSCULAR; INTRAVENOUS; SUBCUTANEOUS EVERY 5 MIN PRN
Status: DISCONTINUED | OUTPATIENT
Start: 2024-01-26 | End: 2024-01-26

## 2024-01-26 RX ORDER — ACETAMINOPHEN 500 MG
1000 TABLET ORAL ONCE AS NEEDED
Status: DISCONTINUED | OUTPATIENT
Start: 2024-01-26 | End: 2024-01-26

## 2024-01-26 RX ORDER — LIDOCAINE HYDROCHLORIDE AND EPINEPHRINE 10; 10 MG/ML; UG/ML
INJECTION, SOLUTION INFILTRATION; PERINEURAL AS NEEDED
Status: DISCONTINUED | OUTPATIENT
Start: 2024-01-26 | End: 2024-01-26

## 2024-01-26 RX ORDER — ONDANSETRON 2 MG/ML
4 INJECTION INTRAMUSCULAR; INTRAVENOUS EVERY 6 HOURS PRN
Status: DISCONTINUED | OUTPATIENT
Start: 2024-01-26 | End: 2024-01-26

## 2024-01-26 RX ORDER — EPHEDRINE SULFATE 50 MG/ML
INJECTION INTRAVENOUS AS NEEDED
Status: DISCONTINUED | OUTPATIENT
Start: 2024-01-26 | End: 2024-01-26 | Stop reason: SURG

## 2024-01-26 RX ORDER — DEXAMETHASONE SODIUM PHOSPHATE 4 MG/ML
VIAL (ML) INJECTION AS NEEDED
Status: DISCONTINUED | OUTPATIENT
Start: 2024-01-26 | End: 2024-01-26 | Stop reason: SURG

## 2024-01-26 RX ORDER — HYDROCODONE BITARTRATE AND ACETAMINOPHEN 5; 325 MG/1; MG/1
1 TABLET ORAL ONCE AS NEEDED
Status: DISCONTINUED | OUTPATIENT
Start: 2024-01-26 | End: 2024-01-26

## 2024-01-26 RX ORDER — LIDOCAINE HYDROCHLORIDE 40 MG/ML
INJECTION, SOLUTION RETROBULBAR; TOPICAL AS NEEDED
Status: DISCONTINUED | OUTPATIENT
Start: 2024-01-26 | End: 2024-01-26 | Stop reason: SURG

## 2024-01-26 RX ORDER — BUPIVACAINE HYDROCHLORIDE 5 MG/ML
INJECTION, SOLUTION EPIDURAL; INTRACAUDAL AS NEEDED
Status: DISCONTINUED | OUTPATIENT
Start: 2024-01-26 | End: 2024-01-26

## 2024-01-26 RX ORDER — NALOXONE HYDROCHLORIDE 0.4 MG/ML
0.08 INJECTION, SOLUTION INTRAMUSCULAR; INTRAVENOUS; SUBCUTANEOUS AS NEEDED
Status: DISCONTINUED | OUTPATIENT
Start: 2024-01-26 | End: 2024-01-26

## 2024-01-26 RX ORDER — ROCURONIUM BROMIDE 10 MG/ML
INJECTION, SOLUTION INTRAVENOUS AS NEEDED
Status: DISCONTINUED | OUTPATIENT
Start: 2024-01-26 | End: 2024-01-26 | Stop reason: SURG

## 2024-01-26 RX ORDER — SODIUM CHLORIDE, SODIUM LACTATE, POTASSIUM CHLORIDE, CALCIUM CHLORIDE 600; 310; 30; 20 MG/100ML; MG/100ML; MG/100ML; MG/100ML
INJECTION, SOLUTION INTRAVENOUS CONTINUOUS
Status: DISCONTINUED | OUTPATIENT
Start: 2024-01-26 | End: 2024-01-26

## 2024-01-26 RX ORDER — ONDANSETRON 2 MG/ML
INJECTION INTRAMUSCULAR; INTRAVENOUS AS NEEDED
Status: DISCONTINUED | OUTPATIENT
Start: 2024-01-26 | End: 2024-01-26 | Stop reason: SURG

## 2024-01-26 RX ORDER — SODIUM CHLORIDE 9 MG/ML
INJECTION, SOLUTION INTRAVENOUS CONTINUOUS
Status: DISCONTINUED | OUTPATIENT
Start: 2024-01-26 | End: 2024-01-26

## 2024-01-26 RX ORDER — HYDROMORPHONE HYDROCHLORIDE 1 MG/ML
0.4 INJECTION, SOLUTION INTRAMUSCULAR; INTRAVENOUS; SUBCUTANEOUS EVERY 5 MIN PRN
Status: DISCONTINUED | OUTPATIENT
Start: 2024-01-26 | End: 2024-01-26

## 2024-01-26 RX ORDER — HYDROCODONE BITARTRATE AND ACETAMINOPHEN 5; 325 MG/1; MG/1
1-2 TABLET ORAL EVERY 6 HOURS PRN
Qty: 10 TABLET | Refills: 0 | Status: SHIPPED | OUTPATIENT
Start: 2024-01-26

## 2024-01-26 RX ORDER — CEFAZOLIN SODIUM/WATER 2 G/20 ML
2 SYRINGE (ML) INTRAVENOUS ONCE
Status: COMPLETED | OUTPATIENT
Start: 2024-01-26 | End: 2024-01-26

## 2024-01-26 RX ORDER — PHENYLEPHRINE HCL 10 MG/ML
VIAL (ML) INJECTION AS NEEDED
Status: DISCONTINUED | OUTPATIENT
Start: 2024-01-26 | End: 2024-01-26 | Stop reason: SURG

## 2024-01-26 RX ORDER — SENNA AND DOCUSATE SODIUM 50; 8.6 MG/1; MG/1
1 TABLET, FILM COATED ORAL DAILY
Qty: 30 TABLET | Refills: 0 | Status: SHIPPED | OUTPATIENT
Start: 2024-01-26

## 2024-01-26 RX ORDER — ACETAMINOPHEN 500 MG
1000 TABLET ORAL ONCE
Status: DISCONTINUED | OUTPATIENT
Start: 2024-01-26 | End: 2024-01-26 | Stop reason: HOSPADM

## 2024-01-26 RX ORDER — FONDAPARINUX SODIUM 2.5 MG/.5ML
2.5 INJECTION SUBCUTANEOUS DAILY
Status: DISCONTINUED | OUTPATIENT
Start: 2024-01-26 | End: 2024-01-26 | Stop reason: HOSPADM

## 2024-01-26 RX ORDER — LIDOCAINE HYDROCHLORIDE 10 MG/ML
INJECTION, SOLUTION EPIDURAL; INFILTRATION; INTRACAUDAL; PERINEURAL AS NEEDED
Status: DISCONTINUED | OUTPATIENT
Start: 2024-01-26 | End: 2024-01-26 | Stop reason: SURG

## 2024-01-26 RX ADMIN — EPHEDRINE SULFATE 10 MG: 50 INJECTION INTRAVENOUS at 12:29:00

## 2024-01-26 RX ADMIN — SODIUM CHLORIDE: 9 INJECTION, SOLUTION INTRAVENOUS at 13:06:00

## 2024-01-26 RX ADMIN — ONDANSETRON 4 MG: 2 INJECTION INTRAMUSCULAR; INTRAVENOUS at 12:33:00

## 2024-01-26 RX ADMIN — LIDOCAINE HYDROCHLORIDE 50 MG: 10 INJECTION, SOLUTION EPIDURAL; INFILTRATION; INTRACAUDAL; PERINEURAL at 12:13:00

## 2024-01-26 RX ADMIN — CEFAZOLIN SODIUM/WATER 2 G: 2 G/20 ML SYRINGE (ML) INTRAVENOUS at 12:17:00

## 2024-01-26 RX ADMIN — SODIUM CHLORIDE: 9 INJECTION, SOLUTION INTRAVENOUS at 12:09:00

## 2024-01-26 RX ADMIN — DEXAMETHASONE SODIUM PHOSPHATE 4 MG: 4 MG/ML VIAL (ML) INJECTION at 12:33:00

## 2024-01-26 RX ADMIN — PHENYLEPHRINE HCL 100 MCG: 10 MG/ML VIAL (ML) INJECTION at 12:26:00

## 2024-01-26 RX ADMIN — LIDOCAINE HYDROCHLORIDE 4 ML: 40 INJECTION, SOLUTION RETROBULBAR; TOPICAL at 12:13:00

## 2024-01-26 RX ADMIN — ROCURONIUM BROMIDE 50 MG: 10 INJECTION, SOLUTION INTRAVENOUS at 12:13:00

## 2024-01-26 RX ADMIN — PHENYLEPHRINE HCL 100 MCG: 10 MG/ML VIAL (ML) INJECTION at 12:29:00

## 2024-01-26 NOTE — INTERVAL H&P NOTE
Pre-op Diagnosis: ESRD on dialysis (HCC) [N18.6, Z99.2]    The above referenced H&P was reviewed by Julio Cesar Reyes MD on 1/26/2024, the patient was examined and no significant changes have occurred in the patient's condition since the H&P was performed.  I discussed with the patient and/or legal representative the potential benefits, risks and side effects of this procedure; the likelihood of the patient achieving goals; and potential problems that might occur during recuperation.  I discussed reasonable alternatives to the procedure, including risks, benefits and side effects related to the alternatives and risks related to not receiving this procedure.  We will proceed with procedure as planned.

## 2024-01-26 NOTE — ANESTHESIA POSTPROCEDURE EVALUATION
[facility] Patient Status:  Hospital Outpatient Surgery   Age/Gender 78 year old male MRN [id number]   Location [facility] SURGERY Attending Julio Cesar Reyes MD   Hosp Day # 0 PCP Anoop Keys MD       Anesthesia Post-op Note    LAPAROSCOPIC PERITONEAL DIALYSIS CATHETER PLACEMENT    Procedure Summary       Date: 01/26/24 Room / Location:  MAIN OR 11 / [facility] MAIN OR    Anesthesia Start: 1208 Anesthesia Stop: 1321    Procedure: LAPAROSCOPIC PERITONEAL DIALYSIS CATHETER PLACEMENT (Abdomen) Diagnosis:       ESRD on dialysis (HCC)      (ESRD on dialysis (HCC) [N18.6, Z99.2])    Surgeons: Julio Cesar Reyes MD Anesthesiologist: Bhaskar Escobar MD    Anesthesia Type: general ASA Status: 4            Anesthesia Type: general    Vitals Value Taken Time   /78 01/26/24 1326   Temp 97.8 01/26/24 1326   Pulse 73 01/26/24 1326   Resp 18 01/26/24 1326   SpO2 98% 01/26/24 1326       Patient Location: PACU    Anesthesia Type: general    Airway Patency: patent    Postop Pain Control: adequate    Mental Status: mildly sedated but able to meaningfully participate in the post-anesthesia evaluation    Nausea/Vomiting: none    Cardiopulmonary/Hydration status: stable euvolemic    Complications: no apparent anesthesia related complications    Postop vital signs: stable    Dental Exam: Unchanged from Preop    Patient to be discharged from PACU when criteria met.

## 2024-01-26 NOTE — ANESTHESIA PROCEDURE NOTES
Airway  Date/Time: 1/26/2024 12:15 PM  Urgency: elective      General Information and Staff    Patient location during procedure: OR  Anesthesiologist: Bhaskar Escobar MD  Resident/CRNA: Molina Fulton CRNA  Performed: CRNA   Performed by: Mloina Fulton CRNA  Authorized by: Bhaskar Escobar MD      Indications and Patient Condition  Indications for airway management: anesthesia  Sedation level: deep  Preoxygenated: yes  Patient position: sniffing  Mask difficulty assessment: 1 - vent by mask    Final Airway Details  Final airway type: endotracheal airway      Successful airway: ETT  Cuffed: yes   Successful intubation technique: direct laryngoscopy  Endotracheal tube insertion site: oral  Blade: Ramiro  Blade size: #4  ETT size (mm): 7.5    Cormack-Lehane Classification: grade IIA - partial view of glottis  Placement verified by: capnometry   Measured from: lips  ETT to lips (cm): 23  Number of attempts at approach: 1

## 2024-01-26 NOTE — ANESTHESIA PREPROCEDURE EVALUATION
PRE-OP EVALUATION    Patient Name: Nader Campbell    Admit Diagnosis: ESRD on dialysis (HCC) [N18.6, Z99.2]    Pre-op Diagnosis: ESRD on dialysis (HCC) [N18.6, Z99.2]    LAPAROSCOPIC PERITONEAL DIALYSIS CATHETER PLACEMENT    Anesthesia Procedure: LAPAROSCOPIC PERITONEAL DIALYSIS CATHETER PLACEMENT    Surgeon(s) and Role:     * Julio Cesar Reyes MD - Primary    Pre-op vitals reviewed.  Temp: 97.7 °F (36.5 °C)  Pulse: 83  Resp: 18  BP: 126/80  SpO2: 99 %  Body mass index is 26.05 kg/m².    Current medications reviewed.  Hospital Medications:   ceFAZolin (Ancef) 2 g in 20mL IV syringe premix  2 g Intravenous Once    [MAR Hold] fondaparinux (Arixtra) 2.5 MG/0.5ML injection 2.5 mg  2.5 mg Subcutaneous Daily    [MAR Hold] acetaminophen (Tylenol Extra Strength) tab 1,000 mg  1,000 mg Oral Once    sodium chloride 0.9% infusion   Intravenous Continuous       Outpatient Medications:     Medications Prior to Admission   Medication Sig Dispense Refill Last Dose    memantine 10 MG Oral Tab Take 1 tablet (10 mg total) by mouth daily.   1/25/2024 at 2100    risperiDONE 0.5 MG Oral Tab Take 1.5 tablets (0.75 mg total) by mouth nightly. May also take 0.5 tablet (0.25 mg total) daily as needed (agitation). 60 tablet 0 1/25/2024 at 2100    tacrolimus 1 MG Oral Cap Take 1 capsule (1 mg total) by mouth 2 (two) times daily. FOR HEART TRANSPLANT  PATIENT TAKES TACROLIMUS 1.5 MG IN THE AM AND 1 MG IN THE EVENING   1/25/2024    sevelamer carbonate 800 MG Oral Tab Take 1 tablet (800 mg total) by mouth 3 (three) times daily with meals.   1/25/2024    gabapentin 100 MG Oral Cap Take 1 capsule (100 mg total) by mouth 3 (three) times a week.   1/25/2024 at 1700    Nephro-Jaime 0.8 MG Oral Tab Take 1 tablet (0.8 mg total) by mouth daily.   1/25/2024 at 0800    tacrolimus 0.5 MG Oral Cap Take 1 capsule (0.5 mg total) by mouth 2 (two) times daily. FOR HEART TRANSPLANT  0 1/25/2024    donepezil 10 MG Oral Tab Take 1 tablet (10 mg total) by  mouth nightly. 90 tablet 3 1/25/2024 at 2000    hydrOXYzine 25 MG Oral Tab Take 1 tablet (25 mg total) by mouth every 8 (eight) hours as needed for Itching. 90 tablet 3 1/25/2024 at 0700    Pravastatin Sodium 20 MG Oral Tab Take 1 tablet (20 mg total) by mouth nightly.   1/24/2024    allopurinol 100 MG Oral Tab Take 0.5 tablets (50 mg total) by mouth daily.   1/25/2024 at 0800       Allergies: Amlodipine, Dalteparin, Lisinopril, Penicillins, and Oxycodone      Anesthesia Evaluation        Anesthetic Complications           GI/Hepatic/Renal      (+) GERD       (+) chronic renal disease and ESRD and hemodialysis                   Cardiovascular        Exercise tolerance: good     MET: >4      (+) hypertension     (+) CAD                (+) CHF                Endo/Other                                  Pulmonary                           Neuro/Psych                              ESRD on HD: usual schedule Tu/Thur/Sat; dialyzed yesterday    S/p heart transplant 2006    10/20o23 ECHO findings:  Conclusions:     1. Left ventricle: The cavity size was below normal. Wall thickness was normal. Systolic function was hyperdynamic. The estimated ejection fraction was 70-75%. Left ventricular diastolic function parameters were normal.   2. Left atrium: The atrium was mildly dilated. Appearance was consistent with transplant anatomy.   3. Right atrium: The atrium was mildly dilated. Appearance was consistent with transplant anatomy.   4. Aortic valve: The valve was trileaflet. The leaflets were mildly thickened and mildly calcified. Transvalvular velocity was minimally increased. The findings were consistent with incrased flow due to hyperdynamic left ventricle The mean systolic gradient was 12mm Hg.   5. Pulmonary arteries: Systolic pressure was within the normal range. The peak systolic pressure is 29mm Hg.   Impressions:  This study is compared with previous dated 10-04-22: No significant change since prior study.            Past Surgical History:   Procedure Laterality Date    ARTHROSCOPY OF JOINT UNLISTED Right     rotator cuff repair    AV FISTULA REVISION, OPEN      incorrect info per wife    COLONOSCOPY      HEART TRANSPLANT  2006    [facility]     KNEE REPLACEMENT SURGERY Left 2018    x2    OTHER SURGICAL HISTORY  2021    Cysto Dr. Delgado    ROTATOR CUFF REPAIR      SINUS SURGERY        TONSILLECTOMY      TOTAL KNEE REPLACEMENT Left     2 on this leg    UPPER GI ENDOSCOPY,EXAM       Social History     Socioeconomic History    Marital status:    Tobacco Use    Smoking status: Former     Types: Cigarettes     Quit date:      Years since quittin.0    Smokeless tobacco: Never   Vaping Use    Vaping Use: Never used   Substance and Sexual Activity    Alcohol use: Yes     Comment: 1 beer per day    Drug use: Not Currently    Sexual activity: Yes   Other Topics Concern    Caffeine Concern Yes     Comment: rarely    Exercise No     History   Drug Use Unknown     Available pre-op labs reviewed.  Lab Results   Component Value Date    WBC 4.0 2024    RBC 3.73 (L) 2024    HGB 11.3 (L) 2024    HCT 31.6 (L) 2024    MCV 84.7 2024    MCH 30.3 2024    MCHC 35.8 2024    RDW 14.4 2024    .0 (L) 2024     Lab Results   Component Value Date     2024    K 4.8 2024     2024    CO2 33.0 (H) 2024    BUN 45 (H) 2024    CREATSERUM 10.50 (H) 2024    GLU 95 2024    CA 8.8 2024            Airway      Mallampati: III  Mouth opening: >3 FB  TM distance: 4 - 6 cm  Neck ROM: full Cardiovascular    Cardiovascular exam normal.         Dental             Pulmonary    Pulmonary exam normal.                 Other findings  Dentition grossly normal.            ASA: 4   Plan: general  NPO status verified and patient meets guidelines.          Plan/risks discussed with: patient and spouse                Present on  Admission:  **None**

## 2024-01-26 NOTE — DISCHARGE INSTRUCTIONS
Home Care Instructions  Minor Surgery  Dr Julio Cesar Reyes    MEDICATIONS  For post-operative pain control the mediations are usually over the counter preparations such as Advil and Tylenol For severe pain the patient may overlap Advil with Tylenol The patient can do this by taking two Tylenol, then three hours later taking two Advil, then three hours later taking Tylenol again. Please ask your surgeon before resuming blood thinners such as aspirin, plavix or coumadin. All other home medications may be resumed as scheduled. Generally aspirin is avoided for ten days.    DIET  The patient may resume a general diet immediately. There should be no alcohol consumption in the immediate recover time period. If the patient was sedated for the procedure the first meal should be light.    WOUND CARE  The top dressing may be removed the day after surgery. This includes the gauze, tape and band-aids if they are present. Do not remove the steri-strips or butterfly tapes that are white and adherent to the skin. The steri-strips will eventually peel up at the ends and at this point they may be removed. This is usually seven to ten days after surgery. The patient may shower the day after surgery. There is no need to cover the incisions and all top gauze type dressing should be removed prior to showering. Soap can get on the wounds but do not scrub over the wounds. No hair dye or chemicals of any kind should get in the wounds. Avoid tub baths for two weeks. Most wounds will be closed with dissolving suture underneath the skin. These sutures will dissolve on their own. The Doctor or nurse will remove any visible sutures, or staples, in the office.    ACTIVITY  The patient may ride in a car but should not drive the car for at least overnight if sedation was used. If no sedation was used the patient may drive immediately. The patient may return to work the next day. Avoid any activity that could lead to the wound getting elbowed or  bumped. Avoid bending, pushing, pulling and lifting anything heavier than 25 pounds for two weeks. Patients should seek further activity limits at the time of their appointment. No extreme sports for two weeks    APPOINTMENT  Please call our office today for an appointment within five to ten days of discharge Any fever greater than 100.5, chills, nausea, vomiting, or severe diarrhea please call our office. If the wound turns red, hot, swollen, becomes increasingly painful, or drains pus call us immediately at [phone]. For life threatening emergencies call 911. For non-emergent care please call our office after 9:30 a.m. Monday through Saturday. The number listed above is our office number, our phone automatically switches to out answering service if we are not there. Please do not use the emergency room for nonurgent care.      Thank you for entrusting me with your care

## 2024-01-26 NOTE — BRIEF OP NOTE
Pre-Operative Diagnosis: ESRD on dialysis (HCC) [N18.6, Z99.2]     Post-Operative Diagnosis: ESRD on dialysis (HCC) [N18.6, Z99.2]      Procedure Performed:   LAPAROSCOPIC PERITONEAL DIALYSIS CATHETER PLACEMENT    Surgeon(s) and Role:     * Julio Cesar Reyes MD - Primary    Assistant(s):  PA: Suzanne Sousa PA     Surgical Findings: good placement with easy inflow and outflow     Specimen: none     Estimated Blood Loss: Blood Output: 2 mL (1/26/2024 12:52 PM)      Dictation Number:      Julio Cesar Reyes MD  1/26/2024  1:07 PM

## 2024-01-27 ENCOUNTER — HOSPITAL ENCOUNTER (EMERGENCY)
Facility: HOSPITAL | Age: 79
Discharge: HOME | End: 2024-01-27
Attending: EMERGENCY MEDICINE
Payer: MEDICARE

## 2024-01-27 VITALS
TEMPERATURE: 98 F | OXYGEN SATURATION: 99 % | HEART RATE: 86 BPM | RESPIRATION RATE: 22 BRPM | SYSTOLIC BLOOD PRESSURE: 119 MMHG | DIASTOLIC BLOOD PRESSURE: 72 MMHG

## 2024-01-27 DIAGNOSIS — T85.691A: Primary | ICD-10-CM

## 2024-01-27 LAB
ANION GAP SERPL CALC-SCNC: 8 MMOL/L (ref 0–18)
BASOPHILS # BLD AUTO: 0.01 X10(3) UL (ref 0–0.2)
BASOPHILS NFR BLD AUTO: 0.2 %
BUN BLD-MCNC: 57 MG/DL (ref 9–23)
CALCIUM BLD-MCNC: 8.7 MG/DL (ref 8.5–10.1)
CHLORIDE SERPL-SCNC: 96 MMOL/L (ref 98–112)
CO2 SERPL-SCNC: 32 MMOL/L (ref 21–32)
CREAT BLD-MCNC: 12.3 MG/DL
EGFRCR SERPLBLD CKD-EPI 2021: 4 ML/MIN/1.73M2 (ref 60–?)
EOSINOPHIL # BLD AUTO: 0.01 X10(3) UL (ref 0–0.7)
EOSINOPHIL NFR BLD AUTO: 0.2 %
ERYTHROCYTE [DISTWIDTH] IN BLOOD BY AUTOMATED COUNT: 14.1 %
GLUCOSE BLD-MCNC: 137 MG/DL (ref 70–99)
HCT VFR BLD AUTO: 31 %
HGB BLD-MCNC: 11 G/DL
IMM GRANULOCYTES # BLD AUTO: 0.02 X10(3) UL (ref 0–1)
IMM GRANULOCYTES NFR BLD: 0.4 %
LYMPHOCYTES # BLD AUTO: 0.6 X10(3) UL (ref 1–4)
LYMPHOCYTES NFR BLD AUTO: 13.2 %
MCH RBC QN AUTO: 30.3 PG (ref 26–34)
MCHC RBC AUTO-ENTMCNC: 35.5 G/DL (ref 31–37)
MCV RBC AUTO: 85.4 FL
MONOCYTES # BLD AUTO: 0.41 X10(3) UL (ref 0.1–1)
MONOCYTES NFR BLD AUTO: 9 %
NEUTROPHILS # BLD AUTO: 3.49 X10 (3) UL (ref 1.5–7.7)
NEUTROPHILS # BLD AUTO: 3.49 X10(3) UL (ref 1.5–7.7)
NEUTROPHILS NFR BLD AUTO: 77 %
OSMOLALITY SERPL CALC.SUM OF ELEC: 300 MOSM/KG (ref 275–295)
PLATELET # BLD AUTO: 120 10(3)UL (ref 150–450)
POTASSIUM SERPL-SCNC: 4.9 MMOL/L (ref 3.5–5.1)
RBC # BLD AUTO: 3.63 X10(6)UL
SODIUM SERPL-SCNC: 136 MMOL/L (ref 136–145)
WBC # BLD AUTO: 4.5 X10(3) UL (ref 4–11)

## 2024-01-27 PROCEDURE — 99283 EMERGENCY DEPT VISIT LOW MDM: CPT

## 2024-01-27 PROCEDURE — 85025 COMPLETE CBC W/AUTO DIFF WBC: CPT | Performed by: EMERGENCY MEDICINE

## 2024-01-27 PROCEDURE — 80048 BASIC METABOLIC PNL TOTAL CA: CPT | Performed by: EMERGENCY MEDICINE

## 2024-01-27 PROCEDURE — 36415 COLL VENOUS BLD VENIPUNCTURE: CPT

## 2024-01-27 NOTE — OPERATIVE REPORT
[facility]    PATIENT'S NAME: MARJORIE ANNE   ATTENDING PHYSICIAN: Julio Cesar Reyes M.D.   OPERATING PHYSICIAN: Julio Cesar Reyes M.D.   PATIENT ACCOUNT#:   [id number]    LOCATION:  [facility] 10  MEDICAL RECORD #:   [id number]       YOB: 1945  ADMISSION DATE:       01/26/2024      OPERATION DATE:  01/26/2024    OPERATIVE REPORT    PREOPERATIVE DIAGNOSIS:  End-stage renal disease requiring dialysis.  POSTOPERATIVE DIAGNOSIS:  End-stage renal disease requiring dialysis.  PROCEDURE:  Laparoscopic-assisted peritoneal dialysis catheter placement.    ASSISTANT:  Suzanne Sousa PA-C.    ANESTHESIA:  General endotracheal anesthesia.    ANESTHESIOLOGIST:  Molina Fulton CRNA.    BLOOD LOSS:  Less than 2 mL.    COMPLICATIONS:  None apparent.    SPECIMENS:  None.    DISPOSITION:  The patient was awakened from anesthesia and brought to the recovery room in stable condition.  He tolerated the procedure without apparent complication.    Needle, sponge, and instrument counts were correct at the end the procedure.  Preprocedure antibiotic and DVT prophylaxis was administered per protocol, and time-out was performed prior to initiating procedure, identifying the correct patient, procedure, surgeon.    FINDINGS:  There is appropriate placement of the catheter under laparoscopic vision.  There is easy inflow and outflow of saline.    OPERATIVE TECHNIQUE:  The patient was brought to the operating room, and after induction of general endotracheal anesthesia, the abdomen was prepped and draped in the usual sterile fashion.  A supraumbilical incision is created through which a Veress needle is introduced and pneumoperitoneum is established in the usual manner.  A 5 mm Visiport entry trocar is used to gain access to the abdominal cavity.  Surveillance of the abdominal cavity reveals no abdominal adhesions.  Next, in the left lateral abdomen over the rectus musculature, a 5 mm incision is  created and a percutaneous sheath dilator is then placed through the skin incision and tunneled inferiorly and then into the peritoneum in a \"Z-type fashion\" to gain entry to the peritoneum.  Next, the Luer introducer is removed, and through the percutaneous sheath introducer, dialysis catheter is introduced into the abdomen under direct vision and directed towards the pelvis and just under the symphysis pubis.  Once the catheter is in appropriate position and the inner cuff is in subcutaneous position, the peel-away sheath is removed and discarded.  Next, the external portion of the catheter is tunneled laterally approximately 5 cm from the original entry site with the external cuff sitting just beneath the skin.  At this point, the lower lock attachments are secured to the catheter.  The saline is instilled under direct vision with easy inflow and immediate easy outflow when inspected.  The Luer lock is then secured closed.  Pneumoperitoneum is released, and trocars are removed under direct vision.  Laparoscopic incision was then injected with local anesthetic, and the incisions were closed using subcuticular 4-0 Monocryl.  Sterile occlusive dressing was placed at the catheter exit site.  The surgical wounds are closed using Dermabond.  The patient was then awakened from anesthesia and brought to the recovery room in stable condition.  He tolerated the procedure without apparent complication.  Needle, sponge, and instrument counts were correct at the end of the procedure.  Operative findings were discussed with the patient's family immediately upon conclusion of surgery.    Dictated By Julio Cesar Reyes M.D.  d: 01/26/2024 13:12:30  t: 01/26/2024 15:21:30  Job [id number]  PP/

## 2024-01-27 NOTE — ED PROVIDER NOTES
Patient Seen in: [facility] Emergency Department      History     Chief Complaint   Patient presents with    Cath Tube Problem     Stated Complaint: PERITONEAL DIALYSIS CATHETER. Catheter packing has come out, actively bleeding.*    Subjective:   HPI  78-year-old male who has past medical history of CKD CHF who had a peritoneal dialysis catheter placed by Dr. Reyes 1/26/2024 who presents ED with complaints of bleeding from catheter site.  Patient's wife reports the patient has a history of dementia and was tugging on the catheter.  She noticed bleeding around the catheter site tonight.  She reports that the bleeding has soaked through multiple changes of gauze which is what prompted her to come to the ER.  Patient denies any symptoms at this time denies any abdominal pain.      Objective:   Past Medical History:   Diagnosis Date    Back problem     BPH (benign prostatic hyperplasia)     Cataract     CKD (chronic kidney disease)     Congestive heart disease (HCC)     before transplant in 2006    Dialysis patient (Formerly [facility]) 07/27/2023    HEMODIALYSIS TUES/THURS/SAT 3.5HRS    Esophageal reflux     Fistula     RIGHT ARM    Glaucoma     Gout     Hearing impairment     HA's x2, doesnt wear    Heart transplanted ([facility]) 2006 [facility]-@ [facility]    High blood pressure     High cholesterol     Hypercholesterolemia     Muscle weakness     uses a cane- balance issues    Obstructive sleep apnea syndrome 02/04/2009    Problems with swallowing     Renal disorder     CKD    Short-term memory loss     Shortness of breath     Sleep apnea     had UPPP did not need device after    Visual impairment     glasses              Past Surgical History:   Procedure Laterality Date    ARTHROSCOPY OF JOINT UNLISTED Right     rotator cuff repair    AV FISTULA REVISION, OPEN      incorrect info per wife    COLONOSCOPY      HEART TRANSPLANT  2006    [facility]     KNEE REPLACEMENT SURGERY Left 2018    x2    OTHER SURGICAL HISTORY   2021    [facility]    ROTATOR CUFF REPAIR      SINUS SURGERY        TONSILLECTOMY      TOTAL KNEE REPLACEMENT Left     2 on this leg    UPPER GI ENDOSCOPY,EXAM                  Social History     Socioeconomic History    Marital status:    Tobacco Use    Smoking status: Former     Types: Cigarettes     Quit date:      Years since quittin.0    Smokeless tobacco: Never   Vaping Use    Vaping Use: Never used   Substance and Sexual Activity    Alcohol use: Yes     Comment: 1 beer per day    Drug use: Not Currently    Sexual activity: Yes   Other Topics Concern    Caffeine Concern Yes     Comment: rarely    Exercise No     Social Determinants of Health     Food Insecurity: No Food Insecurity (11/3/2023)    Food Insecurity     Food Insecurity: Never true   Transportation Needs: No Transportation Needs (11/3/2023)    Transportation Needs     Lack of Transportation: No   Housing Stability: Low Risk  (11/3/2023)    Housing Stability     Housing Instability: No              Review of Systems    Positive for stated complaint: PERITONEAL DIALYSIS CATHETER. Catheter packing has come out, actively bleeding.*  Other systems are as noted in HPI.  Constitutional and vital signs reviewed.      All other systems reviewed and negative except as noted above.    Physical Exam     ED Triage Vitals [24 0249]   /74   Pulse 78   Resp 22   Temp 98.2 °F (36.8 °C)   Temp src Oral   SpO2 96 %   O2 Device None (Room air)       Current:/72   Pulse 86   Temp 98.2 °F (36.8 °C) (Oral)   Resp 22   SpO2 99%         Physical Exam  Vitals and nursing note reviewed.   Constitutional:       Appearance: He is well-developed.   HENT:      Head: Normocephalic and atraumatic.   Eyes:      Pupils: Pupils are equal, round, and reactive to light.   Cardiovascular:      Rate and Rhythm: Normal rate and regular rhythm.   Pulmonary:      Effort: Pulmonary effort is normal.      Breath sounds: Normal breath sounds.    Abdominal:      General: Bowel sounds are normal.      Palpations: Abdomen is soft.      Comments: Left anterior peritoneal dialysis catheter site no active bleeding however gauze is completely soaked with blood.  There are no overlying sutures around the catheter site.   Musculoskeletal:         General: No deformity.      Cervical back: Normal range of motion and neck supple.   Skin:     General: Skin is warm and dry.      Capillary Refill: Capillary refill takes less than 2 seconds.   Neurological:      Mental Status: He is alert and oriented to person, place, and time.               ED Course     Labs Reviewed   BASIC METABOLIC PANEL (8) - Abnormal; Notable for the following components:       Result Value    Glucose 137 (*)     Chloride 96 (*)     BUN 57 (*)     Creatinine 12.30 (*)     Calculated Osmolality 300 (*)     eGFR-Cr 4 (*)     All other components within normal limits   CBC W/ DIFFERENTIAL - Abnormal; Notable for the following components:    RBC 3.63 (*)     HGB 11.0 (*)     HCT 31.0 (*)     .0 (*)     Lymphocyte Absolute 0.60 (*)     All other components within normal limits   CBC WITH DIFFERENTIAL WITH PLATELET    Narrative:     The following orders were created for panel order CBC With Differential With Platelet.  Procedure                               Abnormality         Status                     ---------                               -----------         ------                     CBC W/ DIFFERENTIAL[[id number]]          Abnormal            Final result                 Please view results for these tests on the individual orders.   RAINBOW DRAW BLUE   RAINBOW DRAW GOLD                          MDM      This is a 78-year-old male who presents ED with complaints of bleeding from his catheter site.  There is no active bleeding at this time however the gauze overlying the catheter site is soaked in blood.  New gauze was applied and basic labs were obtained CBC and electrolytes are  essentially at baseline.  Case was discussed with Dr. Up who is on-call for Dr. Reyes.  She recommends seeing Dr. Reyes on Monday for further evaluation of the catheter.  Bandage was reapplied to the catheter site and Ace wrap was applied overlying the abdomen to deter patient from pulling on the catheter as he was earlier.  Wound care was discussed with patient's wife who feels comfortable with the plan and following up with Dr. Reyes on Monday for further evaluation of the catheter.  Patient was discharged home in stable condition.                                   Medical Decision Making      Disposition and Plan     Clinical Impression:  1. Mechanical complication of peritoneal dialysis catheter, initial encounter ([facility])         Disposition:  Discharge  1/27/2024  4:00 am    Follow-up:  Julio Cesar Reyes MD  1946 [facility] [address]  [phone]    Call in 1 day(s)            Medications Prescribed:  Discharge Medication List as of 1/27/2024  4:04 AM

## 2024-01-27 NOTE — DISCHARGE INSTRUCTIONS
Please keep the dressing overlying the catheter.  Please follow-up with Dr. Reyes on Monday to have the catheter exchanged.

## 2024-01-30 ENCOUNTER — OFFICE VISIT (OUTPATIENT)
Facility: LOCATION | Age: 79
End: 2024-01-30
Payer: MEDICARE

## 2024-01-30 VITALS — TEMPERATURE: 98 F | HEART RATE: 99 BPM

## 2024-01-30 DIAGNOSIS — N18.6 ESRD ON DIALYSIS (HCC): Primary | ICD-10-CM

## 2024-01-30 DIAGNOSIS — Z99.2 ESRD ON DIALYSIS (HCC): Primary | ICD-10-CM

## 2024-01-30 PROCEDURE — 99024 POSTOP FOLLOW-UP VISIT: CPT | Performed by: SURGERY

## 2024-01-30 PROCEDURE — 1160F RVW MEDS BY RX/DR IN RCRD: CPT | Performed by: SURGERY

## 2024-01-30 PROCEDURE — 1159F MED LIST DOCD IN RCRD: CPT | Performed by: SURGERY

## 2024-01-30 NOTE — PROGRESS NOTES
Post Operative Visit Note       Active Problems  1. ESRD on dialysis ([facility])         Chief Complaint   Chief Complaint   Patient presents with    Post-Op     PO - LAPAROSCOPIC PERITONEAL DIALYSIS CATHETER PLACEMENT 1/26, patient tried to pull out catheter, Mechanical complication of peritoneal dialysis catheter, initial encounter 1/27, just want t make sure the catheter is not damage or infected, no other symptoms.          History of Present Illness     Patient presents for follow-up after recent laparoscopic peritoneal dialysis catheter placement.  Per the wife, the patient became confused and pulled on the catheter withdrawing it slightly.  They noticed ecchymosis at the insertion site.  Patient was evaluated in the emergency room and discharged shortly thereafter.  Patient now presents for evaluation.    The patient denies fever, chills, chest pain, shortness of breath, dyspnea. The patient also denies hematemesis, melena, or hematochezia. The patient denies change in bowel or bladder habits. There is no complaint of hematuria or dysuria.    Allergies  Nader is allergic to amlodipine, dalteparin, lisinopril, penicillins, and oxycodone.    Past Medical / Surgical / Social / Family History    The past medical and past surgical history have been reviewed by me today.     Past Medical History:   Diagnosis Date    Back problem     BPH (benign prostatic hyperplasia)     Cataract     CKD (chronic kidney disease)     Congestive heart disease (HCC)     before transplant in 2006    Dialysis patient ([facility]) 07/27/2023    HEMODIALYSIS TUES/THURS/SAT 3.5HRS    Esophageal reflux     Fistula     RIGHT ARM    Glaucoma     Gout     Hearing impairment     HA's x2, doesnt wear    Heart transplanted ([facility]) 2006 2006-@ [facility]    High blood pressure     High cholesterol     Hypercholesterolemia     Muscle weakness     uses a cane- balance issues    Obstructive sleep apnea syndrome 02/04/2009    Problems with swallowing      Renal disorder     CKD    Short-term memory loss     Shortness of breath     Sleep apnea     had UPPP did not need device after    Visual impairment     glasses     Past Surgical History:   Procedure Laterality Date    ARTHROSCOPY OF JOINT UNLISTED Right     rotator cuff repair    AV FISTULA REVISION, OPEN      incorrect info per wife    COLONOSCOPY      [facility]ART TRANSPLANT      [facility]     KNEE REPLACEMENT SURGERY Left 2018    x2    OTHER SURGICAL HISTORY  2021    Cysto Dr. Delgado    ROTATOR CUFF REPAIR      SINUS SURGERY        TONSILLECTOMY      TOTAL KNEE REPLACEMENT Left     2 on this leg    UPPER GI ENDOSCOPY,EXAM         The family history and social history have been reviewed by me today.    Family History   Problem Relation Age of Onset    Heart Disorder Father         heart attack    Heart Disorder Mother         CHF     Social History     Socioeconomic History    Marital status:    Tobacco Use    Smoking status: Former     Types: Cigarettes     Quit date:      Years since quittin.0    Smokeless tobacco: Never   Vaping Use    Vaping Use: Never used   Substance and Sexual Activity    Alcohol use: Yes     Comment: 1 beer per day    Drug use: Not Currently    Sexual activity: Yes   Other Topics Concern    Caffeine Concern Yes     Comment: rarely    Exercise No        Current Outpatient Medications:     senna-docusate 8.6-50 MG Oral Tab, Take 1 tablet by mouth daily., Disp: 30 tablet, Rfl: 0    HYDROcodone-acetaminophen 5-325 MG Oral Tab, Take 1-2 tablets by mouth every 6 (six) hours as needed for Pain., Disp: 10 tablet, Rfl: 0    memantine 10 MG Oral Tab, Take 1 tablet (10 mg total) by mouth daily., Disp: , Rfl:     risperiDONE 0.5 MG Oral Tab, Take 1.5 tablets (0.75 mg total) by mouth nightly. May also take 0.5 tablet (0.25 mg total) daily as needed (agitation)., Disp: 60 tablet, Rfl: 0    tacrolimus 1 MG Oral Cap, Take 1 capsule (1 mg total) by mouth 2 (two) times daily. FOR HEART  TRANSPLANT PATIENT TAKES TACROLIMUS 1.5 MG IN THE AM AND 1 MG IN THE EVENING, Disp: , Rfl:     sevelamer carbonate 800 MG Oral Tab, Take 1 tablet (800 mg total) by mouth 3 (three) times daily with meals., Disp: , Rfl:     gabapentin 100 MG Oral Cap, Take 1 capsule (100 mg total) by mouth 3 (three) times a week., Disp: , Rfl:     Nephro-Jaime 0.8 MG Oral Tab, Take 1 tablet (0.8 mg total) by mouth daily., Disp: , Rfl:     tacrolimus 0.5 MG Oral Cap, Take 1 capsule (0.5 mg total) by mouth 2 (two) times daily. FOR HEART TRANSPLANT, Disp: , Rfl: 0    donepezil 10 MG Oral Tab, Take 1 tablet (10 mg total) by mouth nightly., Disp: 90 tablet, Rfl: 3    hydrOXYzine 25 MG Oral Tab, Take 1 tablet (25 mg total) by mouth every 8 (eight) hours as needed for Itching., Disp: 90 tablet, Rfl: 3    Pravastatin Sodium 20 MG Oral Tab, Take 1 tablet (20 mg total) by mouth nightly., Disp: , Rfl:     allopurinol 100 MG Oral Tab, Take 0.5 tablets (50 mg total) by mouth daily., Disp: , Rfl:       Review of Systems  The Review of Systems has been reviewed by me during today.  Review of Systems   Constitutional: Negative.    HENT: Negative.     Eyes: Negative.    Respiratory: Negative.     Cardiovascular: Negative.    Gastrointestinal: Negative.    Genitourinary: Negative.    Musculoskeletal: Negative.    Skin: Negative.    Neurological: Negative.    Psychiatric/Behavioral: Negative.         Physical Findings   Pulse 99   Temp 97.9 °F (36.6 °C) (Temporal)   Physical Exam  Constitutional:       Appearance: He is well-developed.   Cardiovascular:      Rate and Rhythm: Normal rate and regular rhythm.      Heart sounds: Normal heart sounds.   Pulmonary:      Effort: Pulmonary effort is normal.      Breath sounds: Normal breath sounds.   Abdominal:      General: Bowel sounds are normal. There is no distension.      Palpations: Abdomen is soft. There is no mass.      Tenderness: There is no abdominal tenderness. There is no guarding or rebound.       Hernia: No hernia is present.       Skin:     General: Skin is warm and dry.   Neurological:      Mental Status: He is alert and oriented to person, place, and time.      Deep Tendon Reflexes: Reflexes are normal and symmetric.             Assessment   1. ESRD on dialysis (HCC)          Plan     The patient is recovering nicely following laparoscopic peritoneal dialysis catheter placement.    During a moment of confusion, the patient pulled on his catheter causing subcutaneous ecchymosis and slight hematoma.    Catheter is flushed easily with sterile saline solution in the office today.  There is immediate return of saline.  No obstruction to the catheter.    The anticipated postoperative recovery was discussed with the patient in detail.    Dietary, activity, and exercise recommendations along with restrictions were discussed with the patient during today's visit.    Wound care instructions were discussed during today's visit.    The patient will return to my attention on an as needed basis.    The patient is encouraged to continue seeing the primary care physician for ongoing medical needs.    The patient was given ample opportunity to ask questions.  The patient's questions were answered in detail and to the patient's satisfaction.  The patient voiced understanding of the postoperative care plan.           No orders of the defined types were placed in this encounter.      Imaging & Referrals   None    Follow Up  No follow-ups on file.    Julio Cesar Reyes MD

## 2024-01-31 ENCOUNTER — HOSPITAL ENCOUNTER (INPATIENT)
Facility: HOSPITAL | Age: 79
LOS: 2 days | Discharge: HOME HEALTH | End: 2024-02-02
Attending: EMERGENCY MEDICINE | Admitting: INTERNAL MEDICINE
Payer: MEDICARE

## 2024-01-31 ENCOUNTER — APPOINTMENT (OUTPATIENT)
Dept: CT IMAGING | Facility: HOSPITAL | Age: 79
End: 2024-01-31
Attending: EMERGENCY MEDICINE
Payer: MEDICARE

## 2024-01-31 ENCOUNTER — APPOINTMENT (OUTPATIENT)
Dept: GENERAL RADIOLOGY | Facility: HOSPITAL | Age: 79
End: 2024-01-31
Attending: EMERGENCY MEDICINE
Payer: MEDICARE

## 2024-01-31 ENCOUNTER — HOSPITAL ENCOUNTER (INPATIENT)
Facility: HOSPITAL | Age: 79
LOS: 2 days | Discharge: HOME | End: 2024-02-02
Attending: EMERGENCY MEDICINE | Admitting: INTERNAL MEDICINE
Payer: MEDICARE

## 2024-01-31 ENCOUNTER — APPOINTMENT (OUTPATIENT)
Dept: ULTRASOUND IMAGING | Facility: HOSPITAL | Age: 79
End: 2024-01-31
Attending: EMERGENCY MEDICINE
Payer: MEDICARE

## 2024-01-31 DIAGNOSIS — R53.1 WEAKNESS: ICD-10-CM

## 2024-01-31 DIAGNOSIS — N18.5 RENAL FAILURE, CHRONIC, STAGE 5 (HCC): ICD-10-CM

## 2024-01-31 DIAGNOSIS — E87.5 HYPERKALEMIA: Primary | ICD-10-CM

## 2024-01-31 LAB
ALBUMIN SERPL-MCNC: 3 G/DL (ref 3.4–5)
ALBUMIN/GLOB SERPL: 0.9 {RATIO} (ref 1–2)
ALP LIVER SERPL-CCNC: 159 U/L
ANION GAP SERPL CALC-SCNC: 12 MMOL/L (ref 0–18)
AST SERPL-CCNC: 9 U/L (ref 15–37)
BASOPHILS # BLD AUTO: 0.03 X10(3) UL (ref 0–0.2)
BASOPHILS NFR BLD AUTO: 0.7 %
BILIRUB SERPL-MCNC: 0.6 MG/DL (ref 0.1–2)
BUN BLD-MCNC: 104 MG/DL (ref 9–23)
CALCIUM BLD-MCNC: 7.5 MG/DL (ref 8.5–10.1)
CHLORIDE SERPL-SCNC: 105 MMOL/L (ref 98–112)
CO2 SERPL-SCNC: 22 MMOL/L (ref 21–32)
CREAT BLD-MCNC: 17.6 MG/DL
EGFRCR SERPLBLD CKD-EPI 2021: 2 ML/MIN/1.73M2 (ref 60–?)
EOSINOPHIL # BLD AUTO: 0.28 X10(3) UL (ref 0–0.7)
EOSINOPHIL NFR BLD AUTO: 7 %
ERYTHROCYTE [DISTWIDTH] IN BLOOD BY AUTOMATED COUNT: 13.8 %
FLUAV + FLUBV RNA SPEC NAA+PROBE: NEGATIVE
FLUAV + FLUBV RNA SPEC NAA+PROBE: NEGATIVE
GLOBULIN PLAS-MCNC: 3.4 G/DL (ref 2.8–4.4)
GLUCOSE BLD-MCNC: 108 MG/DL (ref 70–99)
GLUCOSE BLD-MCNC: 95 MG/DL (ref 70–99)
GLUCOSE BLD-MCNC: 97 MG/DL (ref 70–99)
HBV SURFACE AG SER-ACNC: <0.1 [IU]/L
HBV SURFACE AG SERPL QL IA: NONREACTIVE
HCT VFR BLD AUTO: 27.1 %
HGB BLD-MCNC: 9.6 G/DL
IMM GRANULOCYTES # BLD AUTO: 0.02 X10(3) UL (ref 0–1)
IMM GRANULOCYTES NFR BLD: 0.5 %
LYMPHOCYTES # BLD AUTO: 0.99 X10(3) UL (ref 1–4)
LYMPHOCYTES NFR BLD AUTO: 24.6 %
MCH RBC QN AUTO: 29.9 PG (ref 26–34)
MCHC RBC AUTO-ENTMCNC: 35.4 G/DL (ref 31–37)
MCV RBC AUTO: 84.4 FL
MONOCYTES # BLD AUTO: 0.47 X10(3) UL (ref 0.1–1)
MONOCYTES NFR BLD AUTO: 11.7 %
NEUTROPHILS # BLD AUTO: 2.23 X10 (3) UL (ref 1.5–7.7)
NEUTROPHILS # BLD AUTO: 2.23 X10(3) UL (ref 1.5–7.7)
NEUTROPHILS NFR BLD AUTO: 55.5 %
OSMOLALITY SERPL CALC.SUM OF ELEC: 320 MOSM/KG (ref 275–295)
PLATELET # BLD AUTO: 96 10(3)UL (ref 150–450)
POTASSIUM SERPL-SCNC: 6.2 MMOL/L (ref 3.5–5.1)
PROT SERPL-MCNC: 6.4 G/DL (ref 6.4–8.2)
Q-T INTERVAL: 456 MS
QRS DURATION: 156 MS
QTC CALCULATION (BEZET): 560 MS
R AXIS: 100 DEGREES
RBC # BLD AUTO: 3.21 X10(6)UL
RSV RNA SPEC NAA+PROBE: NEGATIVE
SARS-COV-2 RNA RESP QL NAA+PROBE: NOT DETECTED
SODIUM SERPL-SCNC: 139 MMOL/L (ref 136–145)
T AXIS: -24 DEGREES
TROPONIN I SERPL HS-MCNC: 18 NG/L
VENTRICULAR RATE: 91 BPM
WBC # BLD AUTO: 4 X10(3) UL (ref 4–11)

## 2024-01-31 PROCEDURE — 70450 CT HEAD/BRAIN W/O DYE: CPT | Performed by: EMERGENCY MEDICINE

## 2024-01-31 PROCEDURE — 71045 X-RAY EXAM CHEST 1 VIEW: CPT | Performed by: EMERGENCY MEDICINE

## 2024-01-31 PROCEDURE — 93970 EXTREMITY STUDY: CPT | Performed by: EMERGENCY MEDICINE

## 2024-01-31 PROCEDURE — 5A1D70Z PERFORMANCE OF URINARY FILTRATION, INTERMITTENT, LESS THAN 6 HOURS PER DAY: ICD-10-PCS | Performed by: HOSPITALIST

## 2024-01-31 RX ORDER — TACROLIMUS 0.5 MG/1
0.5 CAPSULE ORAL DAILY
Status: DISCONTINUED | OUTPATIENT
Start: 2024-01-31 | End: 2024-02-02

## 2024-01-31 RX ORDER — DEXTROSE MONOHYDRATE 25 G/50ML
50 INJECTION, SOLUTION INTRAVENOUS ONCE
Status: DISCONTINUED | OUTPATIENT
Start: 2024-01-31 | End: 2024-01-31 | Stop reason: SDUPTHER

## 2024-01-31 RX ORDER — ONDANSETRON 2 MG/ML
4 INJECTION INTRAMUSCULAR; INTRAVENOUS EVERY 6 HOURS PRN
Status: DISCONTINUED | OUTPATIENT
Start: 2024-01-31 | End: 2024-01-31

## 2024-01-31 RX ORDER — DEXTROSE MONOHYDRATE 25 G/50ML
25 INJECTION, SOLUTION INTRAVENOUS
Status: ACTIVE | OUTPATIENT
Start: 2024-01-31 | End: 2024-01-31

## 2024-01-31 RX ORDER — TACROLIMUS 1 MG/1
1 CAPSULE ORAL 2 TIMES DAILY
Status: DISCONTINUED | OUTPATIENT
Start: 2024-01-31 | End: 2024-02-02

## 2024-01-31 RX ORDER — DEXTROSE MONOHYDRATE 25 G/50ML
INJECTION, SOLUTION INTRAVENOUS ONCE
Status: COMPLETED | OUTPATIENT
Start: 2024-01-31 | End: 2024-01-31

## 2024-01-31 RX ORDER — DONEPEZIL HYDROCHLORIDE 10 MG/1
10 TABLET, FILM COATED ORAL NIGHTLY
Status: DISCONTINUED | OUTPATIENT
Start: 2024-01-31 | End: 2024-02-02

## 2024-01-31 RX ORDER — MEMANTINE HYDROCHLORIDE 10 MG/1
10 TABLET ORAL DAILY
Status: DISCONTINUED | OUTPATIENT
Start: 2024-01-31 | End: 2024-02-02

## 2024-01-31 RX ORDER — METOCLOPRAMIDE HYDROCHLORIDE 5 MG/ML
5 INJECTION INTRAMUSCULAR; INTRAVENOUS EVERY 8 HOURS PRN
Status: DISCONTINUED | OUTPATIENT
Start: 2024-01-31 | End: 2024-02-01

## 2024-01-31 RX ORDER — ACETAMINOPHEN 500 MG
500 TABLET ORAL EVERY 4 HOURS PRN
Status: DISCONTINUED | OUTPATIENT
Start: 2024-01-31 | End: 2024-02-02

## 2024-01-31 RX ORDER — GABAPENTIN 100 MG/1
100 CAPSULE ORAL
Status: DISCONTINUED | OUTPATIENT
Start: 2024-01-31 | End: 2024-02-02

## 2024-01-31 RX ORDER — RISPERIDONE 0.25 MG/1
0.75 TABLET ORAL NIGHTLY
Status: DISCONTINUED | OUTPATIENT
Start: 2024-01-31 | End: 2024-02-02

## 2024-01-31 RX ORDER — ALLOPURINOL 100 MG/1
50 TABLET ORAL DAILY
Status: DISCONTINUED | OUTPATIENT
Start: 2024-01-31 | End: 2024-02-02

## 2024-01-31 RX ORDER — MELATONIN
3 NIGHTLY PRN
Status: DISCONTINUED | OUTPATIENT
Start: 2024-01-31 | End: 2024-02-02

## 2024-01-31 RX ORDER — HEPARIN SODIUM 5000 [USP'U]/ML
5000 INJECTION, SOLUTION INTRAVENOUS; SUBCUTANEOUS EVERY 8 HOURS SCHEDULED
Status: DISCONTINUED | OUTPATIENT
Start: 2024-01-31 | End: 2024-01-31

## 2024-01-31 RX ORDER — CALCIUM GLUCONATE 10 MG/ML
1 INJECTION, SOLUTION INTRAVENOUS ONCE
Status: COMPLETED | OUTPATIENT
Start: 2024-01-31 | End: 2024-01-31

## 2024-01-31 RX ORDER — INSULIN ASPART 100 [IU]/ML
6 INJECTION, SOLUTION INTRAVENOUS; SUBCUTANEOUS ONCE
Status: DISCONTINUED | OUTPATIENT
Start: 2024-01-31 | End: 2024-01-31 | Stop reason: ALTCHOICE

## 2024-01-31 NOTE — PLAN OF CARE
Assumed pt care from ED at ~1540. A&Ox1, forgetful & impulsive at times. Per wife, pt's mental status fluctuates day to day. VSS. Room air, . NSR on tele. Notified [facility] of stat HD treatment. LUE extended PIV SL. R arm precautions d/t AVF. LLQ PD catheter. Denies pain, denies N/V. Heparin subcutaneous for VTE prevention. Per pt's wife, pt is anuric. PT/OT to eval. Renal diet. Pt & pt's wife updated on POC.     Problem: Patient/Family Goals  Goal: Patient/Family Long Term Goal  Description: Patient's Long Term Goal: discharge  Interventions:  - PT/OT  - complete HD  - See additional Care Plan goals for specific interventions  Outcome: Progressing  Goal: Patient/Family Short Term Goal  Description: Patient's Short Term Goal: regain strength   Interventions:   - PT/OT  - See additional Care Plan goals for specific interventions  Outcome: Progressing

## 2024-01-31 NOTE — ED PROVIDER NOTES
Patient Seen in: [facility] Emergency Department      History     Chief Complaint   Patient presents with    Weakness     Stated Complaint: leg weakness, hx dementia    Subjective:   HPI    Patient with a history of chronic kidney disease and heart failure.  Patient had a peritoneal dialysis catheter placed earlier this month 1/26 and he was to the emergency department just 4 days ago with bleeding at the site.  Patient's wife reported that patient has a history of dementia and may have been tugging at the catheter.  Bleeding was controlled and patient referred back to his surgeon.  Patient was seen in the office yesterday.  Some ecchymosis and hematoma were noted but otherwise patient seem to be doing well.  Wife called his providers nurse reporting that patient was so weak he cannot lift himself off the toilet today.  She was able to eventually help him up and get him to bed.  He was reporting soreness in his legs.  His speech seemed a little slurred.  Wife reported to our nursing staff that patient would start talking and seemed to \"zone out \".  It is noted patient missed his last 2 dialysis sessions.  He has dialysis Tuesday, Thursday, and Saturday.  He is due for dialysis tomorrow  Wife notes that he coughs chronically.  No change in his cough.  No high fever.  No vomiting.  Wife notes that both of his legs are sore to touch  Patient does not make urine        Objective:   Past Medical History:   Diagnosis Date    Back problem     BPH (benign prostatic hyperplasia)     Cataract     CKD (chronic kidney disease)     Congestive heart disease (HCC)     before transplant in 2006    Dialysis patient ([facility]) 07/27/2023    HEMODIALYSIS TUES/THURS/SAT 3.5HRS    Esophageal reflux     Fistula     RIGHT ARM    Glaucoma     Gout     Hearing impairment     HA's x2, doesnt wear    Heart transplanted ([facility]) 2006 2006-@ [facility]    High blood pressure     High cholesterol     Hypercholesterolemia     Muscle  weakness     uses a cane- balance issues    Obstructive sleep apnea syndrome 2009    Problems with swallowing     Renal disorder     CKD    Short-term memory loss     Shortness of breath     Sleep apnea     had UPPP did not need device after    Visual impairment     glasses              Past Surgical History:   Procedure Laterality Date    ARTHROSCOPY OF JOINT UNLISTED Right     rotator cuff repair    AV FISTULA REVISION, OPEN      incorrect info per wife    COLONOSCOPY      HEART TRANSPLANT      [facility]     KNEE REPLACEMENT SURGERY Left 2018    x2    OTHER SURGICAL HISTORY  2021    Cysto Dr. Delgado    ROTATOR CUFF REPAIR      SINUS SURGERY        TONSILLECTOMY      TOTAL KNEE REPLACEMENT Left     2 on this leg    UPPER GI ENDOSCOPY,EXAM                  Social History     Socioeconomic History    Marital status:    Tobacco Use    Smoking status: Former     Types: Cigarettes     Quit date:      Years since quittin.0    Smokeless tobacco: Never   Vaping Use    Vaping Use: Never used   Substance and Sexual Activity    Alcohol use: Yes     Comment: 1 beer per day    Drug use: Not Currently    Sexual activity: Yes   Other Topics Concern    Caffeine Concern Yes     Comment: rarely    Exercise No     Social Determinants of Health     Food Insecurity: No Food Insecurity (11/3/2023)    Food Insecurity     Food Insecurity: Never true   Transportation Needs: No Transportation Needs (11/3/2023)    Transportation Needs     Lack of Transportation: No   Housing Stability: Low Risk  (11/3/2023)    Housing Stability     Housing Instability: No              Review of Systems    Positive for stated complaint: leg weakness, hx dementia  Other systems are as noted in HPI.  Constitutional and vital signs reviewed.      All other systems reviewed and negative except as noted above.    Physical Exam     ED Triage Vitals [24 1047]   /88   Pulse 94   Resp 18   Temp 98.5 °F (36.9 °C)   Temp src  Temporal   SpO2 94 %   O2 Device None (Room air)       Current:BP (!) 132/96   Pulse 88   Temp 98.5 °F (36.9 °C) (Temporal)   Resp 11   Ht 180.3 cm (5' 11\")   Wt 83.9 kg   SpO2 100%   BMI 25.80 kg/m²         Physical Exam  General: The patient is awake and alert.  He will follow simple commands.  There is facial symmetry.  Eyes: sclera white, conjunctiva pink and moist.  Lids and lashes are normal.  Pupils are constricted but round and extraocular movements are intact  Throat: Posterior pharynx is normal.  Tongue protrudes midline  Neck: With no extraordinary JVD  Lungs: Diminished to auscultation bilaterally.  No rhonchi or rales.  Heart: Normal S1 and S2, without murmur.  Distal pulses are strong and symmetric.  Abdomen: Soft, nondistended.  Very mildly tender diffusely.  Peritoneal catheter noted in the left abdomen site appears nonerythematous  Extremities: Unremarkable.  Calves slightly swollen with trace to 1+ edema and mildly diffusely tender  Neurologic:  Mental status as above.  Patient moves all extremities with reasonable strength.  He can show me 2 fingers.  He can lift his arms and legs off the cart without any assistant and even sit up on the cart without assistance.  Finger-nose coordination intact.         ED Course     Labs Reviewed   COMP METABOLIC PANEL (14) - Abnormal; Notable for the following components:       Result Value    Potassium 6.2 (*)      (*)     Creatinine 17.60 (*)     Calcium, Total 7.5 (*)     Calculated Osmolality 320 (*)     eGFR-Cr 2 (*)     AST 9 (*)     Alkaline Phosphatase 159 (*)     Albumin 3.0 (*)     A/G Ratio 0.9 (*)     All other components within normal limits   CBC W/ DIFFERENTIAL - Abnormal; Notable for the following components:    RBC 3.21 (*)     HGB 9.6 (*)     HCT 27.1 (*)     PLT 96.0 (*)     Lymphocyte Absolute 0.99 (*)     All other components within normal limits   TROPONIN I HIGH SENSITIVITY - Normal   SARS-COV-2/FLU A AND B/RSV BY PCR  (GENEXPERT) - Normal    Narrative:     This test is intended for the qualitative detection and differentiation of SARS-CoV-2, influenza A, influenza B, and respiratory syncytial virus (RSV) viral RNA in nasopharyngeal or nares swabs from individuals suspected of respiratory viral infection consistent with COVID-19 by their healthcare provider. Signs and symptoms of respiratory viral infection due to SARS-CoV-2, influenza, and RSV can be similar.    Test performed using the Xpert Xpress SARS-CoV-2/FLU/RSV (real time RT-PCR)  assay on the Gene[employer]pert instrument, [employer], [place] [address].   This test is being used under the Food and Drug Administration's Emergency Use Authorization.    The authorized Fact Sheet for Healthcare Providers for this assay is available upon request from the laboratory.   CBC WITH DIFFERENTIAL WITH PLATELET    Narrative:     The following orders were created for panel order CBC With Differential With Platelet.  Procedure                               Abnormality         Status                     ---------                               -----------         ------                     CBC W/ DIFFERENTIAL[[id number]]          Abnormal            Final result                 Please view results for these tests on the individual orders.   SCAN SLIDE   URINALYSIS, ROUTINE   RAINBOW DRAW LAVENDER   RAINBOW DRAW LIGHT GREEN   RAINBOW DRAW BLUE   RAINBOW DRAW GOLD             Laboratory work 4 days ago showed:  near normal electrolytes.  Creatinine was 12.3  CBC showed normal white count.  Hemoglobin of 11.  Platelet count 120      An EKG was performed. I agree with computerized EKG interval interpretations. EKG shows wide-complex with flattened P waves and  right bundle branch block morphology and nonspecific change. There are no acute ST changes to suggest acute ischemia or infarct  Ventricular rate 91 bpm. MI interval . QRS duration 156 ms.         MDM      Patient with some alteration of awareness  and generalized weakness.  I suspect this is related to his missing dialysis for almost a week.  Metabolic abnormalities such as hyperkalemia also consideration.  Occult infectious etiologies such as pneumonia or even COVID infections are consideration as patient has had some cough.  Patient with no focal weakness at this point and my suspicion for CVA would be low.  Bilateral lower extremity tenderness in the setting of postoperative.  Suggest possibility of DVT and ultrasound imaging reasonable.  As noted above, abdominal examination with only mild diffuse tenderness.  No evidence of peritonitis    Patient was closely monitored.    Flu swab/COVID test negative  Troponin negative  Metabolic panel shows normal blood sugar.  Potassium is 6.2.  Creatinine markedly elevated 17  CBC shows normal white count.  Hemoglobin of 9.6 similar to baseline of about 10 or 11  Platelet 96 also similar to baseline ranging from     Chest x-ray  I personally reviewed the actual radiographs themselves and my individual interpretation shows some atelectasis or even early infiltrate on the left  radiologist's formal interpretation which I have reviewed  CONCLUSION:    1. Left basilar atelectasis.   2. Possible mild basilar pneumonia.     CT brain  CONCLUSION:  No acute intracranial pathology.     Bilateral venous Doppler  CONCLUSION:  No acute deep vein thrombosis.       Admission disposition: 1/31/2024  1:49 PM       I spoke with Dr. Shepherd, nephrology.  He will arrange for dialysis and agrees with treatment of the hyperkalemia  I spoke with duly hospitalist, Dr. Junior.  He will admit    With patient's cough being chronic and no fever or white count, I will hold off on antibiotic treatment for the possible pneumonia identified by radiology on chest x-ray                                 Medical Decision Making      Disposition and Plan     Clinical Impression:  1. Hyperkalemia    2. Weakness    3. Renal failure, chronic, stage 5  (HCC)         Disposition:  Admit  1/31/2024  1:49 pm    Follow-up:  No follow-up provider specified.        Medications Prescribed:  Current Discharge Medication List                            Hospital Problems       Present on Admission  Date Reviewed: 1/30/2024            ICD-10-CM Noted POA    * (Principal) Hyperkalemia E87.5 1/31/2024 Unknown

## 2024-01-31 NOTE — PROGRESS NOTES
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 Pt in [place] currently-> HD / UF via AVF.     Samaria Shepherd MD  1/31/2024  139 PM

## 2024-01-31 NOTE — ED QUICK NOTES
Orders for admission, patient is aware of plan and ready to go upstairs. Any questions, please call ED RN Roxane at extension [phone].     Patient Covid vaccination status: Fully vaccinated     COVID Test Ordered in ED: SARS-CoV-2/Flu A and B/RSV by PCR (GeneXpert)    COVID Suspicion at Admission: N/A    Running Infusions:      Mental Status/LOC at time of transport: Alert to self    Other pertinent information:   CIWA score: N/A   NIH score:  N/A

## 2024-01-31 NOTE — H&P
[facility] History and Physical      Chief Complaint   Patient presents with    Weakness        PCP: Anoop Keys MD      History of Present Illness: Patient is a 78 year old male with PMH sig for orthotopic heart transplant, end-stage renal disease on hemodialysis, hypertension, hyperlipidemia, dementia presented after missing 2 rounds of dialysis.  He recently had peritoneal dialysis placed, there was accidental tugging of the catheter for which she had to see surgery in clinic through this process ended up missing 2 rounds of dialysis.  The night before he was very short of breath, weak and his legs were swollen according to his wife.  He is a poor historian currently.  In the ER his vitals were stable.  Creatinine was 17.  Nephrology was consulted and plan is for dialysis later today.    Past Medical History:   Diagnosis Date    Back problem     BPH (benign prostatic hyperplasia)     Cataract     CKD (chronic kidney disease)     Congestive heart disease (HCC)     before transplant in 2006    Dialysis patient ([facility]) 07/27/2023    HEMODIALYSIS TUES/THURS/SAT 3.5HRS    Esophageal reflux     Fistula     RIGHT ARM    Glaucoma     Gout     Hearing impairment     HA's x2, doesnt wear    Heart transplanted ([facility]) 2006 2006-@ [facility]    High blood pressure     High cholesterol     Hypercholesterolemia     Muscle weakness     uses a cane- balance issues    Obstructive sleep apnea syndrome 02/04/2009    Problems with swallowing     Renal disorder     CKD    Short-term memory loss     Shortness of breath     Sleep apnea     had UPPP did not need device after    Visual impairment     glasses      Past Surgical History:   Procedure Laterality Date    ARTHROSCOPY OF JOINT UNLISTED Right     rotator cuff repair    AV FISTULA REVISION, OPEN      incorrect info per wife    COLONOSCOPY      HEART TRANSPLANT  2006    [facility]     KNEE REPLACEMENT SURGERY Left 2018    x2    OTHER SURGICAL HISTORY  08/05/2021     Ihsan Delgado    ROTATOR CUFF REPAIR      SINUS SURGERY        TONSILLECTOMY      TOTAL KNEE REPLACEMENT Left     2 on this leg    UPPER GI ENDOSCOPY,EXAM          ALL:  Allergies   Allergen Reactions    Amlodipine OTHER (SEE COMMENTS) and SWELLING     Leg swelling    Dalteparin HIVES and RASH     Fragmin      Lisinopril OTHER (SEE COMMENTS)     Hyperkalemia     Penicillins HIVES, RASH, SWELLING and SHORTNESS OF BREATH    Oxycodone OTHER (SEE COMMENTS)     Lost his taste and wt loss        Prior to Admission Medications   Medication Sig    senna-docusate 8.6-50 MG Oral Tab Take 1 tablet by mouth daily.    HYDROcodone-acetaminophen 5-325 MG Oral Tab Take 1-2 tablets by mouth every 6 (six) hours as needed for Pain.    memantine 10 MG Oral Tab Take 1 tablet (10 mg total) by mouth daily.    risperiDONE 0.5 MG Oral Tab Take 1.5 tablets (0.75 mg total) by mouth nightly. May also take 0.5 tablet (0.25 mg total) daily as needed (agitation).    tacrolimus 1 MG Oral Cap Take 1 capsule (1 mg total) by mouth 2 (two) times daily. FOR HEART TRANSPLANT  PATIENT TAKES TACROLIMUS 1.5 MG IN THE AM AND 1 MG IN THE EVENING    sevelamer carbonate 800 MG Oral Tab Take 1 tablet (800 mg total) by mouth 3 (three) times daily with meals.    gabapentin 100 MG Oral Cap Take 1 capsule (100 mg total) by mouth 3 (three) times a week.    Nephro-Jaime 0.8 MG Oral Tab Take 1 tablet (0.8 mg total) by mouth daily.    tacrolimus 0.5 MG Oral Cap Take 1 capsule (0.5 mg total) by mouth 2 (two) times daily. FOR HEART TRANSPLANT    donepezil 10 MG Oral Tab Take 1 tablet (10 mg total) by mouth nightly.    hydrOXYzine 25 MG Oral Tab Take 1 tablet (25 mg total) by mouth every 8 (eight) hours as needed for Itching.    Pravastatin Sodium 20 MG Oral Tab Take 1 tablet (20 mg total) by mouth nightly.    allopurinol 100 MG Oral Tab Take 0.5 tablets (50 mg total) by mouth daily.       Social History     Tobacco Use    Smoking status: Former     Types: Cigarettes      Quit date:      Years since quittin.0    Smokeless tobacco: Never   Substance Use Topics    Alcohol use: Yes     Comment: 1 beer per day        Fam Hx  Family History   Problem Relation Age of Onset    Heart Disorder Father         heart attack    Heart Disorder Mother         CHF       Review of Systems  Comprehensive ROS reviewed and negative except for what is stated in HPI.      OBJECTIVE:  BP (!) 132/96   Pulse 88   Temp 98.5 °F (36.9 °C) (Temporal)   Resp 11   Ht 5' 11\" (1.803 m)   Wt 185 lb (83.9 kg)   SpO2 100%   BMI 25.80 kg/m²   General:  Alert, no distress, appears stated age. Pleasantly confused   Head:  Normocephalic, without obvious abnormality, atraumatic.   Eyes:  Sclera anicteric, No conjunctival pallor, EOMs intact.    Nose: Nares normal. Septum midline. Mucosa normal. No drainage.   Throat: Lips, mucosa, and tongue normal. Teeth and gums normal.   Neck: Supple, symmetrical, trachea midline, no cervical or supraclavicular lymph adenopathy, thyroid: no enlargment/tenderness/nodules appreciated   Lungs:   Clear to auscultation bilaterally. Normal effort   Chest wall:  No tenderness or deformity.   Heart:  Regular rate and rhythm, S1, S2 normal, no murmur, rub or gallop appreciated   Abdomen:   Soft, non-tender. Bowel sounds normal. No masses,  No organomegaly. Non distended   Extremities: Extremities normal, atraumatic, no cyanosis, 1+ BLE edema.   Skin: Skin color, texture, turgor normal. No rashes or lesions.    Neurologic: Normal strength, no focal deficit appreciated     Data Review:    LABS:   Lab Results   Component Value Date    WBC 4.0 2024    HGB 9.6 2024    HCT 27.1 2024    PLT 96.0 2024    CREATSERUM 17.60 2024     2024     2024    K 6.2 2024     2024    CO2 22.0 2024    GLU 95 2024    CA 7.5 2024    ALB 3.0 2024    ALKPHO 159 2024    BILT 0.6 2024    TP 6.4  01/31/2024    AST 9 01/31/2024    ALT  01/31/2024      Comment:      Due to  backorder we are temporarily unable to offer hospital-based ALT testing at [facility].   If urgently needed, please order ALT test code [id number].   The new order will need a new venipuncture and will be sent to [facility] for testing.   The expected turnaround time will be within 24 hours.   Due to  backorder we are temporarily unable to offer hospital-based ALT testing at [facility].   If urgently needed, please order ALT test code [id number].   The new order will need a new venipuncture and will be sent to [facility] for testing.   The expected turnaround time will be within 24 hours.        CXR: All imaging personally reviewed.      Radiology: CT BRAIN OR HEAD (60383)    Result Date: 1/31/2024  PROCEDURE:  CT BRAIN OR HEAD (70427)  COMPARISON:  [facility] , CT, CT BRAIN OR HEAD ([id number]), 1/07/2024, 3:29 PM.  INDICATIONS:  leg weakness, hx dementia  TECHNIQUE:  Noncontrast CT scanning is performed through the brain. Dose reduction techniques were used. Dose information is transmitted to the ACR (American College of Radiology) NRDR (National Radiology Data Registry) which includes the Dose Index Registry.  PATIENT STATED HISTORY: (As transcribed by Technologist)     FINDINGS:  VENTRICLES/SULCI:  Stable mild symmetric prominence of ventricular system and cortical sulci.  No extra-axial fluid collection or midline shift. INTRACRANIAL:  No intracranial mass or hemorrhage.  No sign of acute territorial infarction.  Stable mild symmetric low attenuation of cerebral white matter consistent with chronic small vessel ischemic changes.  Atherosclerotic calcifications at the skull base. SINUSES:           No sign of acute sinusitis.  MASTOIDS:          No sign of acute inflammation. SKULL:             No evidence for fracture or osseous abnormality. OTHER:             No significant change.            CONCLUSION:  No acute  intracranial pathology.    LOCATION:  [id number]   Dictated by (CST): Emile Castellon MD on 1/31/2024 at 12:09 PM     Finalized by (CST): Emile Castellon MD on 1/31/2024 at 12:12 PM       XR CHEST AP PORTABLE  (CPT=71045)    Result Date: 1/31/2024  PROCEDURE:  XR CHEST AP PORTABLE  (CPT=71045)  TECHNIQUE:  AP chest radiograph was obtained.  COMPARISON:  [facility] , XR, XR CHEST AP PORTABLE  (CPT=71045), 11/06/2023, 10:47 AM.  INDICATIONS:  leg weakness, hx dementia  PATIENT STATED HISTORY: (As transcribed by Technologist)  Patient offered no additional history at this time    FINDINGS:  Magnified heart with sternotomy change.  Normal pulmonary vascularity.  Mildly tortuous and calcified aorta.  Increased left basilar atelectasis and alveolar opacity at medial right lung base.            CONCLUSION:  1. Left basilar atelectasis. 2. Possible mild basilar pneumonia.   LOCATION:  [id number]      Dictated by (CST): Emile Castellon MD on 1/31/2024 at 12:02 PM     Finalized by (CST): Emile Castellon MD on 1/31/2024 at 12:04 PM       CT BRAIN OR HEAD ([id number])    Result Date: 1/7/2024  PROCEDURE:  CT BRAIN OR HEAD ([id number])  COMPARISON:  [facility] , CT, CT BRAIN OR HEAD (31861), 11/03/2023, 2:23 PM.  [facility] , CT, CTA BRAIN + CTA CAROTIDS (CPT=70496/67654), 10/22/2023, 7:08 PM.  INDICATIONS:  worsening tremors  TECHNIQUE:  Noncontrast CT scanning is performed through the brain. Dose reduction techniques were used. Dose information is transmitted to the ACR (American College of Radiology) NRDR (National Radiology Data Registry) which includes the Dose Index Registry.  PATIENT STATED HISTORY: (As transcribed by Technologist)  Patient has worsening tremors    FINDINGS:  There is cerebral atrophy with symmetric prominence of the ventricles. There are patchy areas of low attenuation in the periventricular and deep white matter which are nonspecific but most consistent with small vessel ischemic changes. There is no evidence of hemorrhage, mass, midline  shift, or extra-axial fluid collection.  The visualized paranasal sinuses show mucosal thickening within the left maxillary sinus.. No evidence of depressed skull fracture.            CONCLUSION: 1. No acute intracranial findings 2. Cerebral atrophy with chronic microvascular ischemic changes.    LOCATION:  [facility]   Dictated by (CST): Dipika Best MD on 1/07/2024 at 4:02 PM     Finalized by (CST): Dipika Best MD on 1/07/2024 at 4:03 PM          Assessment/Plan:     78 year old male with PMH sig for orthotopic heart transplant, end-stage renal disease on hemodialysis, hypertension, hyperlipidemia, dementia presented after missing 2 rounds of dialysis.    Missed HD x2  ESRD on HD  - plan for HD tonight per nephrology  - monitor I/Os    S/p OHT  - no cardiac symptoms  - troponin negative  - cont PTA tacro dosing    Dementia   - aricept, namendea, risperidone     FEN: regular diet, PT/OT  Proph: SCDs, heparin  Code status: DNAR    Outpatient records or previous hospital records reviewed.   [facility] hospitalist to continue to follow patient while in house      Sarita Wilson MD  [facility]  Hospitalist  Message over [employer]  Pager: [phone]

## 2024-01-31 NOTE — PLAN OF CARE
NURSING ADMISSION NOTE    Patient admitted via Cart  Oriented to room.  Safety precautions initiated.  Bed in low position.  Call light in reach.    Admission database completed by this RN.

## 2024-01-31 NOTE — ED INITIAL ASSESSMENT (HPI)
Per family member, patient was complaining generalized weakness and bilateral leg weakness yesterday. Speech changes \"he'll start talking and then zone out.\" Speech has not returned to normal. Has had multiple CT's to rule out strokes here for speech changes. Seen here Friday for peritoneal dialysis. Patient has history of dementia.

## 2024-02-01 PROBLEM — N18.6 ESRD ON DIALYSIS (HCC): Status: ACTIVE | Noted: 2024-02-01

## 2024-02-01 PROBLEM — Z99.2 ESRD ON DIALYSIS (HCC): Status: ACTIVE | Noted: 2024-02-01

## 2024-02-01 LAB
ALBUMIN SERPL-MCNC: 3.1 G/DL (ref 3.4–5)
ALBUMIN/GLOB SERPL: 0.8 {RATIO} (ref 1–2)
ALP LIVER SERPL-CCNC: 168 U/L
ALT SERPL-CCNC: 6 U/L
ANION GAP SERPL CALC-SCNC: 7 MMOL/L (ref 0–18)
AST SERPL-CCNC: 14 U/L (ref 15–37)
BASOPHILS # BLD AUTO: 0.02 X10(3) UL (ref 0–0.2)
BASOPHILS NFR BLD AUTO: 0.5 %
BILIRUB SERPL-MCNC: 0.8 MG/DL (ref 0.1–2)
BUN BLD-MCNC: 58 MG/DL (ref 9–23)
CALCIUM BLD-MCNC: 8.7 MG/DL (ref 8.5–10.1)
CHLORIDE SERPL-SCNC: 100 MMOL/L (ref 98–112)
CO2 SERPL-SCNC: 29 MMOL/L (ref 21–32)
CREAT BLD-MCNC: 12.1 MG/DL
EGFRCR SERPLBLD CKD-EPI 2021: 4 ML/MIN/1.73M2 (ref 60–?)
EOSINOPHIL # BLD AUTO: 0.15 X10(3) UL (ref 0–0.7)
EOSINOPHIL NFR BLD AUTO: 3.8 %
ERYTHROCYTE [DISTWIDTH] IN BLOOD BY AUTOMATED COUNT: 13.6 %
GLOBULIN PLAS-MCNC: 4.1 G/DL (ref 2.8–4.4)
GLUCOSE BLD-MCNC: 108 MG/DL (ref 70–99)
GLUCOSE BLD-MCNC: 142 MG/DL (ref 70–99)
GLUCOSE BLD-MCNC: 144 MG/DL (ref 70–99)
GLUCOSE BLD-MCNC: 98 MG/DL (ref 70–99)
HCT VFR BLD AUTO: 29 %
HGB BLD-MCNC: 10.4 G/DL
IMM GRANULOCYTES # BLD AUTO: 0.02 X10(3) UL (ref 0–1)
IMM GRANULOCYTES NFR BLD: 0.5 %
LYMPHOCYTES # BLD AUTO: 0.67 X10(3) UL (ref 1–4)
LYMPHOCYTES NFR BLD AUTO: 16.8 %
MAGNESIUM SERPL-MCNC: 2.6 MG/DL (ref 1.6–2.6)
MCH RBC QN AUTO: 30.1 PG (ref 26–34)
MCHC RBC AUTO-ENTMCNC: 35.9 G/DL (ref 31–37)
MCV RBC AUTO: 84.1 FL
MONOCYTES # BLD AUTO: 0.45 X10(3) UL (ref 0.1–1)
MONOCYTES NFR BLD AUTO: 11.3 %
NEUTROPHILS # BLD AUTO: 2.67 X10 (3) UL (ref 1.5–7.7)
NEUTROPHILS # BLD AUTO: 2.67 X10(3) UL (ref 1.5–7.7)
NEUTROPHILS NFR BLD AUTO: 67.1 %
OSMOLALITY SERPL CALC.SUM OF ELEC: 298 MOSM/KG (ref 275–295)
PLATELET # BLD AUTO: 98 10(3)UL (ref 150–450)
POTASSIUM SERPL-SCNC: 5.4 MMOL/L (ref 3.5–5.1)
PROT SERPL-MCNC: 7.2 G/DL (ref 6.4–8.2)
RBC # BLD AUTO: 3.45 X10(6)UL
SODIUM SERPL-SCNC: 136 MMOL/L (ref 136–145)
WBC # BLD AUTO: 4 X10(3) UL (ref 4–11)

## 2024-02-01 PROCEDURE — 99222 1ST HOSP IP/OBS MODERATE 55: CPT | Performed by: INTERNAL MEDICINE

## 2024-02-01 RX ORDER — QUETIAPINE FUMARATE 25 MG/1
25 TABLET, FILM COATED ORAL ONCE
Status: COMPLETED | OUTPATIENT
Start: 2024-02-02 | End: 2024-02-02

## 2024-02-01 NOTE — CM/SW NOTE
02/01/24 1400   CM/SW Referral Data   Referral Source Social Work (self-referral)   Reason for Referral Discharge planning   Informant Spouse/Significant Other;EMR;Clinical Staff Member   Medical Hx   Does patient have an established PCP? Yes   Patient Info   Patient's Current Mental Status at Time of Assessment Memory Impairments   Patient's Home Environment House   Patient lives with Spouse/Significant other   Patient Status Prior to Admission   Independent with ADLs and Mobility Yes   Services in place prior to admission Dialysis   Dialysis Clinic [facility]   Scheduled Dialysis days T-TH-SAT   Discharge Needs   Anticipated D/C needs Home health care   Choice of Post-Acute Provider   Informed patient of right to choose their preferred provider Yes   List of appropriate post-acute services provided to patient/family with quality data Yes   Patient/family choice Residential Home Health   Information given to Spouse/Significant other     HOME SITUATION  Type of Home: House   Home Layout: One level     Lives With: Spouse  Drives: No  Patient Owned Equipment: Cane     Prior Level of [place] per PT eval: Pt poor historian. Per chart review, pt ambulatory with use of cane, indep with ADLs.     Pt is a 77 y/o male admitted with hyperkalemia. MARCELLE noted PT recommendation for home health. HH referrals sent in [facility]. Chart reviewed and noted pt has hx of dementia. SW attempted to meet with spouse, pt's spouse not at beside. MARCELLE spoke with pt's spouse via phone regarding discharge planning.     Pt lives with his spouse and uses a walker for ambulation. Pt's spouse stated pt has HD at [facility]. Pt' spouse stated pt has homemaker services approved through [facility] starting next week. MARCELLE discussed HH, pt's spouse agreeable and stated she is at pt's bedside.     MARCELLE met with pt's spouse at bedside and provided her with HH choice list. Pt's spouse selected [facility]. Pt's spouse denied  any further needs at this time.     [facility] reserved in [facility]. SW will continue to follow.     JOHNNIE Patel  Discharge Planner

## 2024-02-01 NOTE — PHYSICAL THERAPY NOTE
PHYSICAL THERAPY EVALUATION - INPATIENT     Room Number: 3622/3622-A  Evaluation Date: 2/1/2024  Type of Evaluation: Initial  Physician Order: PT Eval and Treat    Presenting Problem: Weakness, speech changes  Co-Morbidities : dementia, ESRD, HTN, GERD  Reason for Therapy: Mobility Dysfunction and Discharge Planning    History related to current admission: Patient is a 78 year old male admitted on 1/31/2024 from home for weakness, speech changes.      Previous Admissions:   11/3-11/7/2023: metabolic encephalopathy; rec home  10/22-10/24/2023: Vertebral artery stenosis; DC home      ASSESSMENT   In this PT evaluation, the patient presents with the following impairments decreased strength, functional mobility, trunk control, endurance, balance.  These impairments and comorbidities manifest themselves as functional limitations in independent bed mobility, transfers, and gait.  The patient is below baseline and would benefit from skilled inpatient PT to address the above deficits to assist patient in returning to prior to level of function.   Functional outcome measures completed include AMPAC.  The AM-PAC '6-Clicks' Inpatient Basic Mobility Short Form was completed and this patient is demonstrating a Approx Degree of Impairment: 46.58%  degree of impairment in mobility. Research supports that patients with this level of impairment may benefit from HHPT.    DISCHARGE RECOMMENDATIONS  PT Discharge Recommendations: Home with home health PT;24 hour care/supervision    PLAN  PT Treatment Plan: Bed mobility;Body mechanics;Endurance;Energy conservation;Patient education;Family education;Gait training;Strengthening;Balance training;Transfer training  Rehab Potential : Fair  Frequency (Obs): 3x/week  Number of Visits to Meet Established Goals: 3      CURRENT GOALS    Goal #1 Patient is able to demonstrate supine - sit EOB @ level: supervision  MET   Goal #2 Patient is able to demonstrate transfers EOB to/from  Chair/Wheelchair at assistance level: supervision     Goal #3 Patient is able to ambulate 150 feet with assist device:  LRAD  at assistance level: supervision     Goal #4    Goal #5    Goal #6    Goal Comments: Goals established on 2024    HOME SITUATION  Type of Home: House   Home Layout: One level                Lives With: Spouse  Drives: No  Patient Owned Equipment: Cane       Prior Level of [place]: Pt poor historian. Per chart review, pt ambulatory with use of cane, indep with ADLs.     SUBJECTIVE  \"I drive all the time\"       OBJECTIVE  Precautions: Bed/chair alarm  Fall Risk: High fall risk    WEIGHT BEARING RESTRICTION  Weight Bearing Restriction: None                PAIN ASSESSMENT  Ratin          COGNITION  Orientation Level:  oriented to place and disoriented to person  Safety Judgement:  decreased awareness of need for assistance and decreased awareness of need for safety  Awareness of Errors:  decreased awareness of errors   Awareness of Deficits:  decreased awareness of deficits  Problem Solving:  assistance required to identify errors made, assistance required to generate solutions, and assistance required to implement solutions    RANGE OF MOTION AND STRENGTH ASSESSMENT  Upper extremity ROM and strength are within functional limitsexcept for the following:    Grossly 5/5, 4/5 left shoulder     Lower extremity ROM is within functional limits     Lower extremity strength is within functional limits        BALANCE  Static Sitting: Fair -  Dynamic Sitting: Poor +  Static Standing: Poor +  Dynamic Standing: Poor +    ADDITIONAL TESTS                                    ACTIVITY TOLERANCE  Pulse: 88  Heart Rate Source: Monitor                   O2 WALK  Oxygen Therapy  SPO2% on Room Air at Rest: 93    NEUROLOGICAL FINDINGS                        AM-PAC '6-Clicks' INPATIENT SHORT FORM - BASIC MOBILITY  How much difficulty does the patient currently have...  Patient Difficulty: Turning over in  bed (including adjusting bedclothes, sheets and blankets)?: A Little   Patient Difficulty: Sitting down on and standing up from a chair with arms (e.g., wheelchair, bedside commode, etc.): A Little   Patient Difficulty: Moving from lying on back to sitting on the side of the bed?: A Little   How much help from another person does the patient currently need...   Help from Another: Moving to and from a bed to a chair (including a wheelchair)?: A Little   Help from Another: Need to walk in hospital room?: A Little   Help from Another: Climbing 3-5 steps with a railing?: A Little       AM-PAC Score:  Raw Score: 18   Approx Degree of Impairment: 46.58%   Standardized Score (AM-PAC Scale): 43.63   CMS Modifier (G-Code): CK    FUNCTIONAL ABILITY STATUS  Gait Assessment   Functional Mobility/Gait Assessment  Gait Assistance: Contact guard assist  Distance (ft): 100  Assistive Device: Rolling walker  Pattern: Shuffle (varying zackery)    Skilled Therapy Provided     Gait Training:   Pt cued on upright standing posture to improve alignment with BUEs; with pt increasing zackery/shuffle gait, pt with increased anteropulsion of RW  Pt cued on gait sequencing with RW - pt demo occasional increase in shuffle gait, requires cuing to decrease zackery for improved safety with RW usage  Pt cued on proper UE placement on RW handles    Therapeutic Activity:   Pt cued on placement of UE and LEs for optimal force generation and safe STS transfer.   Pt cued on usage of arm rests for controlled descent into sitting  Pt cued on scooting anteriorly towards EOB for improved static sitting balance with feet on floor.        Bed Mobility:  Rolling: NT  Supine to sit: supervision   Sit to supine: supervision     Transfer Mobility:  Sit to stand: CGA   Stand to sit: CGA  Gait = CGA    Therapist's Comments: RN cleared for session. Pt agreeable for therapy, received supine. Donned shoes with total A. Instructed to call for nursing staff for any  needs and OOB mobility.     Exercise/Education Provided:  Bed mobility  Body mechanics  Energy conservation  Functional activity tolerated  Gait training  Posture  Strengthening  Transfer training    Patient End of Session: In bed;Needs met;Call light within reach;RN aware of session/findings;All patient questions and concerns addressed;Alarm set;Discussed recommendations with /      Patient Evaluation Complexity Level:  History High - 3 or more personal factors and/or co-morbidities   Examination of body systems Moderate - addressing a total of 3 or more elements   Clinical Presentation Moderate - Evolving   Clinical Decision Making Moderate - Evolving       PT Session Time: 30 minutes  Gait Training: 15 minutes  Therapeutic Activity: 4 minutes       Satisfactory

## 2024-02-01 NOTE — PROGRESS NOTES
[facility]  Hospitalist Progress Note                                                                     [facility]        Nader Campbell  8/10/1945    SUBJECTIVE:  Patient seen and examined.   More alert today.  Pleasantly confused.  Wife bedside.  Denies CP/SOB.  NAD.       OBJECTIVE:  Temp:  [97.8 °F (36.6 °C)-98.8 °F (37.1 °C)] 98.8 °F (37.1 °C)  Pulse:  [75-96] 95  Resp:  [10-19] 17  BP: ()/(43-99) 127/85  SpO2:  [91 %-100 %] 95 %  Exam  Gen: No acute distress, alert and oriented x2-3, no focal neurologic deficits  Pulm: Lungs clear bilaterally, normal respiratory effort  CV: Heart with regular rate and rhythm, no murmur.  Normal PMI.    Abd: Abdomen soft, nontender, nondistended, no organomegaly, bowel sounds present  MSK: Full range of motion in extremities, no clubbing, no cyanosis  Skin: no rashes or lesions    Labs:   Recent Labs   Lab 01/26/24  1010 01/27/24  0322 01/31/24  1131 02/01/24  0825   WBC 4.0 4.5 4.0 4.0   HGB 11.3* 11.0* 9.6* 10.4*   MCV 84.7 85.4 84.4 84.1   .0* 120.0* 96.0* 98.0*       Recent Labs   Lab 01/26/24  1010 01/27/24  0322 01/31/24  1131 02/01/24  0826    136 139 136   K 4.8 4.9 6.2* 5.4*    96* 105 100   CO2 33.0* 32.0 22.0 29.0   BUN 45* 57* 104* 58*   CREATSERUM 10.50* 12.30* 17.60* 12.10*   CA 8.8 8.7 7.5* 8.7   MG  --   --   --  2.6   GLU 95 137* 95 98       Recent Labs   Lab 01/31/24  1131 02/01/24  0826   ALT  --  6*   AST 9* 14*   ALB 3.0* 3.1*       Recent Labs   Lab 01/31/24  1403 01/31/24  1447 02/01/24  1153   PGLU 97 108* 144*       Meds:   Scheduled:    epoetin jevon  10,000 Units Intravenous Once in dialysis    allopurinol  50 mg Oral Daily    donepezil  10 mg Oral Nightly    gabapentin  100 mg Oral Once per day on Monday Wednesday Friday    memantine  10 mg Oral Daily    risperiDONE  0.75 mg Oral Nightly    tacrolimus  0.5 mg Oral Daily    tacrolimus  1 mg Oral BID      Continuous Infusions:   PRN: acetaminophen, melatonin, metoclopramide    Assessment/Plan:  Principal Problem:    Hyperkalemia  Active Problems:    Weakness    Renal failure, chronic, stage 5 (HCC)    78 year old male with PMH sig for orthotopic heart transplant, end-stage renal disease on hemodialysis, hypertension, hyperlipidemia, dementia presented after missing 2 rounds of dialysis.     Missed HD x2  ESRD on HD  - repeat HD today per nephrology  - monitor I/Os     S/p OHT  - no cardiac symptoms  - troponin negative  - cont PTA tacro dosing     Dementia   - aricept, namendea, risperidone      FEN: regular diet, PT/OT  Proph: SCDs, heparin  Code status: DNAR    Dispo - dc home tomorrow     Sarita Wilson MD  [facility] Hospitalist  Message over [employer]  Pager: [phone]

## 2024-02-01 NOTE — PLAN OF CARE
Assumed care at 0730  A/O x 2 - oriented to person and place, disoriented to time and situation. Wears glasses.  Updated patients wife, chelly via phone  RA  NSR on tele  VSS  Denies pain  Up with 1-2 assist. Anuric.  Hemodialysis pt. AV fistula to R arm. PD port newly placed with dressing c/d/I.   Renal diet  Hep for VTE  MICHAEL extended PIV c/d/I  Takes pills whole   PT/OT eval- up with walker  Fall risk. Fall precautions in place. Strict I/O / daily weight.   Plan for HD today. Pt and pts wife updated. Will continue with plan of care.     Problem: METABOLIC/FLUID AND ELECTROLYTES - ADULT  Goal: Electrolytes maintained within normal limits  Description: INTERVENTIONS:  - Monitor labs and rhythm and assess patient for signs and symptoms of electrolyte imbalances  - Administer electrolyte replacement as ordered  - Monitor response to electrolyte replacements, including rhythm and repeat lab results as appropriate  - Fluid restriction as ordered  - Instruct patient on fluid and nutrition restrictions as appropriate  Outcome: Progressing  Goal: Hemodynamic stability and optimal renal function maintained  Description: INTERVENTIONS:  - Monitor labs and assess for signs and symptoms of volume excess or deficit  - Monitor intake, output and patient weight  - Monitor urine specific gravity, serum osmolarity and serum sodium as indicated or ordered  - Monitor response to interventions for patient's volume status, including labs, urine output, blood pressure (other measures as available)  - Encourage oral intake as appropriate  - Instruct patient on fluid and nutrition restrictions as appropriate  Outcome: Progressing

## 2024-02-01 NOTE — PLAN OF CARE
End of shift note:  Assumed care of pt 1/31 at 1930. Pt A&Ox1, confused but redirectable. Denied any pain overnight. On tele, SR. Room air-No SOB. Per wife pt is anuric. Pt had dialysis overnight w/ 3L removed. R arm precautions d/t AVF. LLQ w/ PD cath-dressing is c/d/I. Fall precautions in place. Plan for am labs and PT/OT to eval pt. Pt updated on POC, all questions answered.

## 2024-02-01 NOTE — CONSULTS
[facility]  Report of Consultation    Nader Campbell Patient Status:  Inpatient    8/10/1945 MRN [id number]   Location [facility] 3NE-A Attending Molina Hart MD   Hosp Day # 1 PCP Anoop Keys MD     Reason for Consultation:  ESRD    History of Present Illness:  Nader Campbell is a a(n) 78 year old male with hx of ESRD on HD - planning to transition to PD (has PD cath placed ), Heart tx, dementia, who is admitted to hospital w/ weakness.  Patient had missed 2 HD sessions- typically TTS.   He was dialyzed yesterday- feels better overall  He is a poor historian generally    Care reviewed w/ RN @ bedside      History:  Past Medical History:   Diagnosis Date    Back problem     BPH (benign prostatic hyperplasia)     Cataract     CKD (chronic kidney disease)     Congestive heart disease (HCC)     before transplant in     Dialysis patient ([facility]) 2023    HEMODIALYSIS TUES/THURS/SAT 3.5HRS    Esophageal reflux     Fistula     RIGHT ARM    Glaucoma     Gout     Hearing impairment     HA's x2, doesnt wear    Heart transplanted ([facility]) 2006-@ [facility]    High blood pressure     High cholesterol     Hypercholesterolemia     Muscle weakness     uses a cane- balance issues    Obstructive sleep apnea syndrome 2009    Problems with swallowing     Renal disorder     CKD    Short-term memory loss     Shortness of breath     Sleep apnea     had UPPP did not need device after    Visual impairment     glasses     Past Surgical History:   Procedure Laterality Date    ARTHROSCOPY OF JOINT UNLISTED Right     rotator cuff repair    AV FISTULA REVISION, OPEN      incorrect info per wife    COLONOSCOPY      HEART TRANSPLANT      [facility]     KNEE REPLACEMENT SURGERY Left 2018    x2    OTHER SURGICAL HISTORY  2021    Cysto Dr. Delgado    ROTATOR CUFF REPAIR      SINUS SURGERY        TONSILLECTOMY      TOTAL KNEE REPLACEMENT Left     2 on this leg    UPPER GI  ENDOSCOPY,EXAM       Family History   Problem Relation Age of Onset    Heart Disorder Father         heart attack    Heart Disorder Mother         CHF      reports that he quit smoking about 14 years ago. His smoking use included cigarettes. He has never used smokeless tobacco. He reports current alcohol use. He reports that he does not currently use drugs.    Allergies:  Allergies   Allergen Reactions    Amlodipine OTHER (SEE COMMENTS) and SWELLING     Leg swelling    Dalteparin HIVES and RASH     Fragmin      Lisinopril OTHER (SEE COMMENTS)     Hyperkalemia     Penicillins HIVES, RASH, SWELLING and SHORTNESS OF BREATH    Oxycodone OTHER (SEE COMMENTS)     Lost his taste and wt loss       Current Medications:    Current Facility-Administered Medications:     allopurinol (Zyloprim) tab 50 mg, 50 mg, Oral, Daily    donepezil (Aricept) tab 10 mg, 10 mg, Oral, Nightly    gabapentin (Neurontin) cap 100 mg, 100 mg, Oral, Once per day on Monday Wednesday Friday    memantine (Namenda) tab 10 mg, 10 mg, Oral, Daily    risperiDONE (RisperDAL) tab 0.75 mg, 0.75 mg, Oral, Nightly    tacrolimus (Prograf) cap 0.5 mg, 0.5 mg, Oral, Daily    tacrolimus (Prograf) cap 1 mg, 1 mg, Oral, BID    acetaminophen (Tylenol Extra Strength) tab 500 mg, 500 mg, Oral, Q4H PRN    melatonin tab 3 mg, 3 mg, Oral, Nightly PRN    metoclopramide (Reglan) 5 mg/mL injection 5 mg, 5 mg, Intravenous, Q8H PRN  Home Medications:  No current outpatient medications on file.       Review of Systems:  See HPI; A total of 12 systems reviewed and otherwise unremarkable.    Physical Exam:  Vital signs: Blood pressure 127/85, pulse 95, temperature 98.8 °F (37.1 °C), temperature source Oral, resp. rate 17, height 5' 11\" (1.803 m), weight 189 lb (85.7 kg), SpO2 95%.  General: No acute distress. Alert and awake   HEENT: Moist mucous membranes. EOM-I. PERRL  Neck: No lymphadenopathy.  No JVD. No carotid bruits.  Respiratory: Clear to auscultation bilaterally.  No  wheezes. No rhonchi.  Cardiovascular: S1, S2.  Regular rate and rhythm.  No murmurs. Equal pulses   Abdomen: Soft, nontender, nondistended.  Positive bowel sounds. No rebound tenderness ; PD cath intact- surrounding echymosis noted.   Neurologic: No focal neurological deficits.   Musculoskeletal: Full range of motion of all extremities.  No swelling noted.   Integument: No lesions. No erythema.  Psychiatric: Appropriate mood and affect.    Laboratory:  Lab Results   Component Value Date    WBC 4.0 02/01/2024    HGB 10.4 02/01/2024    HCT 29.0 02/01/2024    PLT 98.0 02/01/2024    CREATSERUM 12.10 02/01/2024    BUN 58 02/01/2024     02/01/2024    K 5.4 02/01/2024     02/01/2024    CO2 29.0 02/01/2024    GLU 98 02/01/2024    CA 8.7 02/01/2024    ALB 3.1 02/01/2024    ALKPHO 168 02/01/2024    BILT 0.8 02/01/2024    TP 7.2 02/01/2024    AST 14 02/01/2024    ALT 6 02/01/2024    MG 2.6 02/01/2024    PGLU 108 01/31/2024          BUN (mg/dL)   Date Value   02/01/2024 58 (H)   01/31/2024 104 (H)   01/27/2024 57 (H)     Blood Urea Nitrogen (mg/dL)   Date Value   05/25/2021 40.0 (H)   10/19/2020 47.0 (H)   09/30/2020 68.0 (H)     Creatinine (mg/dL)   Date Value   02/01/2024 12.10 (H)   01/31/2024 17.60 (H)   01/27/2024 12.30 (H)   05/25/2021 2.03 (H)   10/19/2020 2.81 (H)   09/30/2020 3.10 (H)         Imaging:  Reviewed     Impression:  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  Please feel free to call me with any questions or concerns.    Antonio Taylor MD  2/1/2024  10:27 AM

## 2024-02-01 NOTE — DISCHARGE INSTRUCTIONS
Sometimes managing your health at home requires assistance.  The [facility] team has recognized your preference to use [facility].  They can be reached by phone at [phone].  The fax number for your reference is [phone].. A representative from the home health agency will contact you or your family to schedule your first visit.

## 2024-02-02 ENCOUNTER — PATIENT MESSAGE (OUTPATIENT)
Dept: NEUROLOGY | Facility: CLINIC | Age: 79
End: 2024-02-02

## 2024-02-02 ENCOUNTER — TELEPHONE (OUTPATIENT)
Facility: LOCATION | Age: 79
End: 2024-02-02

## 2024-02-02 VITALS
HEART RATE: 85 BPM | RESPIRATION RATE: 20 BRPM | OXYGEN SATURATION: 96 % | BODY MASS INDEX: 26.46 KG/M2 | SYSTOLIC BLOOD PRESSURE: 133 MMHG | WEIGHT: 189 LBS | HEIGHT: 71 IN | DIASTOLIC BLOOD PRESSURE: 85 MMHG | TEMPERATURE: 99 F

## 2024-02-02 LAB
ANION GAP SERPL CALC-SCNC: 5 MMOL/L (ref 0–18)
BUN BLD-MCNC: 36 MG/DL (ref 9–23)
CALCIUM BLD-MCNC: 8.9 MG/DL (ref 8.5–10.1)
CHLORIDE SERPL-SCNC: 104 MMOL/L (ref 98–112)
CO2 SERPL-SCNC: 31 MMOL/L (ref 21–32)
CREAT BLD-MCNC: 8.86 MG/DL
EGFRCR SERPLBLD CKD-EPI 2021: 6 ML/MIN/1.73M2 (ref 60–?)
GLUCOSE BLD-MCNC: 102 MG/DL (ref 70–99)
GLUCOSE BLD-MCNC: 138 MG/DL (ref 70–99)
MAGNESIUM SERPL-MCNC: 2.7 MG/DL (ref 1.6–2.6)
OSMOLALITY SERPL CALC.SUM OF ELEC: 299 MOSM/KG (ref 275–295)
POTASSIUM SERPL-SCNC: 4.1 MMOL/L (ref 3.5–5.1)
SODIUM SERPL-SCNC: 140 MMOL/L (ref 136–145)

## 2024-02-02 PROCEDURE — 99232 SBSQ HOSP IP/OBS MODERATE 35: CPT | Performed by: INTERNAL MEDICINE

## 2024-02-02 RX ORDER — RISPERIDONE 0.25 MG/1
TABLET ORAL
COMMUNITY
Start: 2024-01-23

## 2024-02-02 NOTE — DISCHARGE SUMMARY
[facility] Hospitalist Discharge Summary     Patient ID:  Nader Campbell  78 year old  8/10/1945    Admit date: 1/31/2024    Discharge date and time: 02/02/24       Attending Physician: Jenny Wilson*     Primary Care Physician: Anoop Keys MD     Discharge Diagnoses: Hyperkalemia [E87.5]  Weakness [R53.1]  Renal failure, chronic, stage 5 (HCC) [N18.5]    Please note that only IHP DMG and EMG patients enrolled in the Medicare ACO, [facility] ACO and [facility] HMOs will be handled by the [facility] Care Management team.  For all other patients, please follow usual protocol for discharge care transition.    Discharge Condition: stable    Disposition:  home    Important Follow up:  - PCP within 2 weeks              Hospital Course:      78 year old male with PMH sig for orthotopic heart transplant, end-stage renal disease on hemodialysis, hypertension, hyperlipidemia, dementia presented after missing 2 rounds of dialysis.  He recently had peritoneal dialysis placed, there was accidental tugging of the catheter for which she had to see surgery in clinic through this process ended up missing 2 rounds of dialysis.  The night before he was very short of breath, weak and his legs were swollen according to his wife.  He is a poor historian currently.  In the ER his vitals were stable.  Creatinine was 17.  Nephrology was consulted and plan is for dialysis later today.    Missed HD x2  ESRD on HD  - HD on admission evening and following day per nephrology  - monitor I/Os  - Peritoneal dialysis cath management per renal      S/p OHT  - no cardiac symptoms  - troponin negative  - cont PTA tacro dosing     Dementia   - aricept, namendea, risperidone       Consults: IP CONSULT TO HOSPITALIST  IP CONSULT TO NEPHROLOGY  IP CONSULT TO SOCIAL WORK    Operative Procedures:        Patient instructions:      I as the attending physician reconciled the current and discharge  medications on day of discharge.     Current Discharge Medication List        CONTINUE these medications which have NOT CHANGED    Details   senna-docusate 8.6-50 MG Oral Tab Take 1 tablet by mouth daily.      HYDROcodone-acetaminophen 5-325 MG Oral Tab Take 1-2 tablets by mouth every 6 (six) hours as needed for Pain.      memantine 10 MG Oral Tab Take 1 tablet (10 mg total) by mouth daily.      risperiDONE 0.5 MG Oral Tab Take 1.5 tablets (0.75 mg total) by mouth nightly. May also take 0.5 tablet (0.25 mg total) daily as needed (agitation).      !! tacrolimus 1 MG Oral Cap Take 1 capsule (1 mg total) by mouth 2 (two) times daily. FOR HEART TRANSPLANT  PATIENT TAKES TACROLIMUS 1.5 MG IN THE AM AND 1 MG IN THE EVENING      sevelamer carbonate 800 MG Oral Tab Take 1 tablet (800 mg total) by mouth 3 (three) times daily with meals.      gabapentin 100 MG Oral Cap Take 1 capsule (100 mg total) by mouth 3 (three) times a week.      Nephro-Jaime 0.8 MG Oral Tab Take 1 tablet (0.8 mg total) by mouth daily.      !! tacrolimus 0.5 MG Oral Cap Take 1 capsule (0.5 mg total) by mouth daily. FOR HEART TRANSPLANT      donepezil 10 MG Oral Tab Take 1 tablet (10 mg total) by mouth nightly.      hydrOXYzine 25 MG Oral Tab Take 1 tablet (25 mg total) by mouth every 8 (eight) hours as needed for Itching.      Pravastatin Sodium 20 MG Oral Tab Take 1 tablet (20 mg total) by mouth nightly.      allopurinol 100 MG Oral Tab Take 0.5 tablets (50 mg total) by mouth daily.       !! - Potential duplicate medications found. Please discuss with provider.          Activity: activity as tolerated  Diet: regular diet  Wound Care: as directed  Code Status: DNAR/Selective Treatment      Discharge Exam:     General: no acute distress, alert and oriented x 2  Heart: RRR  Lungs: clear bilaterally, no active wheezing  Abdomen: nontender, nondistended, intact BS  Extremities: no pedal edema   Neuro: CN inact, no focal deficits      Total time coordinating  care for discharge: Greater than 30 minutes    Sarita Wilson MD  [facility]

## 2024-02-02 NOTE — PLAN OF CARE
Assumed care of 79 y/o male @1930  A/Ox2, confused & forgetful  RA, NSR-Tele  Heparin for VTE  Renal diet, QID accucheck  Daily weight. I/O's  Voids, up w/walker x1  Hemodialysis 2/1-removed 2L   AV fistula to R arm, R arm prec  MICHAEL ext PIV-SL  Posey vest restraints placed @0000  Safety precautions in place and all needs met    Problem: Safety Risk - Non-Violent Restraints  Goal: Patient will remain free from self-harm  Description: INTERVENTIONS:  - Apply the least restrictive restraint to prevent harm  - Notify patient and family of reasons restraints applied  - Assess for any contributing factors to confusion (electrolyte disturbances, delirium, medications)  - Discontinue any unnecessary medical devices as soon as possible  - Assess the patient's physical comfort, circulation, skin condition, hydration, nutrition and elimination needs   - Reorient and redirection as needed  - Assess for the need to continue restraints  Outcome: Progressing

## 2024-02-02 NOTE — PROGRESS NOTES
[facility]  Progress Note    Nader Campbell Patient Status:  Inpatient    8/10/1945 MRN [id number]   [facility] 3NE-A Attending Molina Hart MD   Hosp Day # 2 PCP Anoop Keys MD          No acute complains; going home      Current Facility-Administered Medications:     allopurinol (Zyloprim) tab 50 mg, 50 mg, Oral, Daily    donepezil (Aricept) tab 10 mg, 10 mg, Oral, Nightly    gabapentin (Neurontin) cap 100 mg, 100 mg, Oral, Once per day on     memantine (Namenda) tab 10 mg, 10 mg, Oral, Daily    risperiDONE (RisperDAL) tab 0.75 mg, 0.75 mg, Oral, Nightly    tacrolimus (Prograf) cap 0.5 mg, 0.5 mg, Oral, Daily    tacrolimus (Prograf) cap 1 mg, 1 mg, Oral, BID    acetaminophen (Tylenol Extra Strength) tab 500 mg, 500 mg, Oral, Q4H PRN    melatonin tab 3 mg, 3 mg, Oral, Nightly PRN       Physical Exam:  Vital signs: Blood pressure 133/85, pulse 85, temperature 98.5 °F (36.9 °C), temperature source Oral, resp. rate 20, height 5' 11\" (1.803 m), weight 189 lb (85.7 kg), SpO2 96%.  General: No acute distress. Alert and awake   HEENT: Moist mucous membranes. EOM-I. PERRL  Neck: No lymphadenopathy.  No JVD. No carotid bruits.  Respiratory: Clear to auscultation bilaterally.  No wheezes. No rhonchi.  Cardiovascular: S1, S2.  Regular rate and rhythm.  No murmurs. Equal pulses   Abdomen: Soft, nontender, nondistended.  Positive bowel sounds. No rebound tenderness ; PD cath intact- surrounding echymosis noted.   Neurologic: No focal neurological deficits.   Musculoskeletal: Full range of motion of all extremities.  No swelling noted.   Integument: No lesions. No erythema.  Psychiatric: Appropriate mood and affect.  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  Please feel free to call me with any questions or concerns.    Antonio Taylor MD  2/2/2024

## 2024-02-02 NOTE — TELEPHONE ENCOUNTER
From: Nader Campbell  To: Jorge Echols  Sent: 2/2/2024 2:36 PM CST  Subject: Prior authorization needed    Nader Campbell [birth year], [employer] is requesting prior authorization for further refills if Risperidone .25 & .50. Also I spoke with Dr Kim via Video concerning Donepezil. I have not heard anything positive concerning the benefits of this medication, she said she would talk to you in regards to stopping because it’s giving to patients as preventative an Nader is long pass that. Please advise

## 2024-02-02 NOTE — OCCUPATIONAL THERAPY NOTE
OCCUPATIONAL THERAPY EVALUATION - INPATIENT     Room Number: 3622/3622-A  Evaluation Date: 2/2/2024  Type of Evaluation: Initial  Presenting Problem: hyperkalemia    Physician Order: IP Consult to Occupational Therapy  Reason for Therapy: ADL/IADL Dysfunction and Discharge Planning    History: Patient is a 78 year old male admitted on 1/31/2024 with Presenting Problem: hyperkalemia. Co-Morbidities : dementia, esrd, htn, gerd, tray, hearing impairment, h/o heart transplant      ASSESSMENT   Patient presents with the following performance deficits: decreased balance, cognitive impairments. These deficits impact the patient’s ability to participate in ADL, transfers, instrumental activities of daily living, rest and sleep, leisure and social participation.     The patient is functioning below his previous functional level and would benefit from skilled inpatient OT to address the above deficits, maximizing patient’s ability to return safely to his prior level of function.    The AM-PAC ' '6-Clicks' Inpatient Daily Activity Short Form was completed and this patient is demonstrating an Approx Degree of Impairment: 46.65% in activities of daily living. Research supports that patients with this level of impairment often benefit from home.    Recommend home with 24 hour supervision and HHOT.       OT Discharge Recommendations: 24 hour care/supervision;Home with home health PT/OT       WEIGHT BEARING RESTRICTION  Weight Bearing Restriction: None                Recommendations for nursing staff:   Transfers: 1 person, RW  Toileting location: bathroom     EVALUATION SESSION:  Patient Start of Session: Supine, Starr vest in place  FUNCTIONAL TRANSFER ASSESSMENT  Sit to Stand: Edge of Bed  Edge of Bed: Stand-by Assist    BED MOBILITY     BALANCE ASSESSMENT     FUNCTIONAL ADL ASSESSMENT  Grooming Standing: Stand-by Assist  Toileting Standing: Stand-by Assist      ACTIVITY TOLERANCE: stable on room air                         O2  SATURATIONS       COGNITION  Arousal/Alertness:  appropriate responses to stimuli  Attention Span:  appears intact  Following Commands:  follows one step commands without difficulty  Initiation: cues to initiate tasks  Awareness of Errors:  decreased awareness of errors   Awareness of Deficits:  decreased awareness of deficits    Upper Extremity   ROM: within functional limits   Strength: within functional limits   Coordination  Gross motor: wfl  Fine motor: wfl  Sensation: denied any numbness or tingling    EDUCATION PROVIDED  Patient : Role of Occupational Therapy; Plan of Care; Discharge Recommendations; Functional Transfer Techniques; Fall Prevention  Patient's Response to Education: Demonstrates Poor Carry Over to Information    Equipment used: rw, gait belt  Demonstrates functional use, Would benefit from additional trial yes     Therapist comments: Patient assisted with supine to sit and functional mobility in hallway with RW. Patient completed standing toileting and hand hygiene with SBA and sequencing cues.Posey vest re-applied in supine    Patient End of Session: All patient questions and concerns addressed;RN aware of session/findings;Call light within reach;Needs met;Alarm set;Restraints;In bed    OCCUPATIONAL PROFILE    HOME SITUATION  Type of Home: House  Home Layout: One level  Lives With: Spouse;Caregiver part-time (supposed to have [facility] bunkersofa assistance starting soon)               Occupation/Status: retired     Drives: No  Patient Regularly Uses: Reading glasses    Prior Level of Function: Per EMR, the patient lives with spouse in a 1 level house with spouse. Patient is supposed to have increasing [facility] homemaker assistance per chart review.    SUBJECTIVE   Pleasant, participatory    PAIN ASSESSMENT  Ratin          OBJECTIVE  Precautions: Bed/chair alarm;Restraints  Fall Risk: High fall risk      ASSESSMENTS    AM-PAC ‘6-Clicks’ Inpatient Daily Activity Short Form  -   Putting on and  taking off regular lower body clothing?: A Little  -   Bathing (including washing, rinsing, drying)?: A Little  -   Toileting, which includes using toilet, bedpan or urinal? : A Little  -   Putting on and taking off regular upper body clothing?: A Little  -   Taking care of personal grooming such as brushing teeth?: A Little  -   Eating meals?: A Little    AM-PAC Score:  Score: 18  Approx Degree of Impairment: 46.65%  Standardized Score (AM-PAC Scale): 38.66    ADDITIONAL TESTS     NEUROLOGICAL FINDINGS      COGNITION ASSESSMENTS       PLAN  OT Treatment Plan: Functional transfer training;ADL training;Endurance training;Patient/Family education;Patient/Family training;Equipment eval/education  Rehab Potential : Fair  Frequency: 3x/week  Number of Visits to Meet Established Goals: 3    ADL Goals   Patient will perform upper body dressing:  with supervision  Patient will perform lower body dressing:  with stand by assist and with cues  Patient will perform toileting: with supervision    Functional Transfer Goals  Patient will transfer to toilet:  with supervision    UE Exercise Program Goal  Patient will be supervision with bilateral AROM HEP (home exercise program).      Patient Evaluation Complexity Level:   Occupational Profile/Medical History LOW - Brief history including review of medical or therapy records    Specific performance deficits impacting engagement in ADL/IADL LOW  1 - 3 performance deficits    Client Assessment/Performance Deficits MODERATE - Comorbidities and min to mod modifications of tasks    Clinical Decision Making LOW - Analysis of occupational profile, problem-focused assessments, limited treatment options    Overall Complexity LOW     OT Session Time: 15 minutes  Self-Care Home Management: 2 minutes  Therapeutic Activity: 10 minutes

## 2024-02-02 NOTE — TELEPHONE ENCOUNTER
Spoke with Haley Dickinson. Advised to contact nephrologist for catheter flushing orders per Holley Chavez PA-C. Faxed last office note to [phone] per Haley request.

## 2024-02-02 NOTE — TELEPHONE ENCOUNTER
Nurse Haley called, wanted to know if she has clearance to flush pt's catheter.   Please advise     Thank you     P:[phone]

## 2024-02-02 NOTE — HOME CARE LIAISON
Received referral via [facility] for Home Health services. SW spoke w/ patient who is agreeable with [facility]. Patient and spouse not available. Contact information placed on AVS.

## 2024-02-02 NOTE — TELEPHONE ENCOUNTER
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 Will continue donepezil and memantine. He has hearing aids but doesn't like to wear them.  He will cont to follow w [facility] at [facility].  I support changing HD to PD for palliative and QOL reasons, and I advised wife to d/w renal and txpt regarding re-adjustment of all medications once he does start PD.     Sundowning / AMS.  Additional of GBP and risperdal around HD in Oct 2023 may have caused AMS admission in Nov 2023.  Would cont GBP around HD, would not add back risperdal in daytime.     His recent episodes in Fall 2023 (gait/headache/vision change, and then AMS) are not necessarily primarily neurological.  Would not change vascular mgmt based on imaging or symptoms.  Carotid stenosis will be monitored clinically.     We discussed medication side effects and activity precautions. We discussed symptoms that would warrant urgent/emergent evaluation. Patient verbalized understanding and agreement.     To summarize medical decision making:  I spent 45 minutes on the day of the encounter related to this visit, not including separately reported activities or procedures.

## 2024-02-02 NOTE — PLAN OF CARE
Pt alert and oriented x 2; confused/pleasant  RA  Tele-NSR  Accucheck QID  Pt removed IV this morning  DC home today-continue outpatient HD schedule  Renal Diet  Heparin VTE  Pt to bathroom SB with walker  Denies    Problem: METABOLIC/FLUID AND ELECTROLYTES - ADULT  Goal: Electrolytes maintained within normal limits  Description: INTERVENTIONS:  - Monitor labs and rhythm and assess patient for signs and symptoms of electrolyte imbalances  - Administer electrolyte replacement as ordered  - Monitor response to electrolyte replacements, including rhythm and repeat lab results as appropriate  - Fluid restriction as ordered  - Instruct patient on fluid and nutrition restrictions as appropriate  Outcome: Progressing  Goal: Hemodynamic stability and optimal renal function maintained  Description: INTERVENTIONS:  - Monitor labs and assess for signs and symptoms of volume excess or deficit  - Monitor intake, output and patient weight  - Monitor urine specific gravity, serum osmolarity and serum sodium as indicated or ordered  - Monitor response to interventions for patient's volume status, including labs, urine output, blood pressure (other measures as available)  - Encourage oral intake as appropriate  - Instruct patient on fluid and nutrition restrictions as appropriate  Outcome: Progressing     Problem: Safety Risk - Non-Violent Restraints  Goal: Patient will remain free from self-harm  Description: INTERVENTIONS:  - Apply the least restrictive restraint to prevent harm  - Notify patient and family of reasons restraints applied  - Assess for any contributing factors to confusion (electrolyte disturbances, delirium, medications)  - Discontinue any unnecessary medical devices as soon as possible  - Assess the patient's physical comfort, circulation, skin condition, hydration, nutrition and elimination needs   - Reorient and redirection as needed  - Assess for the need to continue restraints  Outcome: Progressing

## 2024-02-02 NOTE — PAYOR COMM NOTE
--------------  ADMISSION REVIEW     Payor: [employer] AD[employer] PPO  Subscriber #:  [id number]  Authorization Number: [id number]    Admit date: 1/31/24  Admit time:  3:28 PM       REVIEW DOCUMENTATION:     ED Provider Notes        ED Provider Notes signed by Mil Lynn MD at 1/31/2024  1:49 PM       Author: Mil Lynn MD Service: -- Author Type: Physician    Filed: 1/31/2024  1:49 PM Date of Service: 1/31/2024 11:02 AM Status: Signed    : Mil Lynn MD (Physician)           Patient Seen in: [facility] Emergency Department      History     Chief Complaint   Patient presents with    Weakness     Stated Complaint: leg weakness, hx dementia    Subjective:   HPI    Patient with a history of chronic kidney disease and heart failure.  Patient had a peritoneal dialysis catheter placed earlier this month 1/26 and he was to the emergency department just 4 days ago with bleeding at the site.  Patient's wife reported that patient has a history of dementia and may have been tugging at the catheter.  Bleeding was controlled and patient referred back to his surgeon.  Patient was seen in the office yesterday.  Some ecchymosis and hematoma were noted but otherwise patient seem to be doing well.  Wife called his providers nurse reporting that patient was so weak he cannot lift himself off the toilet today.  She was able to eventually help him up and get him to bed.  He was reporting soreness in his legs.  His speech seemed a little slurred.  Wife reported to our nursing staff that patient would start talking and seemed to \"zone out \".  It is noted patient missed his last 2 dialysis sessions.  He has dialysis Tuesday, Thursday, and Saturday.  He is due for dialysis tomorrow  Wife notes that he coughs chronically.  No change in his cough.  No high fever.  No vomiting.  Wife notes that both of his legs are sore to touch  Patient does not make urine        Objective:   Past Medical History:   Diagnosis Date    Back  problem     BPH (benign prostatic hyperplasia)     Cataract     CKD (chronic kidney disease)     Congestive heart disease (HCC)     before transplant in     Dialysis patient ([facility]) 2023    HEMODIALYSIS TUES/THURS/SAT 3.5HRS    Esophageal reflux     Fistula     RIGHT ARM    Glaucoma     Gout     Hearing impairment     HA's x2, doesnt wear    Heart transplanted ([facility]) 2006-@ [facility]    High blood pressure     High cholesterol     Hypercholesterolemia     Muscle weakness     uses a cane- balance issues    Obstructive sleep apnea syndrome 2009    Problems with swallowing     Renal disorder     CKD    Short-term memory loss     Shortness of breath     Sleep apnea     had UPPP did not need device after    Visual impairment     glasses              Past Surgical History:   Procedure Laterality Date    ARTHROSCOPY OF JOINT UNLISTED Right     rotator cuff repair    AV FISTULA REVISION, OPEN      incorrect info per wife    COLONOSCOPY      HEART TRANSPLANT      [facility]     KNEE REPLACEMENT SURGERY Left 2018    x2    OTHER SURGICAL HISTORY  2021    Cysto Dr. Delgado    ROTATOR CUFF REPAIR      SINUS SURGERY        TONSILLECTOMY      TOTAL KNEE REPLACEMENT Left     2 on this leg    UPPER GI ENDOSCOPY,EXAM                  Social History     Socioeconomic History    Marital status:    Tobacco Use    Smoking status: Former     Types: Cigarettes     Quit date:      Years since quittin.0    Smokeless tobacco: Never   Vaping Use    Vaping Use: Never used   Substance and Sexual Activity    Alcohol use: Yes     Comment: 1 beer per day    Drug use: Not Currently    Sexual activity: Yes   Other Topics Concern    Caffeine Concern Yes     Comment: rarely    Exercise No     Social Determinants of Health     Food Insecurity: No Food Insecurity (11/3/2023)    Food Insecurity     Food Insecurity: Never true   Transportation Needs: No Transportation Needs (11/3/2023)    Transportation  Needs     Lack of Transportation: No   Housing Stability: Low Risk  (11/3/2023)    Housing Stability     Housing Instability: No              Review of Systems    Positive for stated complaint: leg weakness, hx dementia  Other systems are as noted in HPI.  Constitutional and vital signs reviewed.      All other systems reviewed and negative except as noted above.    Physical Exam     ED Triage Vitals [01/31/24 1047]   /88   Pulse 94   Resp 18   Temp 98.5 °F (36.9 °C)   Temp src Temporal   SpO2 94 %   O2 Device None (Room air)       Current:BP (!) 132/96   Pulse 88   Temp 98.5 °F (36.9 °C) (Temporal)   Resp 11   Ht 180.3 cm (5' 11\")   Wt 83.9 kg   SpO2 100%   BMI 25.80 kg/m²         Physical Exam  General: The patient is awake and alert.  He will follow simple commands.  There is facial symmetry.  Eyes: sclera white, conjunctiva pink and moist.  Lids and lashes are normal.  Pupils are constricted but round and extraocular movements are intact  Throat: Posterior pharynx is normal.  Tongue protrudes midline  Neck: With no extraordinary JVD  Lungs: Diminished to auscultation bilaterally.  No rhonchi or rales.  Heart: Normal S1 and S2, without murmur.  Distal pulses are strong and symmetric.  Abdomen: Soft, nondistended.  Very mildly tender diffusely.  Peritoneal catheter noted in the left abdomen site appears nonerythematous  Extremities: Unremarkable.  Calves slightly swollen with trace to 1+ edema and mildly diffusely tender  Neurologic:  Mental status as above.  Patient moves all extremities with reasonable strength.  He can show me 2 fingers.  He can lift his arms and legs off the cart without any assistant and even sit up on the cart without assistance.  Finger-nose coordination intact.         ED Course     Labs Reviewed   COMP METABOLIC PANEL (14) - Abnormal; Notable for the following components:       Result Value    Potassium 6.2 (*)      (*)     Creatinine 17.60 (*)     Calcium, Total 7.5  (*)     Calculated Osmolality 320 (*)     eGFR-Cr 2 (*)     AST 9 (*)     Alkaline Phosphatase 159 (*)     Albumin 3.0 (*)     A/G Ratio 0.9 (*)     All other components within normal limits   CBC W/ DIFFERENTIAL - Abnormal; Notable for the following components:    RBC 3.21 (*)     HGB 9.6 (*)     HCT 27.1 (*)     PLT 96.0 (*)     Lymphocyte Absolute 0.99 (*)     All other components within normal limits   TROPONIN I HIGH SENSITIVITY - Normal   SARS-COV-2/FLU A AND B/RSV BY PCR (GENEXPERT) - Normal                                                  Laboratory work 4 days ago showed:  near normal electrolytes.  Creatinine was 12.3  CBC showed normal white count.  Hemoglobin of 11.  Platelet count 120      An EKG was performed. I agree with computerized EKG interval interpretations. EKG shows wide-complex with flattened P waves and  right bundle branch block morphology and nonspecific change. There are no acute ST changes to suggest acute ischemia or infarct  Ventricular rate 91 bpm. WV interval . QRS duration 156 ms.        MDM      Patient with some alteration of awareness and generalized weakness.  I suspect this is related to his missing dialysis for almost a week.  Metabolic abnormalities such as hyperkalemia also consideration.  Occult infectious etiologies such as pneumonia or even COVID infections are consideration as patient has had some cough.  Patient with no focal weakness at this point and my suspicion for CVA would be low.  Bilateral lower extremity tenderness in the setting of postoperative.  Suggest possibility of DVT and ultrasound imaging reasonable.  As noted above, abdominal examination with only mild diffuse tenderness.  No evidence of peritonitis    Patient was closely monitored.    Flu swab/COVID test negative  Troponin negative  Metabolic panel shows normal blood sugar.  Potassium is 6.2.  Creatinine markedly elevated 17  CBC shows normal white count.  Hemoglobin of 9.6 similar to baseline of  about 10 or 11  Platelet 96 also similar to baseline ranging from     Chest x-ray  I personally reviewed the actual radiographs themselves and my individual interpretation shows some atelectasis or even early infiltrate on the left  radiologist's formal interpretation which I have reviewed  CONCLUSION:    1. Left basilar atelectasis.   2. Possible mild basilar pneumonia.     CT brain  CONCLUSION:  No acute intracranial pathology.     Bilateral venous Doppler  CONCLUSION:  No acute deep vein thrombosis.       Admission disposition: 1/31/2024  1:49 PM       I spoke with Dr. Shepherd, nephrology.  He will arrange for dialysis and agrees with treatment of the hyperkalemia  I spoke with duly hospitalist, Dr. Junior.  He will admit    With patient's cough being chronic and no fever or white count, I will hold off on antibiotic treatment for the possible pneumonia identified by radiology on chest x-ray      Disposition and Plan     Clinical Impression:  1. Hyperkalemia    2. Weakness    3. Renal failure, chronic, stage 5 (HCC)         Disposition:  Admit  1/31/2024  1:49 pm    Hospital Problems       Present on Admission  Date Reviewed: 1/30/2024            ICD-10-CM Noted POA    * (Principal) Hyperkalemia E87.5 1/31/2024 Unknown                     Signed by Mil Lynn MD on 1/31/2024  1:49 PM         MEDICATIONS ADMINISTERED IN LAST 1 DAY:  allopurinol (Zyloprim) tab 50 mg       Date Action Dose Route User    2/2/2024 0910 Given 50 mg Oral Angelica Newberry RN          donepezil (Aricept) tab 10 mg       Date Action Dose Route User    2/1/2024 2033 Given 10 mg Oral Kalpana Montague RN          epoetin jevon (Epogen, Procrit) 42550 UNIT/ML injection 10,000 Units       Date Action Dose Route User    2/1/2024 1654 Given 10,000 Units Intravenous Katherine Frazier RN          gabapentin (Neurontin) cap 100 mg       Date Action Dose Route User    2/2/2024 0910 Given 100 mg Oral Angelica Newberry, MCKINLEY           memantine (Namenda) tab 10 mg       Date Action Dose Route User    2/1/2024 2033 Given 10 mg Oral Kalpana Montague RN          QUEtiapine (SEROquel) tab 25 mg       Date Action Dose Route User    2/2/2024 0000 Given 25 mg Oral Kalpana Montague RN          risperiDONE (RisperDAL) tab 0.75 mg       Date Action Dose Route User    2/1/2024 2033 Given 0.75 mg Oral Kalpana Montague RN          tacrolimus (Prograf) cap 0.5 mg       Date Action Dose Route User    2/2/2024 0910 Given 0.5 mg Oral Angelica Newberry RN          tacrolimus (Prograf) cap 1 mg       Date Action Dose Route User    2/2/2024 0910 Given 1 mg Oral Angelica Newberry RN    2/1/2024 2032 Given 1 mg Oral Kalpana Montague RN            Vitals (last day)       Date/Time Temp Pulse Resp BP SpO2 Weight O2 Device O2 Flow Rate (L/min) [facility]    02/02/24 0930 98.5 °F (36.9 °C) 85 20 133/85 -- -- None (Room air) --     02/02/24 0500 98.2 °F (36.8 °C) 86 19 126/79 96 % -- None (Room air) 0 L/min NN    02/01/24 2330 97.9 °F (36.6 °C) 85 19 149/74 95 % -- None (Room air) 0 L/min NN    02/01/24 2300 -- 88 -- -- -- -- -- -- NN    02/01/24 2245 -- 89 -- -- -- -- -- -- NN    02/01/24 2230 -- 87 -- -- -- -- -- -- NN    02/01/24 2215 -- 90 -- -- -- -- -- -- NN    02/01/24 2200 -- 90 -- -- -- -- -- -- NN    02/01/24 2145 -- 87 -- -- 100 % -- -- -- NN    02/01/24 2130 -- 86 -- -- 100 % -- -- -- NN    02/01/24 2115 -- 88 -- -- 100 % -- -- -- NN    02/01/24 1930 98.4 °F (36.9 °C) 84 18 106/72 96 % -- None (Room air) 0 L/min NN    02/01/24 1900 -- 83 -- -- -- -- -- -- NN    02/01/24 1845 -- 87 -- -- -- -- -- -- NN    02/01/24 1830 -- 98 -- -- -- -- -- -- NN    02/01/24 1815 -- 89 -- -- -- -- -- -- NN    02/01/24 1800 -- 89 -- -- -- -- -- -- NN    02/01/24 1745 -- 92 -- -- -- -- -- -- NN    02/01/24 1730 -- 97 -- -- -- -- -- -- NN    02/01/24 1715 -- 96 -- -- -- -- -- -- NN    02/01/24 1700 -- 97 -- -- 94 % -- -- -- NN    02/01/24 1645 -- 90 -- -- 94 % -- -- -- NN     02/01/24 1630 98.8 °F (37.1 °C) 90 16 131/86 97 % -- None (Room air) 0 L/min ND    02/01/24 1615 -- 90 -- -- 95 % -- -- -- NN    02/01/24 1600 -- 90 -- -- 94 % -- -- -- NN    02/01/24 1130 98.6 °F (37 °C) 80 19 124/75 94 % -- None (Room air) 0 L/min ND    02/01/24 0942 -- 95 -- -- 95 % -- -- -- ND    02/01/24 0852 -- 88 -- -- -- -- -- -- NC    02/01/24 0800 98.8 °F (37.1 °C) 75 17 127/85 94 % -- None (Room air) 0 L/min ND    02/01/24 0400 98.6 °F (37 °C) 90 19 156/99 93 % 189 lb None (Room air) -- AS    02/01/24 0000 97.8 °F (36.6 °C) 95 19 150/88 94 % -- None (Room air) -- AS       1/31 H&P       PCP: Anoop Keys MD        History of Present Illness: Patient is a 78 year old male with PMH sig for orthotopic heart transplant, end-stage renal disease on hemodialysis, hypertension, hyperlipidemia, dementia presented after missing 2 rounds of dialysis.  He recently had peritoneal dialysis placed, there was accidental tugging of the catheter for which she had to see surgery in clinic through this process ended up missing 2 rounds of dialysis.  The night before he was very short of breath, weak and his legs were swollen according to his wife.  He is a poor historian currently.  In the ER his vitals were stable.  Creatinine was 17.  Nephrology was consulted and plan is for dialysis later today.      Assessment/Plan:      78 year old male with PMH sig for orthotopic heart transplant, end-stage renal disease on hemodialysis, hypertension, hyperlipidemia, dementia presented after missing 2 rounds of dialysis.     Missed HD x2  ESRD on HD  - plan for HD tonight per nephrology  - monitor I/Os     S/p OHT  - no cardiac symptoms  - troponin negative  - cont PTA tacro dosing     Dementia   - aricept, namendea, risperidone      FEN: regular diet, PT/OT  Proph: SCDs, heparin  Code status: DNAR     Outpatient records or previous hospital records reviewed.   [facility] hospitalist to continue to follow patient while in house      2/1  NEPHROLOGY CONSULT NOTE      Reason for Consultation:  ESRD     History of Present Illness:  Nader Campbell is a a(n) 78 year old male with hx of ESRD on HD - planning to transition to PD (has PD cath placed 1/26), Heart tx, dementia, who is admitted to hospital w/ weakness.  Patient had missed 2 HD sessions- typically TTS.   He was dialyzed yesterday- feels better overall  He is a poor historian generally     Care reviewed w/ RN @ bedside      Physical Exam:  Vital signs: Blood pressure 127/85, pulse 95, temperature 98.8 °F (37.1 °C), temperature source Oral, resp. rate 17, height 5' 11\" (1.803 m), weight 189 lb (85.7 kg), SpO2 95%.      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   More alert today.  Pleasantly confused.  Wife bedside.  Denies CP/SOB.  NAD.         OBJECTIVE:  Temp:  [97.8 °F (36.6 °C)-98.8 °F (37.1 °C)] 98.8 °F (37.1 °C)  Pulse:  [75-96] 95  Resp:  [10-19] 17  BP: ()/(43-99) 127/85  SpO2:  [91 %-100 %] 95 %    Recent Labs   Lab 01/26/24  1010 01/27/24  0322 01/31/24  1131 02/01/24  0825   WBC 4.0 4.5 4.0 4.0   HGB 11.3* 11.0* 9.6* 10.4*   MCV 84.7 85.4 84.4 84.1   .0* 120.0*  96.0* 98.0*                Recent Labs   Lab 01/26/24  1010 01/27/24  0322 01/31/24  1131 02/01/24  0826    136 139 136   K 4.8 4.9 6.2* 5.4*    96* 105 100   CO2 33.0* 32.0 22.0 29.0   BUN 45* 57* 104* 58*   CREATSERUM 10.50* 12.30* 17.60* 12.10*   CA 8.8 8.7 7.5* 8.7   MG  --   --   --  2.6   GLU 95 137* 95 98              Recent Labs   Lab 01/31/24  1131 02/01/24  0826   ALT  --  6*   AST 9* 14*   ALB 3.0* 3.1*               Recent Labs   Lab 01/31/24  1403 01/31/24  1447 02/01/24  1153   PGLU 97 108* 144*       Assessment/Plan:  Principal Problem:    Hyperkalemia  Active Problems:    Weakness    Renal failure, chronic, stage 5 (HCC)     78 year old male with PMH sig for orthotopic heart transplant, end-stage renal disease on hemodialysis, hypertension, hyperlipidemia, dementia presented after missing 2 rounds of dialysis.     Missed HD x2  ESRD on HD  - repeat HD today per nephrology  - monitor I/Os     S/p OHT  - no cardiac symptoms  - troponin negative  - cont PTA tacro dosing     Dementia   - aricept, namendea, risperidone      FEN: regular diet, PT/OT  Proph: SCDs, heparin  Code status: DNAR

## 2024-02-02 NOTE — CM/SW NOTE
CM updated [facility] on patient discharge status for agency follow up.      Amina Waller RN Case Manager [phone]

## 2024-02-02 NOTE — PROGRESS NOTES
NURSING ADMISSION NOTE      Patient admitted via Cart  Oriented to room.  Safety precautions initiated.  Bed in low position.  Call light in reach.    Pt arrived drowsy.  RA  Tele- Afib  1L per NC  NPO until SLP eval  Cardiac Electrolyte Protocol  Potassium being replaced  Eliquis  PIV right and left FA  EEG ordered  IV Zosyn for Pneumonia

## 2024-02-05 RX ORDER — RISPERIDONE 0.5 MG/1
TABLET ORAL
Qty: 60 TABLET | Refills: 11 | Status: SHIPPED | OUTPATIENT
Start: 2024-02-05

## 2024-02-05 NOTE — TELEPHONE ENCOUNTER
Dispensed Written Strength Quantity Refills Days Supply Provider Pharmacy   RISPERIDONE 0.25MG TABLETS 01/23/2024 05/08/2023  90 each  30 Jorge Echols MD [facility] #...   RISPERIDONE 0.5MG TABLETS 01/09/2024 12/05/2023  60 each  30 Jorge Echols MD [facility] #...   RISPERIDONE 0.5MG TABLETS 12/05/2023 12/05/2023  60 each  30 Jorge Echols MD [facility] #...   RISPERIDONE 0.5MG TABLETS 11/08/2023 11/07/2023  60 each  30 Gus Andrade MD [facility] #...   RISPERIDONE  0.25 MG TABS 10/25/2023 10/24/2023  60 tablet  20 Robert Gutierrez MD [facility] #...   RISPERIDONE 0.25MG TABLETS 10/25/2023 10/24/2023  60 each  20 Robert Gutierrez MD [facility] #...   RISPERIDONE 0.25MG TABLETS 09/09/2023 05/08/2023  180 each  60 Jorge Echols MD [facility] #...   RISPERIDONE  0.25 MG TABS 09/08/2023 05/08/2023  180 tablet  60 Jorge Echols MD

## 2024-02-05 NOTE — PAYOR COMM NOTE
--------------  DISCHARGE REVIEW    Payor: [employer] ADV PPO  Subscriber #:  [id number]  Authorization Number: [id number]    Admit date: 1/31/24  Admit time:   3:28 PM  Discharge Date: 2/2/2024 12:46 PM     Admitting Physician: Molina Hart MD  Attending Physician:  No att. providers found  Primary Care Physician: Anoop Keys MD          Discharge Summary Notes        Discharge Summary signed by Jenny Wilson MD at 2/2/2024 11:27 AM       Author: Jenny Wilson MD Specialty: Internal Medicine Author Type: Physician    Filed: 2/2/2024 11:27 AM Date of Service: 2/2/2024 11:25 AM Status: Signed    : Jenny Wilson MD (Physician)                                                          [facility] Discharge Summary     Patient ID:  Nader Campbell  78 year old  8/10/1945    Admit date: 1/31/2024    Discharge date and time: 02/02/24       Attending Physician: Jenny Wilson*     Primary Care Physician: Anoop Keys MD     Discharge Diagnoses: Hyperkalemia [E87.5]  Weakness [R53.1]  Renal failure, chronic, stage 5 (HCC) [N18.5]    Please note that only IHP DMG and EMG patients enrolled in the Medicare ACO, [facility] ACO and [facility] HMOs will be handled by the [facility] Care Management team.  For all other patients, please follow usual protocol for discharge care transition.    Discharge Condition: stable    Disposition:  home    Important Follow up:  - PCP within 2 weeks              Hospital Course:      78 year old male with PMH sig for orthotopic heart transplant, end-stage renal disease on hemodialysis, hypertension, hyperlipidemia, dementia presented after missing 2 rounds of dialysis.  He recently had peritoneal dialysis placed, there was accidental tugging of the catheter for which she had to see surgery in clinic through this process ended up missing 2 rounds of dialysis.  The night before he was very short of breath, weak and his legs  were swollen according to his wife.  He is a poor historian currently.  In the ER his vitals were stable.  Creatinine was 17.  Nephrology was consulted and plan is for dialysis later today.    Missed HD x2  ESRD on HD  - HD on admission evening and following day per nephrology  - monitor I/Os  - Peritoneal dialysis cath management per renal      S/p OHT  - no cardiac symptoms  - troponin negative  - cont PTA tacro dosing     Dementia   - aricept, namendea, risperidone       Consults: IP CONSULT TO HOSPITALIST  IP CONSULT TO NEPHROLOGY  IP CONSULT TO SOCIAL WORK    Operative Procedures:        Patient instructions:      I as the attending physician reconciled the current and discharge medications on day of discharge.     Current Discharge Medication List        CONTINUE these medications which have NOT CHANGED    Details   senna-docusate 8.6-50 MG Oral Tab Take 1 tablet by mouth daily.      HYDROcodone-acetaminophen 5-325 MG Oral Tab Take 1-2 tablets by mouth every 6 (six) hours as needed for Pain.      memantine 10 MG Oral Tab Take 1 tablet (10 mg total) by mouth daily.      risperiDONE 0.5 MG Oral Tab Take 1.5 tablets (0.75 mg total) by mouth nightly. May also take 0.5 tablet (0.25 mg total) daily as needed (agitation).      !! tacrolimus 1 MG Oral Cap Take 1 capsule (1 mg total) by mouth 2 (two) times daily. FOR HEART TRANSPLANT  PATIENT TAKES TACROLIMUS 1.5 MG IN THE AM AND 1 MG IN THE EVENING      sevelamer carbonate 800 MG Oral Tab Take 1 tablet (800 mg total) by mouth 3 (three) times daily with meals.      gabapentin 100 MG Oral Cap Take 1 capsule (100 mg total) by mouth 3 (three) times a week.      Nephro-Jaime 0.8 MG Oral Tab Take 1 tablet (0.8 mg total) by mouth daily.      !! tacrolimus 0.5 MG Oral Cap Take 1 capsule (0.5 mg total) by mouth daily. FOR HEART TRANSPLANT      donepezil 10 MG Oral Tab Take 1 tablet (10 mg total) by mouth nightly.      hydrOXYzine 25 MG Oral Tab Take 1 tablet (25 mg total) by  mouth every 8 (eight) hours as needed for Itching.      Pravastatin Sodium 20 MG Oral Tab Take 1 tablet (20 mg total) by mouth nightly.      allopurinol 100 MG Oral Tab Take 0.5 tablets (50 mg total) by mouth daily.       !! - Potential duplicate medications found. Please discuss with provider.          Activity: activity as tolerated  Diet: regular diet  Wound Care: as directed  Code Status: DNAR/Selective Treatment      Discharge Exam:     General: no acute distress, alert and oriented x 2  Heart: RRR  Lungs: clear bilaterally, no active wheezing  Abdomen: nontender, nondistended, intact BS  Extremities: no pedal edema   Neuro: CN inact, no focal deficits      Total time coordinating care for discharge: Greater than 30 minutes    Sarita Wilson MD  [facility]        Electronically signed by Jenny Wilson MD on 2/2/2024 11:27 AM         REVIEWER COMMENTS

## 2024-02-09 ENCOUNTER — HOSPITAL ENCOUNTER (INPATIENT)
Facility: HOSPITAL | Age: 79
LOS: 1 days | Discharge: HOME | End: 2024-02-10
Attending: EMERGENCY MEDICINE | Admitting: INTERNAL MEDICINE
Payer: MEDICARE

## 2024-02-09 ENCOUNTER — APPOINTMENT (OUTPATIENT)
Dept: CT IMAGING | Facility: HOSPITAL | Age: 79
End: 2024-02-09
Attending: EMERGENCY MEDICINE
Payer: MEDICARE

## 2024-02-09 ENCOUNTER — APPOINTMENT (OUTPATIENT)
Dept: GENERAL RADIOLOGY | Facility: HOSPITAL | Age: 79
End: 2024-02-09
Attending: EMERGENCY MEDICINE
Payer: MEDICARE

## 2024-02-09 ENCOUNTER — HOSPITAL ENCOUNTER (INPATIENT)
Facility: HOSPITAL | Age: 79
LOS: 1 days | Discharge: HOME HEALTH | End: 2024-02-10
Attending: EMERGENCY MEDICINE | Admitting: INTERNAL MEDICINE
Payer: MEDICARE

## 2024-02-09 DIAGNOSIS — N18.6 ESRD (END STAGE RENAL DISEASE) ON DIALYSIS (HCC): ICD-10-CM

## 2024-02-09 DIAGNOSIS — R25.2 MUSCLE CRAMPING: ICD-10-CM

## 2024-02-09 DIAGNOSIS — Z99.2 ESRD (END STAGE RENAL DISEASE) ON DIALYSIS (HCC): ICD-10-CM

## 2024-02-09 DIAGNOSIS — R55 SYNCOPE AND COLLAPSE: Primary | ICD-10-CM

## 2024-02-09 PROBLEM — R09.89 LABILE HYPERTENSION: Status: ACTIVE | Noted: 2024-02-09

## 2024-02-09 PROBLEM — T85.611A PD CATHETER DYSFUNCTION (HCC): Status: ACTIVE | Noted: 2024-02-09

## 2024-02-09 LAB
ANION GAP SERPL CALC-SCNC: 6 MMOL/L (ref 0–18)
ATRIAL RATE: 79 BPM
BASOPHILS # BLD AUTO: 0.03 X10(3) UL (ref 0–0.2)
BASOPHILS NFR BLD AUTO: 0.6 %
BUN BLD-MCNC: 32 MG/DL (ref 9–23)
CALCIUM BLD-MCNC: 8.9 MG/DL (ref 8.5–10.1)
CHLORIDE SERPL-SCNC: 96 MMOL/L (ref 98–112)
CO2 SERPL-SCNC: 34 MMOL/L (ref 21–32)
CREAT BLD-MCNC: 8.17 MG/DL
EGFRCR SERPLBLD CKD-EPI 2021: 6 ML/MIN/1.73M2 (ref 60–?)
EOSINOPHIL # BLD AUTO: 0.25 X10(3) UL (ref 0–0.7)
EOSINOPHIL NFR BLD AUTO: 4.9 %
ERYTHROCYTE [DISTWIDTH] IN BLOOD BY AUTOMATED COUNT: 14.7 %
GLUCOSE BLD-MCNC: 89 MG/DL (ref 70–99)
HCT VFR BLD AUTO: 30.6 %
HGB BLD-MCNC: 10.9 G/DL
IMM GRANULOCYTES # BLD AUTO: 0.02 X10(3) UL (ref 0–1)
IMM GRANULOCYTES NFR BLD: 0.4 %
LYMPHOCYTES # BLD AUTO: 1.15 X10(3) UL (ref 1–4)
LYMPHOCYTES NFR BLD AUTO: 22.6 %
MCH RBC QN AUTO: 30.4 PG (ref 26–34)
MCHC RBC AUTO-ENTMCNC: 35.6 G/DL (ref 31–37)
MCV RBC AUTO: 85.5 FL
MONOCYTES # BLD AUTO: 0.46 X10(3) UL (ref 0.1–1)
MONOCYTES NFR BLD AUTO: 9.1 %
NEUTROPHILS # BLD AUTO: 3.17 X10 (3) UL (ref 1.5–7.7)
NEUTROPHILS # BLD AUTO: 3.17 X10(3) UL (ref 1.5–7.7)
NEUTROPHILS NFR BLD AUTO: 62.4 %
OSMOLALITY SERPL CALC.SUM OF ELEC: 288 MOSM/KG (ref 275–295)
P AXIS: 91 DEGREES
P-R INTERVAL: 168 MS
PLATELET # BLD AUTO: 102 10(3)UL (ref 150–450)
POTASSIUM SERPL-SCNC: 4.1 MMOL/L (ref 3.5–5.1)
Q-T INTERVAL: 484 MS
QRS DURATION: 144 MS
QTC CALCULATION (BEZET): 554 MS
R AXIS: 89 DEGREES
RBC # BLD AUTO: 3.58 X10(6)UL
SODIUM SERPL-SCNC: 136 MMOL/L (ref 136–145)
T AXIS: -63 DEGREES
TROPONIN I SERPL HS-MCNC: 27 NG/L
VENTRICULAR RATE: 79 BPM
WBC # BLD AUTO: 5.1 X10(3) UL (ref 4–11)

## 2024-02-09 PROCEDURE — 70450 CT HEAD/BRAIN W/O DYE: CPT | Performed by: EMERGENCY MEDICINE

## 2024-02-09 PROCEDURE — 72125 CT NECK SPINE W/O DYE: CPT | Performed by: EMERGENCY MEDICINE

## 2024-02-09 PROCEDURE — 73030 X-RAY EXAM OF SHOULDER: CPT | Performed by: EMERGENCY MEDICINE

## 2024-02-09 PROCEDURE — 99223 1ST HOSP IP/OBS HIGH 75: CPT | Performed by: INTERNAL MEDICINE

## 2024-02-09 PROCEDURE — 71045 X-RAY EXAM CHEST 1 VIEW: CPT | Performed by: EMERGENCY MEDICINE

## 2024-02-09 RX ORDER — SEVELAMER CARBONATE 800 MG/1
800 TABLET, FILM COATED ORAL
Status: DISCONTINUED | OUTPATIENT
Start: 2024-02-10 | End: 2024-02-10

## 2024-02-09 RX ORDER — HYDROMORPHONE HYDROCHLORIDE 1 MG/ML
0.5 INJECTION, SOLUTION INTRAMUSCULAR; INTRAVENOUS; SUBCUTANEOUS ONCE
Status: COMPLETED | OUTPATIENT
Start: 2024-02-09 | End: 2024-02-09

## 2024-02-09 RX ORDER — ALBUMIN (HUMAN) 12.5 G/50ML
25 SOLUTION INTRAVENOUS
Status: DISCONTINUED | OUTPATIENT
Start: 2024-02-09 | End: 2024-02-10

## 2024-02-09 RX ORDER — TACROLIMUS 0.5 MG/1
0.5 CAPSULE ORAL 2 TIMES DAILY
Status: DISCONTINUED | OUTPATIENT
Start: 2024-02-09 | End: 2024-02-10

## 2024-02-09 RX ORDER — HYDROCODONE BITARTRATE AND ACETAMINOPHEN 5; 325 MG/1; MG/1
2 TABLET ORAL EVERY 4 HOURS PRN
Status: DISCONTINUED | OUTPATIENT
Start: 2024-02-09 | End: 2024-02-10

## 2024-02-09 RX ORDER — HYDROXYZINE HYDROCHLORIDE 25 MG/1
25 TABLET, FILM COATED ORAL EVERY 8 HOURS PRN
Status: DISCONTINUED | OUTPATIENT
Start: 2024-02-09 | End: 2024-02-10

## 2024-02-09 RX ORDER — LIDOCAINE AND PRILOCAINE 25; 25 MG/G; MG/G
CREAM TOPICAL AS NEEDED
Status: DISCONTINUED | OUTPATIENT
Start: 2024-02-10 | End: 2024-02-10

## 2024-02-09 RX ORDER — ACETAMINOPHEN 500 MG
500 TABLET ORAL EVERY 4 HOURS PRN
Status: DISCONTINUED | OUTPATIENT
Start: 2024-02-09 | End: 2024-02-10

## 2024-02-09 RX ORDER — ACETAMINOPHEN 325 MG/1
650 TABLET ORAL EVERY 4 HOURS PRN
Status: DISCONTINUED | OUTPATIENT
Start: 2024-02-09 | End: 2024-02-10

## 2024-02-09 RX ORDER — SENNOSIDES 8.6 MG
17.2 TABLET ORAL NIGHTLY PRN
Status: DISCONTINUED | OUTPATIENT
Start: 2024-02-09 | End: 2024-02-10

## 2024-02-09 RX ORDER — MEMANTINE HYDROCHLORIDE 10 MG/1
20 TABLET ORAL NIGHTLY
Status: DISCONTINUED | OUTPATIENT
Start: 2024-02-09 | End: 2024-02-10

## 2024-02-09 RX ORDER — ONDANSETRON 2 MG/ML
4 INJECTION INTRAMUSCULAR; INTRAVENOUS EVERY 6 HOURS PRN
Status: DISCONTINUED | OUTPATIENT
Start: 2024-02-09 | End: 2024-02-10

## 2024-02-09 RX ORDER — POLYETHYLENE GLYCOL 3350 17 G/17G
17 POWDER, FOR SOLUTION ORAL DAILY PRN
Status: DISCONTINUED | OUTPATIENT
Start: 2024-02-09 | End: 2024-02-10

## 2024-02-09 RX ORDER — PRAVASTATIN SODIUM 20 MG
20 TABLET ORAL NIGHTLY
Status: DISCONTINUED | OUTPATIENT
Start: 2024-02-09 | End: 2024-02-09

## 2024-02-09 RX ORDER — ALLOPURINOL 100 MG/1
50 TABLET ORAL DAILY
Status: DISCONTINUED | OUTPATIENT
Start: 2024-02-10 | End: 2024-02-10

## 2024-02-09 RX ORDER — HYDROCODONE BITARTRATE AND ACETAMINOPHEN 5; 325 MG/1; MG/1
1 TABLET ORAL EVERY 4 HOURS PRN
Status: DISCONTINUED | OUTPATIENT
Start: 2024-02-09 | End: 2024-02-10

## 2024-02-09 RX ORDER — BISACODYL 10 MG
10 SUPPOSITORY, RECTAL RECTAL
Status: DISCONTINUED | OUTPATIENT
Start: 2024-02-09 | End: 2024-02-10

## 2024-02-09 RX ORDER — TACROLIMUS 1 MG/1
1 CAPSULE ORAL 2 TIMES DAILY
Status: DISCONTINUED | OUTPATIENT
Start: 2024-02-09 | End: 2024-02-10

## 2024-02-09 NOTE — H&P
[facility]   Hospitalist Team  History and Physical  Admit Date:  2/9/2024    Is this a shared or split note between Advanced Practice Provider and Physician? Yes    ASSESSMENT / PLAN:   Nader Campbell is a 78 year old male from home with his wife with a PMHx significant for s/p heart transplant (2006), BPH, ESRD, (on dialysis T, Th, Sat), HTN, HLD, AMANDO, dementia and gout presents to the hospital s/p syncope and collapse.  See below for details.    Syncope and collapse  -syncopal event while walking into dialysis clinic with wife, fell onto right side  -CT brain negative  -CT spine cervical with cervical spondylosis with no acute traumatic injury  -XR shoulder right with no acute fracture or dislocation, arthritic changes, Hill-Sachs deformity right humoral head  -CXR with no acute cardiopulmonary abnormality  -ECG NSR RBBB   -pain control  -PT/OT  -BP ok  -check orthos    ESRD  -has AVF but he has trouble sitting for the dialysis sessions so they are trying to transition to PD at home  -PD line in place (placed on 1/30/24), cuff appears pulled out per renal MD, they will have general surgery evaluate catheter  -normal dialysis schedule T,TH,Sat   -takes norco at home for pain at PD site per the wife  -gabapentin on dialysis days    S/P Heart transplant   -tacrolimus    Dementia  -memantine  -risperidone    HLD  HTN  -statin  -not on anti-hypertensives    Gout  -allopurinol    GOC: DNAR comfort care    GOC discussion with wife Katherine and patient, she states that when he was of sound mind he wanted to be a DNAR comfort care.  She does not want the use of medications in the event of an arrest.     MA/ACO Reach  -Re- Entry: no  -Consults: renal, GS  -Discharge Needs: TBD  -Appointments: TBD      FEN  -lytes in am  -diet-renal    Prophy  -SCD    Dispo  -pending clinical course  PCP: Anoop Keys MD       Outpatient records or previous hospital records reviewed.   Further recommendations pending patient's  clinical course.  [facility] hospitalist  team to continue to follow patient while in house  Concerns regarding plan of care were discussed with patient. Patient agrees with plan as detailed above. Discussed plan of care with Dr. Fagan    Note: This chart was prepared using voice recognition software and may contain unintended word substitution errors.     Gregorio THAKUR  [facility] Hospitalist Team   Available via Perfect Serve or Bubble Chat (check Availability)    2/9/2024      HISTORY:   CC:   Chief Complaint   Patient presents with    Fall     Unwitnessed fall while walking in to dialysis today. Unknown if pt struck his head. Pt is A/OX2 per his norm. No known blood thinners.    Trauma 1 & 2        PCP: Anoop Keys MD    History of Present Illness: Nader Campbell is a 78 year old male from home with his wife with a PMHx significant for s/p heart transplant (2006), BPH, CKD (on dialysis T, Th, Sat), HTN, HLD, AMANDO and gout presents to the hospital s/p syncope and collapse. He had a peritoneal dialysis catheter placed on 1/30/24.  He has been pulling on it and his wife thinks he may have dislodged it. They decided to have the line assessed at the dialysis clinic.  He was walking into he dialysis clinic with his wife Katherine and she said he had a syncopal event and he fell onto his right side. He did not hit his head, CT brain and CT spine negative. He endorsed right shoulder pain, XR negative. Renal was consulted and wanted the patient admitted. General surgery consulted to evaluate PD line which may be dislodged.        Review of Systems  12 point systems reviewed, please see HPI for pertinent positives, otherwise negative    OBJECTIVE:  /80   Pulse 83   Temp 97.9 °F (36.6 °C)   Resp 13   Ht 6' (1.829 m)   Wt 180 lb (81.6 kg)   SpO2 97%   BMI 24.41 kg/m²     GENERAL: no apparent distress  NEUROLOGIC: A/A; Ox2 (baseline): strength normal; sensations intact, no focal deficits  identified  HEENT: normocephalic, normal nose  RESPIRATORY: normal expansion; non labored, CTA   CARDIOVASCULAR: regular,nl S1 S2  ABDOMEN:  Soft, BS+; non distended, PD catheter appears dislodged, redness around insertion site   EXTREMITIES: no LE edema    PMH  Past Medical History:   Diagnosis Date    Back problem     BPH (benign prostatic hyperplasia)     Cataract     CKD (chronic kidney disease)     Congestive heart disease (HCC)     before transplant in 2006    Dialysis patient ([facility]) 07/27/2023    HEMODIALYSIS TUES/THURS/SAT 3.5HRS    Esophageal reflux     Fistula     RIGHT ARM    Glaucoma     Gout     Hearing impairment     HA's x2, doesnt wear    Heart transplanted ([facility]) 2006 2006-@ [facility]    High blood pressure     High cholesterol     Hypercholesterolemia     Muscle weakness     uses a cane- balance issues    Obstructive sleep apnea syndrome 02/04/2009    Problems with swallowing     Renal disorder     CKD    Short-term memory loss     Shortness of breath     Sleep apnea     had UPPP did not need device after    Visual impairment     glasses        PSH  Past Surgical History:   Procedure Laterality Date    ARTHROSCOPY OF JOINT UNLISTED Right     rotator cuff repair    AV FISTULA REVISION, OPEN      incorrect info per wife    COLONOSCOPY      HEART TRANSPLANT  2006    [facility]     KNEE REPLACEMENT SURGERY Left 2018    x2    OTHER SURGICAL HISTORY  08/05/2021    Cysto Dr. Delgado    ROTATOR CUFF REPAIR      SINUS SURGERY        TONSILLECTOMY      TOTAL KNEE REPLACEMENT Left     2 on this leg    UPPER GI ENDOSCOPY,EXAM          ALL:  Allergies   Allergen Reactions    Amlodipine OTHER (SEE COMMENTS) and SWELLING     Leg swelling    Dalteparin HIVES and RASH     Fragmin      Lisinopril OTHER (SEE COMMENTS)     Hyperkalemia     Penicillins HIVES, RASH, SWELLING and SHORTNESS OF BREATH    Oxycodone OTHER (SEE COMMENTS)     Lost his taste and wt loss        Home Medications:  No outpatient medications  have been marked as taking for the 24 encounter (Hospital Encounter).       Soc Hx  Social History     Tobacco Use    Smoking status: Former     Types: Cigarettes     Quit date:      Years since quittin.1    Smokeless tobacco: Never   Substance Use Topics    Alcohol use: Yes     Comment: 1 beer per day        Fam Hx  Family History   Problem Relation Age of Onset    Heart Disorder Father         heart attack    Heart Disorder Mother         CHF         DIAGNOSTIC DATA:   CBC/Chem  Recent Labs   Lab 24  1047   WBC 5.1   HGB 10.9*   MCV 85.5   .0*       Recent Labs   Lab 24  1047      K 4.1   CL 96*   CO2 34.0*   BUN 32*   CREATSERUM 8.17*   GLU 89   CA 8.9       No results for input(s): \"ALT\", \"AST\", \"ALB\", \"AMYLASE\", \"LIPASE\", \"LDH\" in the last 168 hours.    Invalid input(s): \"ALPHOS\", \"TBIL\", \"DBIL\", \"TPROT\"    No results for input(s): \"TROP\" in the last 168 hours.    Additional Diagnostics: ECG: NSR w RBBB    CXR: image personally reviewed normal    Radiology: XR SHOULDER, COMPLETE (MIN 2 VIEWS), RIGHT (CPT=73030)    Result Date: 2024  CONCLUSION:  No acute fracture or dislocation.  Degenerate arthritic changes are seen.  Remote appearing Hill-Sachs deformity seen in the right humeral head.   LOCATION:  [facility]   Dictated by (CST): Paramjit Arias MD on 2024 at 1:32 PM     Finalized by (CST): Paramjit Arias MD on 2024 at 1:34 PM       XR CHEST AP PORTABLE  (CPT=71045)    Result Date: 2024  CONCLUSION:  Old granulomatous disease.  No acute cardiopulmonary abnormality is identified.   LOCATION:  [facility]      Dictated by (CST): Paramjit Arias MD on 2024 at 1:31 PM     Finalized by (CST): Paramjit Arias MD on 2024 at 1:32 PM       CT SPINE CERVICAL (CPT=72125)    Result Date: 2024  CONCLUSION:   Cervical spondylosis without evidence of acute traumatic injury of the cervical spine.    LOCATION:  [facility]   Dictated by (CST): Heather [facility] MD  on 2/09/2024 at 12:23 PM     Finalized by (CST): Heather [facility] MD on 2/09/2024 at 12:26 PM       CT BRAIN OR HEAD (80399)    Result Date: 2/9/2024  CONCLUSION:   1. Negative for acute intracranial process.    LOCATION:  [facility]   Dictated by (CST): Heather [facility] MD on 2/09/2024 at 12:16 PM     Finalized by (CST): Heather [facility] MD on 2/09/2024 at 12:18 PM          SEE ATTENDING NOTE BELOW    Pt seen and examined independently. Daryl Hernandez    Pt is a 79 yo with heart transplant hx, esrd on HD, dementia, who presents with syncope while walking to HD clinic    **syncope  -CT brain, cervical spine without acute issue  -XR shoulder without acute fx  -pain control  -pt/ot  -renal on consult, appreciate   -orthostats pending, possibly related to fluid shifts    ESRD on PD- gen surg consult as PD line cuff out, pt tugging on it at home (dementia)    Dw pt, wife at bedside  Daryl GOMES MD And BLAZE Long    Thank You,  Lula Fagan MD    [facility]  Internal Medicine  Answering Service number: [phone]

## 2024-02-09 NOTE — ED PROVIDER NOTES
Patient Seen in: [facility]      History     Chief Complaint   Patient presents with    Fall     Unwitnessed fall while walking in to dialysis today. Unknown if pt struck his head. Pt is A/OX2 per his norm. No known blood thinners.    Trauma 1 & 2     Stated Complaint: Unwitnessed fall while walking in to dialysis today. Unknown if pt struck his h*    Subjective:   HPI    78-year-old male with history of end-stage disease on hemodialysis Tuesday, Thursday, Saturday, dementia, hypertension, presents to EMS after a fall.  Per EMS patient was walking into have peritoneal dialysis catheter evaluated, he reportedly fell, there was no loss of conscious.  Wife was also injured when the patient fell, she herself broke her arm and was seen in the ER by separate physician, after her evaluation she was able to come to the patient's room and reports that they were walking and together she felt him pulling at her arm and she reported that he was \"passing out \".  She does not believe the patient has had.  Patient is had no fevers, no vomiting or diarrhea.  Wife does not express any other concerns at this time.    Objective:   No pertinent past medical history.            No pertinent past surgical history.              No pertinent social history.            Review of Systems    Positive for stated complaint: Unwitnessed fall while walking in to dialysis today. Unknown if pt struck his h*  Other systems are as noted in HPI.  Constitutional and vital signs reviewed.      All other systems reviewed and negative except as noted above.    Physical Exam     ED Triage Vitals [02/09/24 1044]   /75   Pulse 81   Resp 18   Temp 97.9 °F (36.6 °C)   Temp src    SpO2 94 %   O2 Device None (Room air)       Current:/73   Pulse 79   Temp 97.9 °F (36.6 °C)   Resp 15   Ht 182.9 cm (6')   Wt 81.6 kg   SpO2 95%   BMI 24.41 kg/m²         Physical Exam    GENERAL: Patient is awake, alert,in no acute distress.  HEENT: no  scleral icterus.  Mucous membranes are moist.  Scalp is atraumatic.  NECK: No midline cervical spine tenderness   Back: No midline thoracic or lumbar spine tenderness  HEART: Regular rate and rhythm, no murmurs.  LUNGS: Clear to auscultation bilaterally.  No Rales, no rhonchi, no wheezing, no stridor.  ABDOMEN: Soft, nondistended,non tender  EXTREMITIES: No tenderness to the bilateral clavicles, there is tenderness to the right shoulder, no tenderness to the left shoulder.  No tenderness to bilateral humerus elbow forearm wrist or hand.  No tenderness to the bilateral hips or lower extremities.  SKIN: Warm, dry, intact, no rashes.  NEUROLOGIC EXAM: Tongue midline, no facial drooping, no ptosis, muscle strength +5/5 bilateral upper and lower extremities cerebellar finger to nose intact, no pronator drift, sensation intact.        ED Course     Labs Reviewed   BASIC METABOLIC PANEL (8) - Abnormal; Notable for the following components:       Result Value    Chloride 96 (*)     CO2 34.0 (*)     BUN 32 (*)     Creatinine 8.17 (*)     eGFR-Cr 6 (*)     All other components within normal limits   CBC W/ DIFFERENTIAL - Abnormal; Notable for the following components:    RBC 3.58 (*)     HGB 10.9 (*)     HCT 30.6 (*)     .0 (*)     All other components within normal limits   TROPONIN I HIGH SENSITIVITY - Normal   CBC WITH DIFFERENTIAL WITH PLATELET    Narrative:     The following orders were created for panel order CBC With Differential With Platelet.  Procedure                               Abnormality         Status                     ---------                               -----------         ------                     CBC W/ DIFFERENTIAL[[id number]]          Abnormal            Final result                 Please view results for these tests on the individual orders.     EKG    Rate, intervals and axes as noted on EKG Report.  Rate: 79  Rhythm: Sinus Rhythm  Reading: Normal sinus rhythm.  No ST elevation.  Inverted  T waves laterally.                          MDM        Differential diagnosis before testing includes but not limited to skull fracture, intracranial hemorrhage, shoulder fracture, electrolyte abnormality, dysrhythmia, which is a medical condition that poses a threat to life/function    Past Medical History/comorbidities-stage renal disease on dialysis      Radiographic images  I personally reviewed the radiographs and my individual interpretation shows no pneumothorax on chest x-ray  I also reviewed the official reports that showed CT brain no acute intracranial process, CT cervical spine no acute traumatic injury, right shoulder x-ray no fracture or dislocation.  Chest x-ray    History obtained by external source  (EMS, family, law enforcement, )other sources of medical information included history per EMS as in HPI    Discussion of management (consult/physicians, social work, pharmacy,ect) Dr. Taylor, nephrology, Dr. Gracia de santiago hospitalist      Course of Events during Emergency Room Visit include established patient placed on cardiac monitor and pulse ox.  CBC white count 5.1 hemoglobin 10.9 platelet 102.  Chemistry sodium 136 potassium 4.1 BUN 32 creatinine 8.17 glucose 89.  Troponin was 27.  CT brain and cervical spine unremarkable.  Chest x-ray and shoulder x-ray without acute findings.  Patient does have tenderness of the right AC joint concerning for mild AC joint sprain.  Patient presents after fall versus possible syncopal episode, discussed with hospitalist and nephrologist.  Patient will be admitted for further evaluation and treatment.  Patient and wife agree with plan    Shared decision making was utilized           Disposition:    Admission  I have discussed with the patient the results of test, differential diagnosis, and treatment plan. They expressed clear understanding of these instructions and agrees to the plan provided.       Note to patient: The 21st Century Cures Act makes medical notes  like these available to patients in the interest of transparency. However, this is a medical document intended as peer to peer communication. It is written in medical language and may contain abbreviations or verbiage that are unfamiliar. It may appear blunt or direct. Medical documents are intended to carry relevant information, facts as evident, and the clinical opinion of the practitioner.           Admission disposition: 2/9/2024  1:44 PM                                        Medical Decision Making      Disposition and Plan     Clinical Impression:  1. Syncope and collapse    2. ESRD (end stage renal disease) on dialysis ([facility])         Disposition:  Admit  2/9/2024  1:44 pm    Follow-up:  No follow-up provider specified.        Medications Prescribed:  Current Discharge Medication List                            Hospital Problems       Present on Admission  Date Reviewed: 1/30/2024            ICD-10-CM Noted POA    * (Principal) Syncope and collapse R55 2/9/2024 Unknown    ESRD (end stage renal disease) on dialysis ([facility]) N18.6, Z99.2 2/9/2024 Unknown

## 2024-02-09 NOTE — CONSULTS
[facility]  Report of Consultation    Nader Campbell Patient Status:  Emergency    8/10/1945 MR [id number]   Location [facility] EMERGENCY DEPARTMENT Attending Casper Lee,    Hosp Day # 0 PCP Anoop Keys MD     Reason for Consultation:  ESRD    History of Present Illness:  Nader Campbell is a a(n) 78 year old male with hx of ESRD on HD (planning to transition to PD), dementia, labile BP/HTN, who presents to ER w/p fall/?syncope. Patient had HD yesterday. He was going to get his PD catheter evaluated at the [facility] HD unit today when while walking from his car to the facility he fell. He was being helped by his wife. Wife states the patient pulled her back and fell backwards taking her with him. She reports he looked like he was about to pass out. She herself broke her arm  Currently patient is sitting in bed comforbably  No cp/sob  Normal vitals  Labs reviewed and OK    Wife report pt has been pulling at his PD catheter and the exit site appears irritated    History:  Past Medical History:   Diagnosis Date    Back problem     BPH (benign prostatic hyperplasia)     Cataract     CKD (chronic kidney disease)     Congestive heart disease (HCC)     before transplant in     Dialysis patient ([facility]) 2023    HEMODIALYSIS TUES/THURS/SAT 3.5HRS    Esophageal reflux     Fistula     RIGHT ARM    Glaucoma     Gout     Hearing impairment     HA's x2, doesnt wear    Heart transplanted ([facility]) 2006-@ [facility]    High blood pressure     High cholesterol     Hypercholesterolemia     Muscle weakness     uses a cane- balance issues    Obstructive sleep apnea syndrome 2009    Problems with swallowing     Renal disorder     CKD    Short-term memory loss     Shortness of breath     Sleep apnea     had UPPP did not need device after    Visual impairment     glasses     Past Surgical History:   Procedure Laterality Date    ARTHROSCOPY OF JOINT UNLISTED Right     rotator cuff repair     AV FISTULA REVISION, OPEN      incorrect info per wife    COLONOSCOPY      HEART TRANSPLANT  2006    [facility]     KNEE REPLACEMENT SURGERY Left 2018    x2    OTHER SURGICAL HISTORY  08/05/2021    [facility]    ROTATOR CUFF REPAIR      SINUS SURGERY        TONSILLECTOMY      TOTAL KNEE REPLACEMENT Left     2 on this leg    UPPER GI ENDOSCOPY,EXAM       Family History   Problem Relation Age of Onset    Heart Disorder Father         heart attack    Heart Disorder Mother         CHF      reports that he quit smoking about 14 years ago. His smoking use included cigarettes. He has never used smokeless tobacco. He reports current alcohol use. He reports that he does not currently use drugs.    Allergies:  Allergies   Allergen Reactions    Amlodipine OTHER (SEE COMMENTS) and SWELLING     Leg swelling    Dalteparin HIVES and RASH     Fragmin      Lisinopril OTHER (SEE COMMENTS)     Hyperkalemia     Penicillins HIVES, RASH, SWELLING and SHORTNESS OF BREATH    Oxycodone OTHER (SEE COMMENTS)     Lost his taste and wt loss       Current Medications:  No current facility-administered medications for this encounter.  Home Medications:  Prior to Admission Medications   Medication Sig    risperiDONE 0.5 MG Oral Tab Take 1.5 tablets (0.75 mg total) by mouth nightly. May also take 0.5 tablet (0.25 mg total) daily as needed (agitation).    risperiDONE 0.25 MG Oral Tab TAKE 2 TABLETS BY MOUTH NIGHTLY. PLUS ONE ADDITIONAL TABLET AS NEEDED.    senna-docusate 8.6-50 MG Oral Tab Take 1 tablet by mouth daily.    HYDROcodone-acetaminophen 5-325 MG Oral Tab Take 1-2 tablets by mouth every 6 (six) hours as needed for Pain.    memantine 10 MG Oral Tab Take 1 tablet (10 mg total) by mouth daily.    tacrolimus 1 MG Oral Cap Take 1 capsule (1 mg total) by mouth 2 (two) times daily. FOR HEART TRANSPLANT  PATIENT TAKES TACROLIMUS 1.5 MG IN THE AM AND 1 MG IN THE EVENING    sevelamer carbonate 800 MG Oral Tab Take 1 tablet (800 mg total) by  mouth 3 (three) times daily with meals.    gabapentin 100 MG Oral Cap Take 1 capsule (100 mg total) by mouth 3 (three) times a week.    Nephro-Jaime 0.8 MG Oral Tab Take 1 tablet (0.8 mg total) by mouth daily.    tacrolimus 0.5 MG Oral Cap Take 1 capsule (0.5 mg total) by mouth daily. FOR HEART TRANSPLANT    donepezil 10 MG Oral Tab Take 1 tablet (10 mg total) by mouth nightly.    hydrOXYzine 25 MG Oral Tab Take 1 tablet (25 mg total) by mouth every 8 (eight) hours as needed for Itching.    Pravastatin Sodium 20 MG Oral Tab Take 1 tablet (20 mg total) by mouth nightly.    allopurinol 100 MG Oral Tab Take 0.5 tablets (50 mg total) by mouth daily.       Review of Systems:  See HPI; A total of 12 systems reviewed and otherwise unremarkable.    Physical Exam:  Vital signs: Blood pressure 111/80, pulse 83, temperature 97.9 °F (36.6 °C), resp. rate 13, height 6' (1.829 m), weight 180 lb (81.6 kg), SpO2 97%.  General: No acute distress. Alert and oriented   HEENT: Moist mucous membranes. EOM-I. PERRL  Neck: No lymphadenopathy.  No JVD. No carotid bruits.  Respiratory: Clear to auscultation bilaterally.  No wheezes. No rhonchi.  Cardiovascular: S1, S2.  Regular rate and rhythm.  No murmurs. Equal pulses   Abdomen: Soft, nontender, nondistended.  Positive bowel sounds.   PD cath exit site- w/ noted irritation/redness; no bleeding/discharge noted. Cuff appears pulled out  Neurologic: No focal neurological deficits.   Musculoskeletal: Full range of motion of all extremities.  No swelling noted.   Integument: No lesions. No erythema.  Psychiatric: Appropriate mood and affect.    Laboratory:  Lab Results   Component Value Date    WBC 5.1 02/09/2024    HGB 10.9 02/09/2024    HCT 30.6 02/09/2024    .0 02/09/2024    CREATSERUM 8.17 02/09/2024    BUN 32 02/09/2024     02/09/2024    K 4.1 02/09/2024    CL 96 02/09/2024    CO2 34.0 02/09/2024    GLU 89 02/09/2024    CA 8.9 02/09/2024          BUN (mg/dL)   Date Value    02/09/2024 32 (H)   02/02/2024 36 (H)   02/01/2024 58 (H)     Blood Urea Nitrogen (mg/dL)   Date Value   05/25/2021 40.0 (H)   10/19/2020 47.0 (H)   09/30/2020 68.0 (H)     Creatinine (mg/dL)   Date Value   02/09/2024 8.17 (H)   02/02/2024 8.86 (H)   02/01/2024 12.10 (H)   05/25/2021 2.03 (H)   10/19/2020 2.81 (H)   09/30/2020 3.10 (H)         Imaging:  Reviewed     Impression:  ESRD; due to CNI toxicity-  on HD; he has a functional AVF. Last HD yesterday. Typically TTS. Patient has sig dementia and has had trouble sitting still at the HD unit- wife has to accompany him and this is difficult for her. They are in the process of transitioning to PD.   Labs/volume OK  Syncope vs. Mechanical fall. Unclear. BP stable. His volume looks stable.  - monitor; w/u per primary  PD catheter dysfunction - wife reports patient has been pulling at it; exam as noted  - will request surgery eval  Hx of heart Tx  Anemia - continue ERVIN w/ HD  Dementia      Thank you for allowing me to participate in this patient's care.  Please feel free to call me with any questions or concerns.    Antonio Taylor MD  2/9/2024  2:46 PM

## 2024-02-09 NOTE — ED QUICK NOTES
Rounding Completed. Assumed care of patient at this time. Received report from Bhaskar SEN. Patient is alert and resting on the cot. Vitals stable.     Plan of Care reviewed. Waiting for room assignment.  Elimination needs assessed.  Provided family at bedside and juice and crackers.    Bed is locked and in lowest position. Call light within reach.

## 2024-02-09 NOTE — ED QUICK NOTES
Orders for admission, patient is aware of plan and ready to go upstairs. Any questions, please call ED RN Bhaskar at extension [phone].     Patient Covid vaccination status: Fully vaccinated     COVID Test Ordered in ED: None    COVID Suspicion at Admission: N/A    Running Infusions:  None    Mental Status/LOC at time of transport: A/O X2 per normal    Other pertinent information:   CIWA score: N/A   NIH score:  N/A

## 2024-02-09 NOTE — ED INITIAL ASSESSMENT (HPI)
Unwitnessed fall while walking in to dialysis today. Unknown if pt struck his head. Pt is A/OX2 per his norm. No known blood thinners.

## 2024-02-10 VITALS
TEMPERATURE: 98 F | RESPIRATION RATE: 18 BRPM | DIASTOLIC BLOOD PRESSURE: 79 MMHG | OXYGEN SATURATION: 99 % | WEIGHT: 186.5 LBS | HEART RATE: 81 BPM | BODY MASS INDEX: 25.26 KG/M2 | SYSTOLIC BLOOD PRESSURE: 128 MMHG | HEIGHT: 72 IN

## 2024-02-10 PROBLEM — D63.1 ANEMIA DUE TO CHRONIC KIDNEY DISEASE, ON CHRONIC DIALYSIS (HCC): Status: ACTIVE | Noted: 2024-01-31

## 2024-02-10 PROBLEM — Z99.2 ANEMIA DUE TO CHRONIC KIDNEY DISEASE, ON CHRONIC DIALYSIS (HCC): Status: ACTIVE | Noted: 2024-01-31

## 2024-02-10 PROBLEM — I95.1 ORTHOSTATIC HYPOTENSION: Status: ACTIVE | Noted: 2024-02-10

## 2024-02-10 PROBLEM — N18.6 ANEMIA DUE TO CHRONIC KIDNEY DISEASE, ON CHRONIC DIALYSIS (HCC): Status: ACTIVE | Noted: 2024-01-31

## 2024-02-10 LAB
ALBUMIN SERPL-MCNC: 3.2 G/DL (ref 3.4–5)
ALBUMIN/GLOB SERPL: 0.8 {RATIO} (ref 1–2)
ALP LIVER SERPL-CCNC: 185 U/L
ALT SERPL-CCNC: 9 U/L
ANION GAP SERPL CALC-SCNC: 9 MMOL/L (ref 0–18)
AST SERPL-CCNC: 18 U/L (ref 15–37)
BASOPHILS # BLD AUTO: 0.02 X10(3) UL (ref 0–0.2)
BASOPHILS NFR BLD AUTO: 0.4 %
BILIRUB SERPL-MCNC: 0.7 MG/DL (ref 0.1–2)
BUN BLD-MCNC: 44 MG/DL (ref 9–23)
CALCIUM BLD-MCNC: 8.2 MG/DL (ref 8.5–10.1)
CHLORIDE SERPL-SCNC: 97 MMOL/L (ref 98–112)
CO2 SERPL-SCNC: 31 MMOL/L (ref 21–32)
CREAT BLD-MCNC: 10.1 MG/DL
EGFRCR SERPLBLD CKD-EPI 2021: 5 ML/MIN/1.73M2 (ref 60–?)
EOSINOPHIL # BLD AUTO: 0.28 X10(3) UL (ref 0–0.7)
EOSINOPHIL NFR BLD AUTO: 5.8 %
ERYTHROCYTE [DISTWIDTH] IN BLOOD BY AUTOMATED COUNT: 14.6 %
GLOBULIN PLAS-MCNC: 3.8 G/DL (ref 2.8–4.4)
GLUCOSE BLD-MCNC: 96 MG/DL (ref 70–99)
HBV SURFACE AG SER-ACNC: <0.1 [IU]/L
HBV SURFACE AG SERPL QL IA: NONREACTIVE
HCT VFR BLD AUTO: 28.9 %
HGB BLD-MCNC: 10.1 G/DL
IMM GRANULOCYTES # BLD AUTO: 0.01 X10(3) UL (ref 0–1)
IMM GRANULOCYTES NFR BLD: 0.2 %
LYMPHOCYTES # BLD AUTO: 1.09 X10(3) UL (ref 1–4)
LYMPHOCYTES NFR BLD AUTO: 22.4 %
MAGNESIUM SERPL-MCNC: 2.9 MG/DL (ref 1.6–2.6)
MCH RBC QN AUTO: 30.3 PG (ref 26–34)
MCHC RBC AUTO-ENTMCNC: 34.9 G/DL (ref 31–37)
MCV RBC AUTO: 86.8 FL
MONOCYTES # BLD AUTO: 0.43 X10(3) UL (ref 0.1–1)
MONOCYTES NFR BLD AUTO: 8.8 %
NEUTROPHILS # BLD AUTO: 3.03 X10 (3) UL (ref 1.5–7.7)
NEUTROPHILS # BLD AUTO: 3.03 X10(3) UL (ref 1.5–7.7)
NEUTROPHILS NFR BLD AUTO: 62.4 %
OSMOLALITY SERPL CALC.SUM OF ELEC: 295 MOSM/KG (ref 275–295)
PHOSPHATE SERPL-MCNC: 5.5 MG/DL (ref 2.5–4.9)
PLATELET # BLD AUTO: 95 10(3)UL (ref 150–450)
POTASSIUM SERPL-SCNC: 4.7 MMOL/L (ref 3.5–5.1)
PROT SERPL-MCNC: 7 G/DL (ref 6.4–8.2)
RBC # BLD AUTO: 3.33 X10(6)UL
SODIUM SERPL-SCNC: 137 MMOL/L (ref 136–145)
WBC # BLD AUTO: 4.9 X10(3) UL (ref 4–11)

## 2024-02-10 PROCEDURE — 99233 SBSQ HOSP IP/OBS HIGH 50: CPT | Performed by: INTERNAL MEDICINE

## 2024-02-10 PROCEDURE — 5A1D70Z PERFORMANCE OF URINARY FILTRATION, INTERMITTENT, LESS THAN 6 HOURS PER DAY: ICD-10-PCS | Performed by: INTERNAL MEDICINE

## 2024-02-10 PROCEDURE — 99232 SBSQ HOSP IP/OBS MODERATE 35: CPT | Performed by: SURGERY

## 2024-02-10 RX ORDER — LIDOCAINE HYDROCHLORIDE AND EPINEPHRINE 10; 10 MG/ML; UG/ML
10 INJECTION, SOLUTION INFILTRATION; PERINEURAL ONCE
Qty: 20 ML | Refills: 0 | Status: DISCONTINUED | OUTPATIENT
Start: 2024-02-10 | End: 2024-02-10

## 2024-02-10 RX ORDER — HYDROCODONE BITARTRATE AND ACETAMINOPHEN 5; 325 MG/1; MG/1
1 TABLET ORAL EVERY 4 HOURS PRN
Qty: 5 TABLET | Refills: 0 | Status: SHIPPED | OUTPATIENT
Start: 2024-02-10 | End: 2024-02-19

## 2024-02-10 NOTE — PROGRESS NOTES
[facility] General Surgery  Progress Note      Patient Name:  Nader Campbell   YOB: 1945   Gender:  Male   Appt Date:  2/9/2024   Provider:  No name on file     PATIENT PROVIDERS  Referring Provider: No ref. provider found   Address: No referring provider defined for this encounter.   Phone #: N/A    Primary Care Provider:Anoop Keys MD   Address: [unfilled]   Phone #: [phone]       CHIEF COMPLAINT  Chief Complaint   Patient presents with    Fall     Unwitnessed fall while walking in to dialysis today. Unknown if pt struck his head. Pt is A/OX2 per his norm. No known blood thinners.    Trauma 1 & 2        PROBLEMS  Reviewed   Patient Active Problem List   Diagnosis    Glaucoma    CKD (chronic kidney disease) stage 4, GFR 15-29 ml/min (HCC)    Right hip pain    Thrombocytopenia (HCC)    Chest pain, rule out acute myocardial infarction    Azotemia    Dementia (HCC)    Immunosuppressant-induced pancytopenia  (HCC)    Benign hypertensive heart disease with heart failure (HCC)    Complication of heart transplant (HCC)    Pruritic disorder    Sensorineural hearing loss    Obstructive sleep apnea syndrome    Mitral valve disorder    Mild cognitive impairment with memory loss    Impotence of organic origin    Long-term use of immunosuppressant medication    Immunosuppressed status (HCC)    Hyperlipidemia    History of heart transplant (HCC)    History of esophageal dilatation    Hearing decreased    Hallux valgus, acquired    GERD (gastroesophageal reflux disease)    Coronary atherosclerosis    CHF (congestive heart failure) (HCC)    Chronic gouty arthropathy    Personal history of colonic polyps    THADDEUS (acute kidney injury) (HCC)    Dementia with behavioral disturbance (HCC)    Depression    Calcineurin inhibitor causing toxicity in therapeutic use    Chronic cholecystitis    Heart transplant status (HCC)    Acute renal failure superimposed on chronic kidney disease, unspecified CKD stage,  unspecified acute renal failure type    Muscle cramping    Dyspnea on exertion    Metabolic acidosis    Vertebral artery stenosis, unspecified laterality    Unsteady gait    Diplopia    ESRD on hemodialysis (HCC)    ESRD (end stage renal disease) ([facility])    Encephalopathy acute    Metabolic encephalopathy    Altered mental status    Primary hypertension    Anemia in ESRD (end-stage renal disease)  ([facility])    Hyperkalemia    Weakness    Anemia due to chronic kidney disease, on chronic dialysis (HCC)    ESRD on dialysis ([facility])    Syncope and collapse    ESRD (end stage renal disease) on dialysis ([facility])    PD catheter dysfunction ([facility])    Labile hypertension    Orthostatic hypotension        History of Present Illness:  Patient presented to the emergency room following unwitnessed fall dialysis.  Noted.  No acute traumatic injuries.  Peritoneal dialysis catheter partially withdrawn.  Request for PD catheter evaluation.     Vital Signs:  /69 (BP Location: Left arm)   Pulse 79   Temp 97.5 °F (36.4 °C) (Oral)   Resp 18   Ht 72\"   Wt 186 lb 8.2 oz (84.6 kg)   SpO2 94%   BMI 25.30 kg/m²      Medications Reviewed:    Current Outpatient Medications:     HYDROcodone-acetaminophen 5-325 MG Oral Tab, Take 1 tablet by mouth every 4 (four) hours as needed., Disp: 5 tablet, Rfl: 0    lidocaine-menthol 4-1 % External Patch, Place 2 patches onto the skin daily., Disp: 30 patch, Rfl: 0     Allergies Reviewed:  Allergies   Allergen Reactions    Amlodipine OTHER (SEE COMMENTS) and SWELLING     Leg swelling    Dalteparin HIVES and RASH     Fragmin      Lisinopril OTHER (SEE COMMENTS)     Hyperkalemia     Penicillins HIVES, RASH, SWELLING and SHORTNESS OF BREATH    Oxycodone OTHER (SEE COMMENTS)     Lost his taste and wt loss        History:  Reviewed:  Past Medical History:   Diagnosis Date    Back problem     BPH (benign prostatic hyperplasia)     Cataract     CKD (chronic kidney disease)     Congestive heart disease (HCC)      before transplant in     Dialysis patient ([facility]) 2023    HEMODIALYSIS TUES/THURS/SAT 3.5HRS    Esophageal reflux     Fistula     RIGHT ARM    Glaucoma     Gout     Hearing impairment     HA's x2, doesnt wear    Heart transplanted ([facility]) 2006-@ [facility]    High blood pressure     High cholesterol     Hypercholesterolemia     Muscle weakness     uses a cane- balance issues    Obstructive sleep apnea syndrome 2009    Problems with swallowing     Renal disorder     CKD    Short-term memory loss     Shortness of breath     Sleep apnea     had UPPP did not need device after    Visual impairment     glasses      Reviewed:  Past Surgical History:   Procedure Laterality Date    Arthroscopy of joint unlisted Right     rotator cuff repair    Av fistula revision, open      incorrect info per wife    Colonoscopy      Heart transplant      [facility]     Knee replacement surgery Left 2018    x2    Other surgical history  2021    Cysto Dr. Delgado    Rotator cuff repair      Sinus surgery        Tonsillectomy      Total knee replacement Left     2 on this leg    Upper gi endoscopy,exam        Reviewed Social History:  Social History     Socioeconomic History    Marital status:    Tobacco Use    Smoking status: Former     Types: Cigarettes     Quit date:      Years since quittin.1    Smokeless tobacco: Never   Vaping Use    Vaping Use: Never used   Substance and Sexual Activity    Alcohol use: Yes     Comment: 1 beer per day    Drug use: Not Currently    Sexual activity: Yes   Other Topics Concern    Caffeine Concern Yes     Comment: rarely    Exercise No     Social Determinants of Health     Food Insecurity: No Food Insecurity (2024)    Food Insecurity     Food Insecurity: Never true   Transportation Needs: No Transportation Needs (2024)    Transportation Needs     Lack of Transportation: No   Housing Stability: Low Risk  (2024)    Housing Stability     Housing  Instability: No      Reviewed:  Family History   Problem Relation Age of Onset    Heart Disorder Father         heart attack    Heart Disorder Mother         CHF        Review of Systems:  GENERAL HEALTH: feels well, no fatigue.   SKIN: no change in mole.   HEENT: denies pain  RESPIRATORY: denies shortness of breath, wheezing or cough   CARDIOVASCULAR: denies chest pain, SOB, edema,orthopnea, no palpitations   GI: denies nausea, vomiting, constipation, diarrhea; no rectal bleeding  GENITAL/: no blood in urine  MUSCULOSKELETAL: no joint complaints, no back pain  NEURO: no tingling, numbness, weakness  ENDOCRINE: denies weight loss/gain  PSYCH: no mood changes       Physical Examination:  Constitutional: Confused with stigmata of dementia today patient not cooperative with examination.  Declining any intervention of his PD catheter.   Eyes: Sclera: non-icteric.   Neck: Neck: supple.   Lymph Nodes: No Palpable lymphadenopathy  Lungs: Breath sounds normal.   Cardiovascular: Regular rhythm and rate   Abdomen: Soft, nontender nondistended without rebound guarding or rigidity.  PD catheter partially withdrawn with external cuff outside of skin.  No evidence of peritonitis.  Musculoskeletal: Well perfused no edema. Pulses palpable.  Skin: No lesions or rashes; no jaundice.        Assessment / Plan:  @      Partial withdrawal of PD cath    No acute surgical intervention required  Catheter now malpositioned and should not be used for PD dialysis.  Bedside removal may be possible, patient will likely be intolerant and will require sedation for removal.  At multiple episodes of manipulating and now partially withdrawing his PD catheter.  Perhaps, unfortunately, due to his early dementia patient may not be an ideal for reinsertion of PD catheter as the patient will likely have episodes in the future of withdrawing his catheter which may lead to more significant risk of injury, infection or other sequela.  Will discuss with  patient and wife as an outpatient and arrange timing for catheter withdrawal under anesthesia in the near future.  Patient can be discharged from a general surgery standpoint when deemed appropriate by primary care team and nephrology.      Julio Cesar Reyes MD FACS  Trauma Medical Director, [facility] General Surgery    The 21st Century Cures Act makes medical notes like these available to patients in the interest of transparency. Please be advised this is a medical document. Medical documents are intended to carry relevant information, facts as evident, and the clinical opinion of the practitioner. The medical note is intended as peer to peer communication and may appear blunt or direct. It is written in medical language and may contain abbreviations or verbiage that are unfamiliar.    This note was prepared using Dragon Medical voice recognition dictation software. As a result, errors may occur. When identified, these errors have been corrected. While every attempt is made to correct errors during dictation, discrepancies may still exist.

## 2024-02-10 NOTE — PLAN OF CARE
Patient alert and oriented to self, knows he is in hospital but not the name, when asked year \"I don't know, I am retired and do not keep up with that\", cannot name President, short term memory loss and impulsive (did take out PIV; lung sounds clear, on room air, does have a cough that is productive at times- sputum clear/white, thin; no edema; AVF to right upper arm with bruit and thrill present; PD dialysis catheter to left abdomen- gauze and tape present, did cover with ABD and papertape as patient was touching frequently; continent of bowel with LBM today, per patient's wife, patient  urinates rarely; patient up with an assist x1 and walker; per General Surgery patient can be discharge; per Dr Shepherd, patient can be discharged after dialysis; Marco A called this morning with an ETA of 1300, called several times but call was being dropped, finally spoke to someone at 1500 and asked for a call back with an ETA- messaged Dr Fagan to inform awaiting ETA for when HD will be done today.     1600: HD just arrived    1745: Per HD RN, patient will be done about 8:30-8:45pm- sent [employer] message to Dr Fagan to inform.     1945: Reviewed discharge instructions with patient's wife, including outpatient appointments made and to be made, [facility] at discharge; reviewed discharge medications, prescriptions given (per patient's wife, she does have norco at home) and patient to get evening medications before discharge; RN Aubrey to complete discharge after patient completes dialysis.     Problem: Patient/Family Goals  Goal: Patient/Family Long Term Goal  Description: Patient's Long Term Goal: stay out of the hospital    Interventions:  - take meds as prescribed  - attend follow up appointments  - don't miss dialysis  - See additional Care Plan goals for specific interventions  Outcome: Progressing  Goal: Patient/Family Short Term Goal  Description: Patient's Short Term Goal: go home    Interventions:   - meds, labs, tele  monitoring.  - consults to see  - See additional Care Plan goals for specific interventions  Outcome: Progressing     Problem: SAFETY ADULT - FALL  Goal: Free from fall injury  Description: INTERVENTIONS:  - Assess pt frequently for physical needs  - Identify cognitive and physical deficits and behaviors that affect risk of falls.  - [place] fall precautions as indicated by assessment.  - Educate pt/family on patient safety including physical limitations  - Instruct pt to call for assistance with activity based on assessment  - Modify environment to reduce risk of injury  - Provide assistive devices as appropriate  - Consider OT/PT consult to assist with strengthening/mobility  - Encourage toileting schedule  Outcome: Progressing     Problem: PAIN - ADULT  Goal: Verbalizes/displays adequate comfort level or patient's stated pain goal  Description: INTERVENTIONS:  - Encourage pt to monitor pain and request assistance  - Assess pain using appropriate pain scale  - Administer analgesics based on type and severity of pain and evaluate response  - Implement non-pharmacological measures as appropriate and evaluate response  - Consider cultural and social influences on pain and pain management  - Manage/alleviate anxiety  - Utilize distraction and/or relaxation techniques  - Monitor for opioid side effects  - Notify MD/LIP if interventions unsuccessful or patient reports new pain  - Anticipate increased pain with activity and pre-medicate as appropriate  Outcome: Progressing

## 2024-02-10 NOTE — PLAN OF CARE
Patient admitted from ED into 2602 just before 5pm, accompanied by wife. Oriented to room, call light and safety. Oriented to self only, with history of dementia. NSR on tele monitor. O2 sat adequate on RA. Denies chest pain and shortness of breath. Endorses some right shoulder pain. Skin check completed by this RN and PCT Chanel. Plan of care updated. Bed locked, in lowest position, with alarm on. Call light and personal items in reach. All needs met.    Problem: Patient/Family Goals  Goal: Patient/Family Long Term Goal  Description: Patient's Long Term Goal: stay out of the hospital    Interventions:  - take meds as prescribed  - attend follow up appointments  - don't miss dialysis  - See additional Care Plan goals for specific interventions  Outcome: Progressing  Goal: Patient/Family Short Term Goal  Description: Patient's Short Term Goal: go home    Interventions:   - meds, labs, tele monitoring.  - consults to see  - See additional Care Plan goals for specific interventions  Outcome: Progressing     Problem: SAFETY ADULT - FALL  Goal: Free from fall injury  Description: INTERVENTIONS:  - Assess pt frequently for physical needs  - Identify cognitive and physical deficits and behaviors that affect risk of falls.  - [place] fall precautions as indicated by assessment.  - Educate pt/family on patient safety including physical limitations  - Instruct pt to call for assistance with activity based on assessment  - Modify environment to reduce risk of injury  - Provide assistive devices as appropriate  - Consider OT/PT consult to assist with strengthening/mobility  - Encourage toileting schedule  Outcome: Progressing     Problem: PAIN - ADULT  Goal: Verbalizes/displays adequate comfort level or patient's stated pain goal  Description: INTERVENTIONS:  - Encourage pt to monitor pain and request assistance  - Assess pain using appropriate pain scale  - Administer analgesics based on type and severity of pain and  evaluate response  - Implement non-pharmacological measures as appropriate and evaluate response  - Consider cultural and social influences on pain and pain management  - Manage/alleviate anxiety  - Utilize distraction and/or relaxation techniques  - Monitor for opioid side effects  - Notify MD/LIP if interventions unsuccessful or patient reports new pain  - Anticipate increased pain with activity and pre-medicate as appropriate  Outcome: Progressing

## 2024-02-10 NOTE — PHYSICAL THERAPY NOTE
PHYSICAL THERAPY EVALUATION - INPATIENT     Room Number: 2602/2602-A  Evaluation Date: 2/10/2024  Type of Evaluation: Initial  Physician Order: PT Eval and Treat    Presenting Problem: syncope adn collapse  Co-Morbidities : ESRD on HD (T, th, Sat), s/p heart transplant 2006, BPH, HTN, HLD, AMANDO, dementia, gout  Reason for Therapy: Mobility Dysfunction and Discharge Planning    PHYSICAL THERAPY ASSESSMENT   Patient is currently functioning below baseline with transfers and gait.  Prior to admission, patient's baseline is supervision without assistive device in the home with fall hx.  Patient is requiring contact guard assist as a result of the following impairments: impaired standing balance, cognitive deficits (dementia), and decrease safety awareness .  Physical Therapy will continue to follow for duration of hospitalization.    Patient will benefit from continued skilled PT Services at discharge to promote functional independence and safety with additional support and return home with home health PT.    PLAN  PT Treatment Plan: Energy conservation;Patient education;Family education;Gait training;Balance training;Transfer training  Rehab Potential : Good  Frequency (Obs): 3-5x/week  Number of Visits to Meet Established Goals: 3      CURRENT GOALS    Goal #1 Patient is able to demonstrate supine - sit EOB @ level: modified independent     Goal #2 Patient is able to demonstrate transfers Sit to/from Stand at assistance level: supervision     Goal #3 Patient is able to ambulate 150 feet with assist device:  least resistive assistive device  at assistance level: supervision     Goal #4    Goal #5    Goal #6    Goal Comments: Goals established on 2/10/2024      PHYSICAL THERAPY MEDICAL/SOCIAL HISTORY  History related to current admission: Patient is a 78 year old male admitted on 2/9/2024 from home for s/p fall.  Pt diagnosed with syncope and collapse.      HOME SITUATION  Type of Home: House   Home Layout: One  level  Stairs to Enter : 0             Lives With: Spouse     Patient Owned Equipment: Cane;Rolling walker       Prior Level of [place]: per pt wife reports via telephone, pt amb without assistive device, most times in the home with reminders to use cane. Pt has had 4-5 falls in past 3 months.pt lives with spouse, who recent has fx'd wrist due to pt fall. Pt spouse states she has family that can assist her with her needs as well as pt needs once pt is discharged from [facility].      SUBJECTIVE  \"I don't need this.\"  Pt response to physical therapy.      OBJECTIVE  Precautions: Bed/chair alarm;Limb alert - right  Fall Risk: High fall risk    WEIGHT BEARING RESTRICTION  Weight Bearing Restriction: None                PAIN ASSESSMENT  Ratin  Location: denies pain at this time       COGNITION  Overall Cognitive Status:  Impaired  Following Commands:  follows one step commands with increased time and follows one step commands with repetition  Safety Judgement:  decreased awareness of need for assistance and decreased awareness of need for safety  Awareness of Deficits:  decreased awareness of deficits    RANGE OF MOTION AND STRENGTH ASSESSMENT  Upper extremity ROM and strength: see OT eval    Lower extremity ROM is within functional limits     Lower extremity strength is within functional limits       BALANCE  Static Sitting: Good  Dynamic Sitting: Fair +  Static Standing: Fair -  Dynamic Standing: Fair -    ADDITIONAL TESTS                                    ACTIVITY TOLERANCE                         O2 WALK       NEUROLOGICAL FINDINGS                        AM-PAC '6-Clicks' INPATIENT SHORT FORM - BASIC MOBILITY  How much difficulty does the patient currently have...  Patient Difficulty: Turning over in bed (including adjusting bedclothes, sheets and blankets)?: None   Patient Difficulty: Sitting down on and standing up from a chair with arms (e.g., wheelchair, bedside commode, etc.): A Little   Patient Difficulty:  Moving from lying on back to sitting on the side of the bed?: A Little   How much help from another person does the patient currently need...   Help from Another: Moving to and from a bed to a chair (including a wheelchair)?: A Little   Help from Another: Need to walk in hospital room?: A Little   Help from Another: Climbing 3-5 steps with a railing?: A Little       AM-PAC Score:  Raw Score: 19   Approx Degree of Impairment: 41.77%   Standardized Score (AM-PAC Scale): 45.44   CMS Modifier (G-Code): CK    FUNCTIONAL ABILITY STATUS  Gait Assessment   Functional Mobility/Gait Assessment  Gait Assistance: Contact guard assist  Distance (ft): 75  Assistive Device: Rolling walker  Pattern: Shuffle (flexed posture)    Skilled Therapy Provided     Bed Mobility:  Rolling: not tested  Supine to sit: supervision   Sit to supine: supervision     Transfer Mobility:  Sit to stand: CGA from EOB   Stand to sit: CGA  Gait = pt amb x 75 feet with RW CGA due to pt impulsivity and decrease balance.  Pt required verbal cues to slow down.  Trial gait without assistive device x 15 feet CGA for safety.    Therapist's Comments: per RN pt ok to be seen. Pt received in bed and agreeable to therapy with verbal cues for encouragement. Pt vitals monitored and stable. Pt mobility as above.   Pt cooperative during most of session, with verbal cues and redirection to task due to dementia.  Pt returns to room and sitting at EOB, MD in to attend to pt. Pt was returned to supine with bed alarm set and MD and hospital staff in room.  RN updated and this writer spoke with pt's wife via telephone regarding pt PLOF and questions answered.      Exercise/Education Provided:  Bed mobility  Functional activity tolerated  Gait training  Posture  Transfer training    Patient End of Session: In bed;With  staff;RN aware of session/findings;All patient questions and concerns addressed;Alarm set      Patient Evaluation Complexity Level:  History Moderate - 1 or 2  personal factors and/or co-morbidities   Examination of body systems Low - addressing 1-2 elements   Clinical Presentation Low - Stable   Clinical Decision Making Low - Stable       PT Session Time: 30 minutes  Gait Trainin minutes

## 2024-02-10 NOTE — CM/SW NOTE
Pt current with [facility]. MIKHAIL order placed.     [facility]  P:[phone]  F:[phone]

## 2024-02-10 NOTE — PROGRESS NOTES
02/10/24 0356 02/10/24 0359 02/10/24 0401   Vital Signs   /67 104/66 102/65   MAP (mmHg) 70 80 75   BP Location Left arm Left arm Left arm   BP Method Automatic Automatic Automatic   Patient Position Lying Sitting Standing     Orthos (-)

## 2024-02-10 NOTE — PROGRESS NOTES
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 Labs / volume OK- HD today per usual routine    2) Partially dislodged PD cath (pt pulling on it)- surgery to evaluate. Abd exam benign; no evidence of peritonitis    3) Syncope vs mechanical fall- CT brain neg; no fracture    4) h/o heart transplant- on tacrolimus    5) Anemia- due to above; on EPO with HD    6) Dementia       Subjective:  Awake eating breakfast a mound of pancakes, wants honey     Physical Exam:   /65 (BP Location: Left arm)   Pulse 81   Temp 97.5 °F (36.4 °C) (Oral)   Resp 20   Ht 6' (1.829 m)   Wt 186 lb 8.2 oz (84.6 kg)   SpO2 93%   BMI 25.30 kg/m²   Temp (24hrs), Av.6 °F (36.4 °C), Min:97.4 °F (36.3 °C), Max:97.9 °F (36.6 °C)       Intake/Output Summary (Last 24 hours) at 2/10/2024 0717  Last data filed at 2024 2340  Gross per 24 hour   Intake 300 ml   Output 0 ml   Net 300 ml     Wt Readings from Last 3 Encounters:   02/10/24 186 lb 8.2 oz (84.6 kg)   24 189 lb (85.7 kg)   24 186 lb 12.8 oz (84.7 kg)     General: awake alert  HEENT: No scleral icterus, MMM  Neck: Supple, no ROXANN or thyromegaly  Cardiac: Regular rate and rhythm, S1, S2 normal, no murmur or tub  Lungs: Decreased BS at bases bilaterally   Abdomen: Soft, non-tender. + bowel sounds, no palpable organomegaly  Extremities: Without clubbing, cyanosis; no edema  Neurologic: Cranial nerves grossly intact, moving all extremities  Skin: Warm and dry, no rashes       Labs:   Lab Results   Component Value Date    WBC 4.9 02/10/2024    HGB 10.1 02/10/2024    HCT 28.9 02/10/2024    .0 2024    CREATSERUM 10.10 02/10/2024    BUN 44 02/10/2024     02/10/2024    K 4.7 02/10/2024    CL 97 02/10/2024    CO2 31.0 02/10/2024    GLU 96 02/10/2024    CA 8.2 02/10/2024    ALB 3.2 02/10/2024    ALKPHO 185 02/10/2024    BILT  0.7 02/10/2024    TP 7.0 02/10/2024    AST 18 02/10/2024    ALT 9 02/10/2024    MG 2.9 02/10/2024    PHOS 5.5 02/10/2024       Imaging:  All imaging studies reviewed.    Meds:   Current Facility-Administered Medications   Medication Dose Route Frequency    sodium chloride 0.9 % IV bolus 100 mL  100 mL Intravenous Q30 Min PRN    And    albumin human (Albumin) 25% injection 25 g  25 g Intravenous PRN Dialysis    lidocaine-prilocaine (Emla) 2.5-2.5 % cream   Topical PRN    epoetin jevon (Epogen, Procrit) 21652 UNIT/ML injection 10,000 Units  10,000 Units Intravenous Once in dialysis    acetaminophen (Tylenol) tab 650 mg  650 mg Oral Q4H PRN    Or    HYDROcodone-acetaminophen (Norco) 5-325 MG per tab 1 tablet  1 tablet Oral Q4H PRN    Or    HYDROcodone-acetaminophen (Norco) 5-325 MG per tab 2 tablet  2 tablet Oral Q4H PRN    acetaminophen (Tylenol Extra Strength) tab 500 mg  500 mg Oral Q4H PRN    polyethylene glycol (PEG 3350) (Miralax) 17 g oral packet 17 g  17 g Oral Daily PRN    sennosides (Senokot) tab 17.2 mg  17.2 mg Oral Nightly PRN    bisacodyl (Dulcolax) 10 MG rectal suppository 10 mg  10 mg Rectal Daily PRN    ondansetron (Zofran) 4 MG/2ML injection 4 mg  4 mg Intravenous Q6H PRN    allopurinol (Zyloprim) tab 50 mg  50 mg Oral Daily    memantine (Namenda) tab 20 mg  20 mg Oral Nightly    sevelamer carbonate (Renvela) tab 800 mg  800 mg Oral TID CC    tacrolimus (Prograf) cap 0.5 mg  0.5 mg Oral BID    tacrolimus (Prograf) cap 1 mg  1 mg Oral BID    hydrOXYzine (Atarax) tab 25 mg  25 mg Oral Q8H PRN    risperiDONE (RisperDAL) tab 0.75 mg  0.75 mg Oral Nightly         Questions/concerns were discussed with patient and/or family by bedside.          Samaria Shepherd MD  2/10/2024  7:17 AM

## 2024-02-10 NOTE — PROGRESS NOTES
[facility] Progress Note     Nader Campbell Patient Status:  Inpatient    8/10/1945 MRN [id number]   Formerly [facility] 2NE-A Attending Lula Fagan, *   Hosp Day # 1 PCP Anoop Keys MD     Chief Complaint: f/u syncope    Subjective:   S:  sitting up in bed, R shoulder hurts. No cp/sob/n/v/f/c/LH    Review of Systems:   10 point ROS completed and was negative, except for pertinent positive and negatives stated in subjective.    Objective:   Vital signs:  Temp:  [97.4 °F (36.3 °C)-97.9 °F (36.6 °C)] 97.5 °F (36.4 °C)  Pulse:  [77-84] 79  Resp:  [10-22] 18  BP: ()/(56-82) 102/69  SpO2:  [89 %-97 %] 94 %    Wt Readings from Last 6 Encounters:   02/10/24 186 lb 8.2 oz (84.6 kg)   24 189 lb (85.7 kg)   24 186 lb 12.8 oz (84.7 kg)   24 183 lb (83 kg)   24 185 lb (83.9 kg)   23 180 lb (81.6 kg)         Physical Exam:    Gen: No acute distress, alert and oriented , no accessory m use  Pulm: Lungs clear, ok respiratory effort  CV: Heart with regular rate and rhythm, no edema  Abd: Abdomen soft, nontender, nondistended, bowel sounds present, no peritoneal signs  MSK:   no clubbing, no cyanosis. Ttp R shoulder/trap m  Skin: no visible rashes    Psych:   appropriate affect,   fair memory    Results:   Diagnostic Data:      Labs:       Recent Labs   Lab 24  1047 02/10/24  0604   WBC 5.1 4.9   HGB 10.9* 10.1*   MCV 85.5 86.8   .0* 95.0*       Recent Labs   Lab 24  1047 02/10/24  0604    137   K 4.1 4.7   CL 96* 97*   CO2 34.0* 31.0   BUN 32* 44*   CREATSERUM 8.17* 10.10*   CA 8.9 8.2*   MG  --  2.9*   PHOS  --  5.5*   GLU 89 96       Recent Labs   Lab 02/10/24  0604   ALT 9*   AST 18   ALB 3.2*          COVID-19  Lab Results   Component Value Date    COVID19 Not Detected 2024    COVID19 Not Detected 2023    COVID19 Not Detected 2022       Medications:    lidocaine-EPINEPHrine  10 mL Subcutaneous Once     epoetin jevon  10,000 Units Intravenous Once in dialysis    allopurinol  50 mg Oral Daily    memantine  20 mg Oral Nightly    sevelamer carbonate  800 mg Oral TID CC    tacrolimus  0.5 mg Oral BID    tacrolimus  1 mg Oral BID    risperiDONE  0.75 mg Oral Nightly       sodium chloride **AND** albumin human, lidocaine-prilocaine, acetaminophen **OR** HYDROcodone-acetaminophen **OR** HYDROcodone-acetaminophen, acetaminophen, polyethylene glycol (PEG 3350), sennosides, bisacodyl, ondansetron, hydrOXYzine          ASSESSMENT / PLAN:      Nader Campbell is a 78 year old male from home with his wife with a PMHx significant for s/p heart transplant (2006), BPH, ESRD, (on dialysis T, Th, Sat), HTN, HLD, AMANDO, dementia and gout presents to the hospital s/p syncope and collapse.  See below for details.     Syncope    -CT brain  and CT cervical spine negative  -XR R shoulder without fx/dislocation, CXR ok  -pain control-norco prn, antiemetics, bowel regimen  -add lidocaine patches  -PT/OT  -orthostats neg     ESRD  -has AVF but he has trouble sitting for the dialysis sessions so they are trying to transition to PD at home  -PD line in place (placed on 1/30/24), cuff appears pulled out per renal, surgery to see, appreciate  -gabapentin on dialysis days     Stable chronic illnesses:  S/P Heart transplant   -tacrolimus     Dementia  -memantine  -risperidone     HLD  HTN  -statin  -not on anti-hypertensives     Gout  -allopurinol     PPx-scds for now    Daryl pt and RN Bren     Thank You,  Lula Fagan MD    [facility]  [facility]  Answering Service number: [phone]    Supplementary Documentation:

## 2024-02-10 NOTE — PLAN OF CARE
Patient alert and oriented to self, confused. On RA. NSR on tele. Continent of bowel and bladder. Pt complains of right shoulder pain 6/10, prn pain medication administered. No complaints of shortness of breath or chest pain/discomfort. POC : HD, Consults to see. Fall precautions in place. Call light within reach.    Problem: Patient/Family Goals  Goal: Patient/Family Long Term Goal  Description: Patient's Long Term Goal: stay out of the hospital    Interventions:  - take meds as prescribed  - attend follow up appointments  - don't miss dialysis  - See additional Care Plan goals for specific interventions  Outcome: Progressing  Goal: Patient/Family Short Term Goal  Description: Patient's Short Term Goal: go home    Interventions:   - meds, labs, tele monitoring.  - consults to see  - See additional Care Plan goals for specific interventions  Outcome: Progressing     Problem: SAFETY ADULT - FALL  Goal: Free from fall injury  Description: INTERVENTIONS:  - Assess pt frequently for physical needs  - Identify cognitive and physical deficits and behaviors that affect risk of falls.  - [place] fall precautions as indicated by assessment.  - Educate pt/family on patient safety including physical limitations  - Instruct pt to call for assistance with activity based on assessment  - Modify environment to reduce risk of injury  - Provide assistive devices as appropriate  - Consider OT/PT consult to assist with strengthening/mobility  - Encourage toileting schedule  Outcome: Progressing     Problem: PAIN - ADULT  Goal: Verbalizes/displays adequate comfort level or patient's stated pain goal  Description: INTERVENTIONS:  - Encourage pt to monitor pain and request assistance  - Assess pain using appropriate pain scale  - Administer analgesics based on type and severity of pain and evaluate response  - Implement non-pharmacological measures as appropriate and evaluate response  - Consider cultural and social influences on pain  and pain management  - Manage/alleviate anxiety  - Utilize distraction and/or relaxation techniques  - Monitor for opioid side effects  - Notify MD/LIP if interventions unsuccessful or patient reports new pain  - Anticipate increased pain with activity and pre-medicate as appropriate  Outcome: Progressing

## 2024-02-11 NOTE — PROGRESS NOTES
Patient discharged to home accompanied by spouse and staff after dialysis. Discharge instructions given to and understood by patient.Patient left unit with all belongings with no sign of distress noted.

## 2024-02-12 ENCOUNTER — TELEPHONE (OUTPATIENT)
Facility: LOCATION | Age: 79
End: 2024-02-12

## 2024-02-12 DIAGNOSIS — T85.621A MALPOSITION OF PERITONEAL DIALYSIS CATHETER (HCC): Primary | ICD-10-CM

## 2024-02-12 NOTE — PAYOR COMM NOTE
--------------  ADMISSION REVIEW     Payor: [employer] PPO  Subscriber #:  [id number]  Authorization Number: [id number]    Admit date: 2/9/24  Admit time:  4:49 PM       REVIEW DOCUMENTATION:     ED Provider Notes          Patient Seen in: [facility]      History     Chief Complaint   Patient presents with    Fall     Unwitnessed fall while walking in to dialysis today. Unknown if pt struck his head. Pt is A/OX2 per his norm. No known blood thinners.    Trauma 1 & 2     Stated Complaint: Unwitnessed fall while walking in to dialysis today. Unknown if pt struck his h*    Subjective:   HPI    78-year-old male with history of end-stage disease on hemodialysis Tuesday, Thursday, Saturday, dementia, hypertension, presents to EMS after a fall.  Per EMS patient was walking into have peritoneal dialysis catheter evaluated, he reportedly fell, there was no loss of conscious.  Wife was also injured when the patient fell, she herself broke her arm and was seen in the ER by separate physician, after her evaluation she was able to come to the patient's room and reports that they were walking and together she felt him pulling at her arm and she reported that he was \"passing out \".  She does not believe the patient has had.  Patient is had no fevers, no vomiting or diarrhea.  Wife does not express any other concerns at this time.  Review of Systems    Positive for stated complaint: Unwitnessed fall while walking in to dialysis today. Unknown if pt struck his h*  Other systems are as noted in HPI.  Constitutional and vital signs reviewed.      All other systems reviewed and negative except as noted above.    Physical Exam     ED Triage Vitals [02/09/24 1044]   /75   Pulse 81   Resp 18   Temp 97.9 °F (36.6 °C)   Temp src    SpO2 94 %   O2 Device None (Room air)       Current:/73   Pulse 79   Temp 97.9 °F (36.6 °C)   Resp 15   Ht 182.9 cm (6')   Wt 81.6 kg   SpO2 95%   BMI 24.41 kg/m²          Physical Exam    GENERAL: Patient is awake, alert,in no acute distress.  HEENT: no scleral icterus.  Mucous membranes are moist.  Scalp is atraumatic.  NECK: No midline cervical spine tenderness   Back: No midline thoracic or lumbar spine tenderness  HEART: Regular rate and rhythm, no murmurs.  LUNGS: Clear to auscultation bilaterally.  No Rales, no rhonchi, no wheezing, no stridor.  ABDOMEN: Soft, nondistended,non tender  EXTREMITIES: No tenderness to the bilateral clavicles, there is tenderness to the right shoulder, no tenderness to the left shoulder.  No tenderness to bilateral humerus elbow forearm wrist or hand.  No tenderness to the bilateral hips or lower extremities.  SKIN: Warm, dry, intact, no rashes.  NEUROLOGIC EXAM: Tongue midline, no facial drooping, no ptosis, muscle strength +5/5 bilateral upper and lower extremities cerebellar finger to nose intact, no pronator drift, sensation intact.        ED Course     Labs Reviewed   BASIC METABOLIC PANEL (8) - Abnormal; Notable for the following components:       Result Value    Chloride 96 (*)     CO2 34.0 (*)     BUN 32 (*)     Creatinine 8.17 (*)     eGFR-Cr 6 (*)     All other components within normal limits   CBC W/ DIFFERENTIAL - Abnormal; Notable for the following components:    RBC 3.58 (*)     HGB 10.9 (*)     HCT 30.6 (*)     .0 (*)     All other components within normal limits   TROPONIN I HIGH SENSITIVITY - Normal   CBC WITH DIFFERENTIAL WITH PLATELET    Narrative:     The following orders were created for panel order CBC With Differential With Platelet.  Procedure                               Abnormality         Status                     ---------                               -----------         ------                     CBC W/ DIFFERENTIAL[[id number]]          Abnormal            Final result                 Please view results for these tests on the individual orders.     EKG    Rate, intervals and axes as noted on EKG  Report.  Rate: 79  Rhythm: Sinus Rhythm  Reading: Normal sinus rhythm.  No ST elevation.  Inverted T waves laterally.      MDM        Differential diagnosis before testing includes but not limited to skull fracture, intracranial hemorrhage, shoulder fracture, electrolyte abnormality, dysrhythmia, which is a medical condition that poses a threat to life/function    Past Medical History/comorbidities-stage renal disease on dialysis      Radiographic images  I personally reviewed the radiographs and my individual interpretation shows no pneumothorax on chest x-ray  I also reviewed the official reports that showed CT brain no acute intracranial process, CT cervical spine no acute traumatic injury, right shoulder x-ray no fracture or dislocation.  Chest x-ray    History obtained by external source  (EMS, family, law enforcement, )other sources of medical information included history per EMS as in HPI    Discussion of management (consult/physicians, social work, pharmacy,ect) Dr. Taylor, nephrology, Dr. Gracia de santiago hospitalist      Course of Events during Emergency Room Visit include established patient placed on cardiac monitor and pulse ox.  CBC white count 5.1 hemoglobin 10.9 platelet 102.  Chemistry sodium 136 potassium 4.1 BUN 32 creatinine 8.17 glucose 89.  Troponin was 27.  CT brain and cervical spine unremarkable.  Chest x-ray and shoulder x-ray without acute findings.  Patient does have tenderness of the right AC joint concerning for mild AC joint sprain.  Patient presents after fall versus possible syncopal episode, discussed with hospitalist and nephrologist.  Patient will be admitted for further evaluation and treatment.  Patient and wife agree with plan    Shared decision making was utilized           Disposition:    Admission  I have discussed with the patient the results of test, differential diagnosis, and treatment plan. They expressed clear understanding of these instructions and agrees to the plan  provided.       Note to patient: The 21st Century Cures Act makes medical notes like these available to patients in the interest of transparency. However, this is a medical document intended as peer to peer communication. It is written in medical language and may contain abbreviations or verbiage that are unfamiliar. It may appear blunt or direct. Medical documents are intended to carry relevant information, facts as evident, and the clinical opinion of the practitioner.           Admission disposition: 2/9/2024  1:44 PM  Medical Decision Making      Disposition and Plan     Clinical Impression:  1. Syncope and collapse    2. ESRD (end stage renal disease) on dialysis (HCC)         Disposition:  Admit  2/9/2024  1:44 pm    Hospital Problems       Present on Admission  Date Reviewed: 1/30/2024            ICD-10-CM Noted POA    * (Principal) Syncope and collapse R55 2/9/2024 Unknown    ESRD (end stage renal disease) on dialysis (HCC) N18.6, Z99.2 2/9/2024 Unknown       Signed by Casper Lee DO on 2/9/2024  5:18 PM           History and Physical  Admit Date:  2/9/2024     Is this a shared or split note between Advanced Practice Provider and Physician? Yes     ASSESSMENT / PLAN:   Nader Campbell is a 78 year old male from home with his wife with a PMHx significant for s/p heart transplant (2006), BPH, ESRD, (on dialysis T, Th, Sat), HTN, HLD, AMANDO, dementia and gout presents to the hospital s/p syncope and collapse.  See below for details.     Syncope and collapse  -syncopal event while walking into dialysis clinic with wife, fell onto right side  -CT brain negative  -CT spine cervical with cervical spondylosis with no acute traumatic injury  -XR shoulder right with no acute fracture or dislocation, arthritic changes, Hill-Sachs deformity right humoral head  -CXR with no acute cardiopulmonary abnormality  -ECG NSR RBBB   -pain control  -PT/OT  -BP ok  -check orthos     ESRD  -has AVF but he has trouble sitting for the  dialysis sessions so they are trying to transition to PD at home  -PD line in place (placed on 1/30/24), cuff appears pulled out per renal MD, they will have general surgery evaluate catheter  -normal dialysis schedule T,TH,Sat   -takes norco at home for pain at PD site per the wife  -gabapentin on dialysis days     S/P Heart transplant   -tacrolimus     Dementia  -memantine  -risperidone     HLD  HTN  -statin  -not on anti-hypertensives     Gout  -allopurinol     GOC: DNAR comfort care     GOC discussion with wife Katherine and patient, she states that when he was of sound mind he wanted to be a DNAR comfort care.  She does not want the use of medications in the event of an arrest.      MA/ACO Reach  -Re- Entry: no  -Consults: renal, GS  -Discharge Needs: TBD  -Appointments: TBD       FEN  -lytes in am  -diet-renal     Prophy  -SCD     Dispo  -pending clinical course  PCP: Anoop Keys MD       2/10--DISCHARGED  Chief Complaint: f/u syncope     Subjective:   S:  sitting up in bed, R shoulder hurts. No cp/sob/n/v/f/c/LH     Review of Systems:   10 point ROS completed and was negative, except for pertinent positive and negatives stated in subjective.     Objective:   Vital signs:  Temp:  [97.4 °F (36.3 °C)-97.9 °F (36.6 °C)] 97.5 °F (36.4 °C)  Pulse:  [77-84] 79  Resp:  [10-22] 18  BP: ()/(56-82) 102/69  SpO2:  [89 %-97 %] 94 %         Wt Readings from Last 6 Encounters:   02/10/24 186 lb 8.2 oz (84.6 kg)   02/01/24 189 lb (85.7 kg)   01/26/24 186 lb 12.8 oz (84.7 kg)   01/07/24 183 lb (83 kg)   01/05/24 185 lb (83.9 kg)   12/01/23 180 lb (81.6 kg)            Physical Exam:    Gen: No acute distress, alert and oriented , no accessory m use  Pulm: Lungs clear, ok respiratory effort  CV: Heart with regular rate and rhythm, no edema  Abd: Abdomen soft, nontender, nondistended, bowel sounds present, no peritoneal signs  MSK:   no clubbing, no cyanosis. Ttp R shoulder/trap m  Skin: no visible rashes    Psych:    appropriate affect,   fair memory     Results:   Diagnostic Data:       Labs:             Recent Labs   Lab 02/09/24  1047 02/10/24  0604   WBC 5.1 4.9   HGB 10.9* 10.1*   MCV 85.5 86.8   .0* 95.0*              Recent Labs   Lab 02/09/24  1047 02/10/24  0604    137   K 4.1 4.7   CL 96* 97*   CO2 34.0* 31.0   BUN 32* 44*   CREATSERUM 8.17* 10.10*   CA 8.9 8.2*   MG  --  2.9*   PHOS  --  5.5*   GLU 89 96             Recent Labs   Lab 02/10/24  0604   ALT 9*   AST 18   ALB 3.2*            COVID-19        Lab Results   Component Value Date     COVID19 Not Detected 01/31/2024     COVID19 Not Detected 11/03/2023     COVID19 Not Detected 12/22/2022         Medications:    lidocaine-EPINEPHrine  10 mL Subcutaneous Once    epoetin jevon  10,000 Units Intravenous Once in dialysis    allopurinol  50 mg Oral Daily    memantine  20 mg Oral Nightly    sevelamer carbonate  800 mg Oral TID CC    tacrolimus  0.5 mg Oral BID    tacrolimus  1 mg Oral BID    risperiDONE  0.75 mg Oral Nightly         sodium chloride **AND** albumin human, lidocaine-prilocaine, acetaminophen **OR** HYDROcodone-acetaminophen **OR** HYDROcodone-acetaminophen, acetaminophen, polyethylene glycol (PEG 3350), sennosides, bisacodyl, ondansetron, hydrOXYzine            ASSESSMENT / PLAN:       Nader Campbell is a 78 year old male from home with his wife with a PMHx significant for s/p heart transplant (2006), BPH, ESRD, (on dialysis T, Th, Sat), HTN, HLD, AMANDO, dementia and gout presents to the hospital s/p syncope and collapse.  See below for details.     Syncope    -CT brain  and CT cervical spine negative  -XR R shoulder without fx/dislocation, CXR ok  -pain control-norco prn, antiemetics, bowel regimen  -add lidocaine patches  -PT/OT  -orthostats neg     ESRD  -has AVF but he has trouble sitting for the dialysis sessions so they are trying to transition to PD at home  -PD line in place (placed on 1/30/24), cuff appears pulled out per renal,  surgery to see, appreciate  -gabapentin on dialysis days     Stable chronic illnesses:  S/P Heart transplant   -tacrolimus     Dementia  -memantine  -risperidone     HLD  HTN  -statin  -not on anti-hypertensives     Gout  -allopurinol     PPx-scds for now     Dw pt and RN Bren      Thank You,  Lula Fagan MD     [facility] Hospitalist        Vitals (last day) before discharge       Date/Time Temp Pulse Resp BP SpO2 Weight O2 Device O2 Flow Rate (L/min) [facility]    02/10/24 1900 97.8 °F (36.6 °C) 81 18 128/79 99 % -- None (Room air) -- [place]    02/10/24 1619 97.6 °F (36.4 °C) 79 17 126/78 97 % -- None (Room air) -- [facility]    02/10/24 1224 98.3 °F (36.8 °C) 75 16 110/67 91 % -- None (Room air) -- [facility]    02/10/24 0700 -- 79 18 102/69 94 % -- None (Room air) --     02/10/24 0401 97.5 °F (36.4 °C) -- 20 -- -- -- -- -- [place]    02/10/24 0401 -- 81 -- 102/65 93 % 186 lb 8.2 oz -- --     02/10/24 0359 -- 78 -- 104/66 -- -- -- --     02/10/24 0356 -- 78 -- 103/67 93 % -- -- --     02/09/24 2319 -- 77 -- 96/67 96 % -- -- --     02/09/24 2300 97.8 °F (36.6 °C) -- 22 -- -- -- None (Room air) -- [place]    02/09/24 2300 -- 79 -- 84/70 96 % -- -- --     02/09/24 2000 97.7 °F (36.5 °C) -- 17 -- -- -- None (Room air) -- [place]    02/09/24 2000 -- 81 -- 94/62 96 % -- -- --     02/09/24 1708 97.4 °F (36.3 °C) 84 18 88/56 89 % -- None (Room air) -- [place]    02/09/24 1706 97.4 °F (36.3 °C) 84 18 105/73 95 % 185 lb None (Room air) -- [place]    02/09/24 1700 97.4 °F (36.3 °C) 84 18 113/80 91 % -- None (Room air) -- [place]    02/09/24 1630 -- 79 15 102/73 -- -- None (Room air) --     02/09/24 1600 -- 78 17 97/68 -- -- None (Room air) --     02/09/24 1530 -- 81 13 104/72 -- -- -- --     02/09/24 1500 -- 84 21 115/75 95 % -- None (Room air) -- [place]    02/09/24 1430 -- 84 15 111/73 94 % -- None (Room air) -- [place]    02/09/24 1400 -- 83 13 111/80 -- -- -- --     02/09/24 1330 -- 83 10 109/69 97 % -- -- --     02/09/24 1315 -- 83 13  107/67 92 % -- -- --     02/09/24 1245 -- 83 13 113/79 96 % -- None (Room air) --     02/09/24 1230 -- 84 12 112/82 97 % -- -- --     02/09/24 12:24:12 -- 84 14 112/82 95 % -- None (Room air) --     02/09/24 1145 -- 77 11 -- -- -- -- --     02/09/24 1044 97.9 °F (36.6 °C) 81 18 124/75 94 % 180 lb None (Room air) --

## 2024-02-12 NOTE — TELEPHONE ENCOUNTER
Patient's wife called to schedule to catheter removal. I didn't see any notes about this and scheduled an in-office appointment. Wife wants to know if the catheter would be removed in the office, or if they can just schedule the procedure now.     Please advise  Best callback number is corie Murphy [phone]

## 2024-02-12 NOTE — PAYOR COMM NOTE
--------------  DISCHARGE REVIEW    Payor: [employer] P[employer]  Subscriber #:  [id number]  Authorization Number: [id number]    Admit date: 2/9/24  Admit time:   4:49 PM  Discharge Date: 2/10/2024  9:20 PM     Admitting Physician: Molina Hart MD  Attending Physician:  No att. providers found  Primary Care Physician: Anoop Keys MD     Medication Administration Report  for Nader Campbell as of 02/01/24 through 02/10/24  Legend:       Medications 02/01 02/02 02/03 02/04 02/05 02/06 02/07 02/08 02/09 02/10   Completed Medications   epoetin jevon (Epogen, Procrit) 28052 UNIT/ML injection 10,000 Units  Dose: 10,000 Units  Freq: Once in dialysis Route: IV  Start: 02/10/24 1600 End: 02/10/24 1739   Admin Instructions:   Keep refrigerated   Order specific questions:                20867        HYDROmorphone (Dilaudid) 1 MG/ML injection 0.5 mg  Dose: 0.5 mg  Freq: Once Route: IV  Start: 02/09/24 1059 End: 02/09/24 1116            [id number]         Discontinued Medications   Medications 02/01 02/02 02/03 02/04 02/05 02/06 02/07 02/08 02/09 02/10   acetaminophen (Tylenol Extra Strength) tab 500 mg  Dose: 500 mg  Freq: Every 4 hours PRN Route: OR  PRN Reason: Fever  PRN Comment: equal to or greater than 100.4  Start: 02/09/24 1709 End: 02/10/24 2324   Admin Instructions:   do not initiate oral therapy until 6-8 hours after the last IV acetaminophen dose if IV acetaminophen was used previously                 acetaminophen (Tylenol) tab 650 mg  Dose: 650 mg  Freq: Every 4 hours PRN Route: OR  PRN Reason: mild pain  Start: 02/09/24 1709 End: 02/10/24 2324   Admin Instructions:   Use PRN reason as a guide and follow range order policy            3     4         Or  HYDROcodone-acetaminophen (Norco) 5-325 MG per tab 1 tablet  Dose: 1 tablet  Freq: Every 4 hours PRN Route: OR  PRN Reason: moderate pain  Start: 02/09/24 1709 End: 02/10/24 2324   Admin Instructions:   Use PRN reason as a guide and follow range order  policy            23033     [id number]         Or  HYDROcodone-acetaminophen (Norco) 5-325 MG per tab 2 tablet  Dose: 2 tablet  Freq: Every 4 hours PRN Route: OR  PRN Reason: severe pain  Start: 02/09/24 1709 End: 02/10/24 2324   Admin Instructions:   Use PRN reason as a guide and follow range order policy            7     8          sodium chloride 0.9 % IV bolus 100 mL  Dose: 100 mL  Freq: Every 30 min PRN Route: IV  PRN Comment: For SBP < 90 during dialysis  Start: 02/09/24 1709 End: 02/10/24 2324   Admin Instructions:   Bolus 100mL NS x 2 doses for SBP < 90 mmHg during dialysis.  If SBP still low after 2 doses, proceed to albumin.                And  albumin human (Albumin) 25% injection 25 g  Dose: 25 g  Freq: As needed with Dialysis Route: IV  PRN Comment: For SBP < 90 during dialysis  Start: 02/09/24 1709 End: 02/10/24 2324   Admin Instructions:   If SBP still less than 90 mmHg after NS boluses, give albumin x 1 dose.  Notify provider if SBP continues to be less than 90 mmHg after albumin dose.   Order specific questions:                   allopurinol (Zyloprim) tab 50 mg  Dose: 50 mg  Freq: Daily Route: OR  Start: 02/10/24 0930 End: 02/10/24 2324             [id number]        bisacodyl (Dulcolax) 10 MG rectal suppository 10 mg  Dose: 10 mg  Freq: Daily as needed Route: RE  PRN Reason: constipation  Start: 02/09/24 1709 End: 02/10/24 2324   Admin Instructions:   Use after MiraLAX and Senokot.                hydrOXYzine (Atarax) tab 25 mg  Dose: 25 mg  Freq: Every 8 hours PRN Route: OR  PRN Reason: Itching  Start: 02/09/24 1935 End: 02/10/24 2324                lidocaine 1%-EPINEPHrine 1:100,000 (Xylocaine-Epinephrine) injection  Dose: 10 mL  Freq: Once Route: SC  Start: 02/10/24 0930 End: 02/10/24 2324             10        lidocaine-menthol 4-1 % patch 2 patch  Dose: 2 patch  Freq: Daily Route: TD  Start: 02/10/24 0945 End: 02/10/24 2324   Order specific questions:                [id number]     [id number]         lidocaine-prilocaine (Emla) 2.5-2.5 % cream  Freq: As needed Route: TOP  PRN Reason: mild pain  Start: 02/10/24 0000 End: 02/10/24 2324   Admin Instructions:   Apply to fistula site one hour prior to dialysis and cover with occlusive dressing.   Order specific questions:                   memantine (Namenda) tab 20 mg  Dose: 20 mg  Freq: Nightly Route: OR  Start: 02/09/24 2100 End: 02/10/24 2324            [id number]      [id number]        ondansetron (Zofran) 4 MG/2ML injection 4 mg  Dose: 4 mg  Freq: Every 6 hours PRN Route: IV  PRN Reasons: Nausea,vomiting  Start: 02/09/24 1709 End: 02/10/24 2324   Admin Instructions:   Default antiemetic sequence (unless otherwise preferred by patient):  1. ondansetron (Zofran) 2. metoclopramide (Reglan)  Wait 15 minutes before proceeding to next medication in sequence.  Follow therapeutic duplication policy.                polyethylene glycol (PEG 3350) (Miralax) 17 g oral packet 17 g  Dose: 17 g  Freq: DAILY PRN Route: OR  PRN Reason: constipation  Start: 02/09/24 1709 End: 02/10/24 2324   Admin Instructions:   Use prior to Senokot and Dulcolax.  1 packet=17gm=1 heaping tablespoon; Dissolve in 8 oz of water                pravastatin (Pravachol) tab 20 mg  Dose: 20 mg  Freq: Nightly Route: OR  Start: 02/09/24 2100 End: 02/09/24 2059                risperiDONE (RisperDAL) tab 0.75 mg  Dose: 0.75 mg  Freq: Nightly Route: OR  Start: 02/09/24 2100 End: 02/10/24 2324            [id number]      [id number]        sennosides (Senokot) tab 17.2 mg  Dose: 17.2 mg  Freq: Nightly PRN Route: OR  PRN Comment: constipation, as needed if no bowel movement that day  Start: 02/09/24 1709 End: 02/10/24 2324   Admin Instructions:   Use after MiraLAX.  Use prior to Dulcolax.                sevelamer carbonate (Renvela) tab 800 mg  Dose: 800 mg  Freq: 3 times daily with meals Route: OR  Start: 02/10/24 0800 End: 02/10/24 2324             [id number]     [id number]     (6040) [C]19        tacrolimus (Prograf) cap 0.5  mg  Dose: 0.5 mg  Freq: 2 times daily Route: OR  Indications of Use: HEART TRANSPLANT STATUS  Start: 02/09/24 2100 End: 02/10/24 2324   Admin Instructions:   CAUTION: HAZARDOUS DRUG            2231 [C]20      [id number]     [id number]        tacrolimus (Prograf) cap 1 mg  Dose: 1 mg  Freq: 2 times daily Route: OR  Indications of Use: HEART TRANSPLANT STATUS  Start: 02/09/24 2100 End: 02/10/24 2324   Admin Instructions:   CAUTION: HAZARDOUS DRUG            [id number]      [id number]     [id number]        Medications 02/01 02/02 02/03 02/04 02/05 02/06 02/07 02/08 02/09 02/10      MST Score 2 or >

## 2024-02-15 ENCOUNTER — HOSPITAL ENCOUNTER (INPATIENT)
Facility: HOSPITAL | Age: 79
LOS: 4 days | Discharge: HOME | End: 2024-02-19
Attending: EMERGENCY MEDICINE | Admitting: HOSPITALIST
Payer: MEDICARE

## 2024-02-15 ENCOUNTER — APPOINTMENT (OUTPATIENT)
Dept: CT IMAGING | Facility: HOSPITAL | Age: 79
End: 2024-02-15
Attending: EMERGENCY MEDICINE
Payer: MEDICARE

## 2024-02-15 DIAGNOSIS — T85.71XA PERITONEAL DIALYSIS CATHETER EXIT SITE INFECTION, INITIAL ENCOUNTER (HCC): ICD-10-CM

## 2024-02-15 DIAGNOSIS — N18.6 ESRD (END STAGE RENAL DISEASE) ON DIALYSIS (HCC): ICD-10-CM

## 2024-02-15 DIAGNOSIS — N18.6 ESRD ON HEMODIALYSIS (HCC): ICD-10-CM

## 2024-02-15 DIAGNOSIS — Z99.2 ESRD ON HEMODIALYSIS (HCC): ICD-10-CM

## 2024-02-15 DIAGNOSIS — T85.691A: ICD-10-CM

## 2024-02-15 DIAGNOSIS — Z99.2 ESRD (END STAGE RENAL DISEASE) ON DIALYSIS (HCC): ICD-10-CM

## 2024-02-15 DIAGNOSIS — Z91.89 INCREASED INFECTION RISK: ICD-10-CM

## 2024-02-15 DIAGNOSIS — L03.311 ABDOMINAL WALL CELLULITIS: Primary | ICD-10-CM

## 2024-02-15 LAB
ALBUMIN SERPL-MCNC: 3.1 G/DL (ref 3.4–5)
ALBUMIN/GLOB SERPL: 0.7 {RATIO} (ref 1–2)
ALP LIVER SERPL-CCNC: 217 U/L
ALT SERPL-CCNC: 16 U/L
ANION GAP SERPL CALC-SCNC: 5 MMOL/L (ref 0–18)
AST SERPL-CCNC: 47 U/L (ref 15–37)
BASOPHILS # BLD AUTO: 0.02 X10(3) UL (ref 0–0.2)
BASOPHILS NFR BLD AUTO: 0.4 %
BILIRUB SERPL-MCNC: 1.1 MG/DL (ref 0.1–2)
BUN BLD-MCNC: 46 MG/DL (ref 9–23)
CALCIUM BLD-MCNC: 8.5 MG/DL (ref 8.5–10.1)
CHLORIDE SERPL-SCNC: 100 MMOL/L (ref 98–112)
CO2 SERPL-SCNC: 27 MMOL/L (ref 21–32)
CREAT BLD-MCNC: 10.2 MG/DL
EGFRCR SERPLBLD CKD-EPI 2021: 5 ML/MIN/1.73M2 (ref 60–?)
EOSINOPHIL # BLD AUTO: 0.28 X10(3) UL (ref 0–0.7)
EOSINOPHIL NFR BLD AUTO: 5.8 %
ERYTHROCYTE [DISTWIDTH] IN BLOOD BY AUTOMATED COUNT: 15.5 %
GLOBULIN PLAS-MCNC: 4.7 G/DL (ref 2.8–4.4)
GLUCOSE BLD-MCNC: 92 MG/DL (ref 70–99)
HCT VFR BLD AUTO: 30.8 %
HGB BLD-MCNC: 10.7 G/DL
IMM GRANULOCYTES # BLD AUTO: 0.01 X10(3) UL (ref 0–1)
IMM GRANULOCYTES NFR BLD: 0.2 %
LYMPHOCYTES # BLD AUTO: 1.25 X10(3) UL (ref 1–4)
LYMPHOCYTES NFR BLD AUTO: 25.8 %
MCH RBC QN AUTO: 29.8 PG (ref 26–34)
MCHC RBC AUTO-ENTMCNC: 34.7 G/DL (ref 31–37)
MCV RBC AUTO: 85.8 FL
MONOCYTES # BLD AUTO: 0.57 X10(3) UL (ref 0.1–1)
MONOCYTES NFR BLD AUTO: 11.8 %
NEUTROPHILS # BLD AUTO: 2.72 X10 (3) UL (ref 1.5–7.7)
NEUTROPHILS # BLD AUTO: 2.72 X10(3) UL (ref 1.5–7.7)
NEUTROPHILS NFR BLD AUTO: 56 %
OSMOLALITY SERPL CALC.SUM OF ELEC: 286 MOSM/KG (ref 275–295)
PLATELET # BLD AUTO: 128 10(3)UL (ref 150–450)
POTASSIUM SERPL-SCNC: 4.8 MMOL/L (ref 3.5–5.1)
POTASSIUM SERPL-SCNC: 5.9 MMOL/L (ref 3.5–5.1)
PROT SERPL-MCNC: 7.8 G/DL (ref 6.4–8.2)
RBC # BLD AUTO: 3.59 X10(6)UL
SODIUM SERPL-SCNC: 132 MMOL/L (ref 136–145)
WBC # BLD AUTO: 4.9 X10(3) UL (ref 4–11)

## 2024-02-15 PROCEDURE — 74176 CT ABD & PELVIS W/O CONTRAST: CPT | Performed by: EMERGENCY MEDICINE

## 2024-02-15 RX ORDER — RISPERIDONE 0.25 MG/1
0.5 TABLET ORAL NIGHTLY
Status: DISCONTINUED | OUTPATIENT
Start: 2024-02-15 | End: 2024-02-19

## 2024-02-15 RX ORDER — TACROLIMUS 0.5 MG/1
0.5 CAPSULE ORAL 2 TIMES DAILY
Status: DISCONTINUED | OUTPATIENT
Start: 2024-02-15 | End: 2024-02-19

## 2024-02-15 RX ORDER — SEVELAMER CARBONATE 800 MG/1
800 TABLET, FILM COATED ORAL
Status: DISCONTINUED | OUTPATIENT
Start: 2024-02-16 | End: 2024-02-19

## 2024-02-15 RX ORDER — GABAPENTIN 100 MG/1
100 CAPSULE ORAL
Status: DISCONTINUED | OUTPATIENT
Start: 2024-02-17 | End: 2024-02-19

## 2024-02-15 RX ORDER — ALLOPURINOL 100 MG/1
50 TABLET ORAL DAILY
Status: DISCONTINUED | OUTPATIENT
Start: 2024-02-16 | End: 2024-02-19

## 2024-02-15 RX ORDER — MEMANTINE HYDROCHLORIDE 10 MG/1
20 TABLET ORAL NIGHTLY
Status: DISCONTINUED | OUTPATIENT
Start: 2024-02-15 | End: 2024-02-19

## 2024-02-15 RX ORDER — TACROLIMUS 1 MG/1
1 CAPSULE ORAL EVERY MORNING
Status: DISCONTINUED | OUTPATIENT
Start: 2024-02-16 | End: 2024-02-19

## 2024-02-15 RX ORDER — PRAVASTATIN SODIUM 20 MG
20 TABLET ORAL NIGHTLY
Status: DISCONTINUED | OUTPATIENT
Start: 2024-02-15 | End: 2024-02-19

## 2024-02-15 NOTE — ED PROVIDER NOTES
Patient Seen in: [facility] Emergency Department      History     Chief Complaint   Patient presents with    Cath Tube Problem     Stated Complaint: pulled dialysis catheter, dementia, recent dc from inpatient last saturday    Subjective:   HPI    78-year-old male with a past medical history as below including dementia, ESRD, hypertension, CHF status post heart transplant brought by wife due to pain and bloody drainage from wound left abdominal PD catheter.  She states catheter was placed last month to transition him from hemodialysis to peritoneal.  Wife states patient was admitted last week after passing out and the catheter was noted to be partially dislodged at that time but cannot be removed at the bedside.  She states she is scheduled to have it removed next month but it has been causing him a lot of pain and needs it removed sooner.  She is concerned for infection with some bleeding that she noted over the past few days.  She denies fever, vomiting or diarrhea.  Patient last received hemodialysis yesterday.  Patient unable to provide any significant history due to dementia.    Objective:   Past Medical History:   Diagnosis Date    Back problem     BPH (benign prostatic hyperplasia)     Cataract     CKD (chronic kidney disease)     Congestive heart disease (HCC)     before transplant in 2006    Dialysis patient ([facility]) 07/27/2023    HEMODIALYSIS TUES/THURS/SAT 3.5HRS    Esophageal reflux     Fistula     RIGHT ARM    Glaucoma     Gout     Hearing impairment     HA's x2, doesnt wear    Heart transplanted ([facility]) 2006 2006-@ [facility]    High blood pressure     High cholesterol     Hypercholesterolemia     Muscle weakness     uses a cane- balance issues    Obstructive sleep apnea syndrome 02/04/2009    Problems with swallowing     Renal disorder     CKD    Short-term memory loss     Shortness of breath     Sleep apnea     had UPPP did not need device after    Visual impairment     glasses               No pertinent past surgical history.              No pertinent social history.            Review of Systems    Positive for stated complaint: pulled dialysis catheter, dementia, recent dc from inpatient last saturday  Other systems are as noted in HPI.  Constitutional and vital signs reviewed.      All other systems reviewed and negative except as noted above.    Physical Exam     ED Triage Vitals [02/15/24 1349]   /71   Pulse 81   Resp 16   Temp 98.4 °F (36.9 °C)   Temp src Temporal   SpO2 96 %   O2 Device None (Room air)       Current:/79   Pulse 76   Temp 98.4 °F (36.9 °C) (Temporal)   Resp 14   Ht 180.3 cm (5' 11\")   Wt 83.5 kg   SpO2 94%   BMI 25.66 kg/m²         Physical Exam  Vitals and nursing note reviewed.   Constitutional:       General: He is not in acute distress.     Appearance: He is well-developed. He is not ill-appearing.   HENT:      Head: Normocephalic and atraumatic.      Mouth/Throat:      Mouth: Mucous membranes are moist.   Eyes:      General: No scleral icterus.     Extraocular Movements: Extraocular movements intact.   Cardiovascular:      Rate and Rhythm: Normal rate and regular rhythm.   Pulmonary:      Effort: Pulmonary effort is normal.      Breath sounds: Normal breath sounds.   Abdominal:      General: There is no distension.      Tenderness: There is abdominal tenderness. There is no guarding or rebound.      Comments: Left abdominal PD catheter site with mild edema and tenderness with some yellow exudate at the stoma   Musculoskeletal:      Cervical back: Neck supple.   Skin:     General: Skin is warm and dry.      Capillary Refill: Capillary refill takes less than 2 seconds.   Neurological:      Mental Status: He is alert.      GCS: GCS eye subscore is 4. GCS verbal subscore is 5. GCS motor subscore is 6.   Psychiatric:         Mood and Affect: Mood normal.         Behavior: Behavior normal.               ED Course     Labs Reviewed   COMP METABOLIC PANEL  (14) - Abnormal; Notable for the following components:       Result Value    Sodium 132 (*)     Potassium 5.9 (*)     BUN 46 (*)     Creatinine 10.20 (*)     eGFR-Cr 5 (*)     AST 47 (*)     Alkaline Phosphatase 217 (*)     Albumin 3.1 (*)     Globulin  4.7 (*)     A/G Ratio 0.7 (*)     All other components within normal limits   CBC W/ DIFFERENTIAL - Abnormal; Notable for the following components:    RBC 3.59 (*)     HGB 10.7 (*)     HCT 30.8 (*)     .0 (*)     All other components within normal limits   POTASSIUM - Normal   CBC WITH DIFFERENTIAL WITH PLATELET    Narrative:     The following orders were created for panel order CBC With Differential With Platelet.  Procedure                               Abnormality         Status                     ---------                               -----------         ------                     CBC W/ DIFFERENTIAL[[id number]]          Abnormal            Final result                 Please view results for these tests on the individual orders.   RAINBOW DRAW LAVENDER   RAINBOW DRAW LIGHT GREEN   RAINBOW DRAW BLUE             CT ABDOMEN+PELVIS(CPT=74176)    Result Date: 2/15/2024  CONCLUSION:   1. Peritoneal dialysis catheter along the left anterior abdominal wall where there is mild surrounding inflammatory change in fatty induration that could represent changes of cellulitis.  No drainable soft tissue abscess identified in this region by noncontrast exam.  2. Possible sludge in the gallbladder.  There is gallbladder wall thickening and mild inflammatory changes along the gallbladder fossa tracking into the cortez hepatis raising the possibility of acute cholecystitis in the appropriate clinical setting.  Clinical correlation recommended.  3. Small amount of ascites in the abdomen and pelvis.  Please see above for further details.  LOCATION:  [id number]   Dictated by (CST): Gui Harris MD on 2/15/2024 at 7:08 PM     Finalized by (CST): Gui Harris MD on 2/15/2024 at  7:14 PM               [facility]   78-year-old male with a past medical history as below including dementia, ESRD, hypertension, CHF status post heart transplant brought by wife due to pain and bloody drainage from wound left abdominal PD catheter.      Differential includes but is not limited to PD catheter dislodgment, cellulitis, abscess    Labs show ESRD with normal electrolytes.  WBC is normal without left shift.    Independent interpretation of CT abdomen/pelvis shows no abscess but does show some inflammatory changes.  Radiology report reviewed as above noting inflammatory changes could represent cellulitis but no drainable soft tissue abscess.    Will admit for further management and removal.  Removal of PD catheter.Patient treated with a dose of ceftriaxone.    Discussed with general surgery Dr. Pike.  Discussed with hospitalist Dr. Frias,      Medical Decision Making  Amount and/or Complexity of Data Reviewed  Independent Historian: spouse  Labs: ordered. Decision-making details documented in ED Course.  Radiology: ordered and independent interpretation performed. Decision-making details documented in ED Course.  Discussion of management or test interpretation with external provider(s): Hospitalist, general surgery    Risk  Decision regarding hospitalization.        Disposition and Plan     Clinical Impression:  1. Abdominal wall cellulitis    2. Peritoneal dialysis catheter exit site infection, initial encounter (HCC)    3. Mechanical complication of peritoneal dialysis catheter, initial encounter (HCC)    4. ESRD (end stage renal disease) on dialysis (HCC)         Disposition:  Admit  2/15/2024  8:17 pm    Follow-up:  No follow-up provider specified.        Medications Prescribed:  Current Discharge Medication List                            Hospital Problems       Present on Admission  Date Reviewed: 1/30/2024            ICD-10-CM Noted POA    * (Principal) Abdominal wall cellulitis L03.311 2/15/2024  Unknown

## 2024-02-16 ENCOUNTER — ANESTHESIA EVENT (OUTPATIENT)
Dept: SURGERY | Facility: HOSPITAL | Age: 79
End: 2024-02-16
Payer: MEDICARE

## 2024-02-16 ENCOUNTER — ANESTHESIA (OUTPATIENT)
Dept: SURGERY | Facility: HOSPITAL | Age: 79
End: 2024-02-16
Payer: MEDICARE

## 2024-02-16 ENCOUNTER — TELEPHONE (OUTPATIENT)
Facility: LOCATION | Age: 79
End: 2024-02-16

## 2024-02-16 DIAGNOSIS — T85.621A MALPOSITION OF PERITONEAL DIALYSIS CATHETER (HCC): Primary | ICD-10-CM

## 2024-02-16 LAB
ANION GAP SERPL CALC-SCNC: 7 MMOL/L (ref 0–18)
ATRIAL RATE: 85 BPM
BUN BLD-MCNC: 54 MG/DL (ref 9–23)
CALCIUM BLD-MCNC: 8.3 MG/DL (ref 8.5–10.1)
CHLORIDE SERPL-SCNC: 99 MMOL/L (ref 98–112)
CO2 SERPL-SCNC: 29 MMOL/L (ref 21–32)
CREAT BLD-MCNC: 11 MG/DL
EGFRCR SERPLBLD CKD-EPI 2021: 4 ML/MIN/1.73M2 (ref 60–?)
ERYTHROCYTE [DISTWIDTH] IN BLOOD BY AUTOMATED COUNT: 14.9 %
GLUCOSE BLD-MCNC: 84 MG/DL (ref 70–99)
GLUCOSE BLD-MCNC: 88 MG/DL (ref 70–99)
HCT VFR BLD AUTO: 27.3 %
HGB BLD-MCNC: 9.9 G/DL
MAGNESIUM SERPL-MCNC: 2.8 MG/DL (ref 1.6–2.6)
MCH RBC QN AUTO: 30.5 PG (ref 26–34)
MCHC RBC AUTO-ENTMCNC: 36.3 G/DL (ref 31–37)
MCV RBC AUTO: 84 FL
OSMOLALITY SERPL CALC.SUM OF ELEC: 294 MOSM/KG (ref 275–295)
P AXIS: 90 DEGREES
P-R INTERVAL: 342 MS
PLATELET # BLD AUTO: 118 10(3)UL (ref 150–450)
POTASSIUM SERPL-SCNC: 4.5 MMOL/L (ref 3.5–5.1)
Q-T INTERVAL: 460 MS
QRS DURATION: 136 MS
QTC CALCULATION (BEZET): 547 MS
R AXIS: 88 DEGREES
RBC # BLD AUTO: 3.25 X10(6)UL
SODIUM SERPL-SCNC: 135 MMOL/L (ref 136–145)
T AXIS: -55 DEGREES
VENTRICULAR RATE: 85 BPM
WBC # BLD AUTO: 4.1 X10(3) UL (ref 4–11)

## 2024-02-16 PROCEDURE — 99222 1ST HOSP IP/OBS MODERATE 55: CPT | Performed by: INTERNAL MEDICINE

## 2024-02-16 PROCEDURE — 49422 REMOVE TUNNELED IP CATH: CPT | Performed by: SURGERY

## 2024-02-16 PROCEDURE — 0WPG03Z REMOVAL OF INFUSION DEVICE FROM PERITONEAL CAVITY, OPEN APPROACH: ICD-10-PCS | Performed by: SURGERY

## 2024-02-16 RX ORDER — ACETAMINOPHEN 500 MG
1000 TABLET ORAL ONCE AS NEEDED
Status: DISCONTINUED | OUTPATIENT
Start: 2024-02-16 | End: 2024-02-16 | Stop reason: HOSPADM

## 2024-02-16 RX ORDER — LIDOCAINE HYDROCHLORIDE 40 MG/ML
SOLUTION TOPICAL AS NEEDED
Status: DISCONTINUED | OUTPATIENT
Start: 2024-02-16 | End: 2024-02-16 | Stop reason: SURG

## 2024-02-16 RX ORDER — CEFAZOLIN SODIUM/WATER 2 G/20 ML
SYRINGE (ML) INTRAVENOUS AS NEEDED
Status: DISCONTINUED | OUTPATIENT
Start: 2024-02-16 | End: 2024-02-16 | Stop reason: SURG

## 2024-02-16 RX ORDER — NALOXONE HYDROCHLORIDE 0.4 MG/ML
80 INJECTION, SOLUTION INTRAMUSCULAR; INTRAVENOUS; SUBCUTANEOUS AS NEEDED
Status: DISCONTINUED | OUTPATIENT
Start: 2024-02-16 | End: 2024-02-16 | Stop reason: HOSPADM

## 2024-02-16 RX ORDER — HYDROCODONE BITARTRATE AND ACETAMINOPHEN 5; 325 MG/1; MG/1
2 TABLET ORAL ONCE AS NEEDED
Status: DISCONTINUED | OUTPATIENT
Start: 2024-02-16 | End: 2024-02-16 | Stop reason: HOSPADM

## 2024-02-16 RX ORDER — SODIUM CHLORIDE, SODIUM LACTATE, POTASSIUM CHLORIDE, CALCIUM CHLORIDE 600; 310; 30; 20 MG/100ML; MG/100ML; MG/100ML; MG/100ML
INJECTION, SOLUTION INTRAVENOUS CONTINUOUS
Status: DISCONTINUED | OUTPATIENT
Start: 2024-02-16 | End: 2024-02-16 | Stop reason: HOSPADM

## 2024-02-16 RX ORDER — ONDANSETRON 2 MG/ML
INJECTION INTRAMUSCULAR; INTRAVENOUS AS NEEDED
Status: DISCONTINUED | OUTPATIENT
Start: 2024-02-16 | End: 2024-02-16 | Stop reason: SURG

## 2024-02-16 RX ORDER — ACETAMINOPHEN 325 MG/1
650 TABLET ORAL EVERY 6 HOURS PRN
Status: DISCONTINUED | OUTPATIENT
Start: 2024-02-16 | End: 2024-02-19

## 2024-02-16 RX ORDER — LIDOCAINE HYDROCHLORIDE AND EPINEPHRINE 10; 10 MG/ML; UG/ML
INJECTION, SOLUTION INFILTRATION; PERINEURAL AS NEEDED
Status: DISCONTINUED | OUTPATIENT
Start: 2024-02-16 | End: 2024-02-16

## 2024-02-16 RX ORDER — SODIUM CHLORIDE, SODIUM LACTATE, POTASSIUM CHLORIDE, CALCIUM CHLORIDE 600; 310; 30; 20 MG/100ML; MG/100ML; MG/100ML; MG/100ML
INJECTION, SOLUTION INTRAVENOUS CONTINUOUS PRN
Status: DISCONTINUED | OUTPATIENT
Start: 2024-02-16 | End: 2024-02-16 | Stop reason: SURG

## 2024-02-16 RX ORDER — HYDROMORPHONE HYDROCHLORIDE 1 MG/ML
0.3 INJECTION, SOLUTION INTRAMUSCULAR; INTRAVENOUS; SUBCUTANEOUS EVERY 5 MIN PRN
Status: DISCONTINUED | OUTPATIENT
Start: 2024-02-16 | End: 2024-02-16 | Stop reason: HOSPADM

## 2024-02-16 RX ORDER — LIDOCAINE HYDROCHLORIDE 10 MG/ML
INJECTION, SOLUTION EPIDURAL; INFILTRATION; INTRACAUDAL; PERINEURAL AS NEEDED
Status: DISCONTINUED | OUTPATIENT
Start: 2024-02-16 | End: 2024-02-16 | Stop reason: SURG

## 2024-02-16 RX ORDER — IBUPROFEN 400 MG/1
400 TABLET ORAL EVERY 6 HOURS PRN
Status: DISCONTINUED | OUTPATIENT
Start: 2024-02-16 | End: 2024-02-19

## 2024-02-16 RX ORDER — HYDROMORPHONE HYDROCHLORIDE 1 MG/ML
0.2 INJECTION, SOLUTION INTRAMUSCULAR; INTRAVENOUS; SUBCUTANEOUS EVERY 5 MIN PRN
Status: DISCONTINUED | OUTPATIENT
Start: 2024-02-16 | End: 2024-02-16 | Stop reason: HOSPADM

## 2024-02-16 RX ORDER — HYDROCODONE BITARTRATE AND ACETAMINOPHEN 5; 325 MG/1; MG/1
1 TABLET ORAL ONCE AS NEEDED
Status: DISCONTINUED | OUTPATIENT
Start: 2024-02-16 | End: 2024-02-16 | Stop reason: HOSPADM

## 2024-02-16 RX ORDER — LABETALOL HYDROCHLORIDE 5 MG/ML
5 INJECTION, SOLUTION INTRAVENOUS EVERY 5 MIN PRN
Status: DISCONTINUED | OUTPATIENT
Start: 2024-02-16 | End: 2024-02-16 | Stop reason: HOSPADM

## 2024-02-16 RX ORDER — ONDANSETRON 2 MG/ML
4 INJECTION INTRAMUSCULAR; INTRAVENOUS EVERY 6 HOURS PRN
Status: DISCONTINUED | OUTPATIENT
Start: 2024-02-16 | End: 2024-02-16 | Stop reason: HOSPADM

## 2024-02-16 RX ORDER — HYDROMORPHONE HYDROCHLORIDE 1 MG/ML
0.4 INJECTION, SOLUTION INTRAMUSCULAR; INTRAVENOUS; SUBCUTANEOUS EVERY 5 MIN PRN
Status: DISCONTINUED | OUTPATIENT
Start: 2024-02-16 | End: 2024-02-16 | Stop reason: HOSPADM

## 2024-02-16 RX ORDER — ALBUMIN (HUMAN) 12.5 G/50ML
25 SOLUTION INTRAVENOUS
Status: ACTIVE | OUTPATIENT
Start: 2024-02-16 | End: 2024-02-18

## 2024-02-16 RX ORDER — ROCURONIUM BROMIDE 10 MG/ML
INJECTION, SOLUTION INTRAVENOUS AS NEEDED
Status: DISCONTINUED | OUTPATIENT
Start: 2024-02-16 | End: 2024-02-16 | Stop reason: SURG

## 2024-02-16 RX ADMIN — SODIUM CHLORIDE, SODIUM LACTATE, POTASSIUM CHLORIDE, CALCIUM CHLORIDE: 600; 310; 30; 20 INJECTION, SOLUTION INTRAVENOUS at 19:18:00

## 2024-02-16 RX ADMIN — CEFAZOLIN SODIUM/WATER 2 G: 2 G/20 ML SYRINGE (ML) INTRAVENOUS at 19:32:00

## 2024-02-16 RX ADMIN — ONDANSETRON 4 MG: 2 INJECTION INTRAMUSCULAR; INTRAVENOUS at 19:41:00

## 2024-02-16 RX ADMIN — ROCURONIUM BROMIDE 50 MG: 10 INJECTION, SOLUTION INTRAVENOUS at 19:27:00

## 2024-02-16 RX ADMIN — LIDOCAINE HYDROCHLORIDE 50 MG: 10 INJECTION, SOLUTION EPIDURAL; INFILTRATION; INTRACAUDAL; PERINEURAL at 19:27:00

## 2024-02-16 RX ADMIN — LIDOCAINE HYDROCHLORIDE 4 ML: 40 SOLUTION TOPICAL at 19:29:00

## 2024-02-16 NOTE — DISCHARGE INSTRUCTIONS
Sometimes managing your health at home requires assistance.  The [facility] team has recognized your preference to use [facility].  They can be reached by phone at [phone].  The fax number for your reference is [phone].

## 2024-02-16 NOTE — PLAN OF CARE
A&Ox1. Pt is only alert to self. VSS. RA. . Denies chest pain and SOB.   Telemetry: NSR.   GI: Abdomen soft, nondistended. Passing gas. Bm 2/16.   Denies nausea.   : Does not void d/t HD.   Pt denies pain.   Up with standby assist and a walker.   Bed alarm on.   Unsteady on his feet, shuffles as he walks.   PD cath is taped to [facility]. Does not appear red, no complaints of itching.   Diet: NPO, sips with meds.   IV saline locked. Pt ripped out IV overnight. New IV placed.   All appropriate safety measures in place. All questions and concerns addressed.

## 2024-02-16 NOTE — ED QUICK NOTES
Rounding Completed    Patient sleeping in room, easily awakes, Plan of Care reviewed. To CT scan at this time.   Bed is locked and in lowest position. Call light within reach.

## 2024-02-16 NOTE — CONSULTS
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  Unfortunately he has been pulling at [facility] site and has had bloody drainage with pain/erythema at exit site.      History:  Past Medical History:   Diagnosis Date    Back problem     BPH (benign prostatic hyperplasia)     Cataract     CKD (chronic kidney disease)     Congestive heart disease (HCC)     before transplant in     Dialysis patient ([facility]) 2023    HEMODIALYSIS TUES/THURS/SAT 3.5HRS    Esophageal reflux     Fistula     RIGHT ARM    Glaucoma     Gout     Hearing impairment     HA's x2, doesnt wear    Heart transplanted ([facility]) 2006-@ [facility]    High blood pressure     High cholesterol     Hypercholesterolemia     Muscle weakness     uses a cane- balance issues    Obstructive sleep apnea syndrome 2009    Problems with swallowing     Renal disorder     CKD    Short-term memory loss     Shortness of breath     Sleep apnea     had UPPP did not need device after    Visual impairment     glasses     Past Surgical History:   Procedure Laterality Date    ARTHROSCOPY OF JOINT UNLISTED Right     rotator cuff repair    AV FISTULA REVISION, OPEN      incorrect info per wife    COLONOSCOPY      HEART TRANSPLANT      [facility]     KNEE REPLACEMENT SURGERY Left 2018    x2    OTHER SURGICAL HISTORY  2021    Cysto Dr. Delgado    OTHER SURGICAL HISTORY  2024    PERITONEAL DIALYSIS CATHETER PLACEMENT    ROTATOR CUFF REPAIR      SINUS  SURGERY        TONSILLECTOMY      TOTAL KNEE REPLACEMENT Left     2 on this leg    UPPER GI ENDOSCOPY,EXAM       Family History   Problem Relation Age of Onset    Heart Disorder Father         heart attack    Heart Disorder Mother         CHF      reports that he quit smoking about 14 years ago. His smoking use included cigarettes. He has never used smokeless tobacco. He reports current alcohol use. He reports that he does not currently use drugs.    Allergies:  Allergies   Allergen Reactions    Amlodipine OTHER (SEE COMMENTS) and SWELLING     Leg swelling    Dalteparin HIVES and RASH     Fragmin      Lisinopril OTHER (SEE COMMENTS)     Hyperkalemia     Penicillins HIVES, RASH, SWELLING and SHORTNESS OF BREATH    Oxycodone OTHER (SEE COMMENTS)     Lost his taste and wt loss       Medications:    Current Facility-Administered Medications:     allopurinol (Zyloprim) tab 50 mg, 50 mg, Oral, Daily    [START ON 2024] gabapentin (Neurontin) cap 100 mg, 100 mg, Oral, Q ,  and Sa    memantine (Namenda) tab 20 mg, 20 mg, Oral, Nightly    pravastatin (Pravachol) tab 20 mg, 20 mg, Oral, Nightly    risperiDONE (RisperDAL) tab 0.5 mg, 0.5 mg, Oral, Nightly    sevelamer carbonate (Renvela) tab 800 mg, 800 mg, Oral, TID CC    tacrolimus (Prograf) cap 0.5 mg, 0.5 mg, Oral, BID    tacrolimus (Prograf) cap 1 mg, 1 mg, Oral, QAM    cefTRIAXone (Rocephin) 1 g in D5W 100 mL IVPB-ADD, 1 g, Intravenous, Q24H  No current outpatient medications on file.       Review of Systems:  Pt cannot provide details      Physical Exam:   /77 (BP Location: Left arm)   Pulse 75   Temp 97.5 °F (36.4 °C) (Oral)   Resp 16   Ht 5' 11\" (1.803 m)   Wt 190 lb 4.8 oz (86.3 kg)   SpO2 99%   BMI 26.54 kg/m²   Temp (24hrs), Av.9 °F (36.6 °C), Min:97.5 °F (36.4 °C), Max:98.4 °F (36.9 °C)       Intake/Output Summary (Last 24 hours) at 2024 1039  Last data filed at 2024 0824  Gross per 24 hour   Intake 100 ml   Output 0 ml   Net 100  ml     Last 3 Weights   02/16/24 0247 190 lb 4.8 oz (86.3 kg)   02/15/24 2234 192 lb 9.6 oz (87.4 kg)   02/15/24 2226 192 lb 9.6 oz (87.4 kg)   02/15/24 1349 184 lb (83.5 kg)   02/14/24 1624 186 lb (84.4 kg)   02/10/24 0401 186 lb 8.2 oz (84.6 kg)   02/09/24 1706 185 lb (83.9 kg)   02/09/24 1044 180 lb (81.6 kg)   02/01/24 0400 189 lb (85.7 kg)   01/31/24 1910 193 lb 12.8 oz (87.9 kg)   01/31/24 1047 185 lb (83.9 kg)   01/26/24 0952 186 lb 12.8 oz (84.7 kg)   01/09/24 1518 182 lb (82.6 kg)     General: Alert and in no apparent distress.  HEENT: No scleral icterus, MMM  Neck: Supple, no ROXANN or thyromegaly  Cardiac: Regular rate and rhythm, S1, S2 normal, no murmur or rub  Lungs: Clear without wheezes, rales, rhonchi.    Abdomen: Soft, tender at exit site. + bowel sounds  Extremities: Without clubbing, cyanosis or edema. R arm AVF with bruit/thrill  Neurologic:  moving all extremities  Skin: Warm and dry, no rashes      Laboratory Data:  Lab Results   Component Value Date    WBC 4.1 02/16/2024    HGB 9.9 02/16/2024    HCT 27.3 02/16/2024    .0 02/16/2024    CREATSERUM 11.00 02/16/2024    BUN 54 02/16/2024     02/16/2024    K 4.5 02/16/2024    CL 99 02/16/2024    CO2 29.0 02/16/2024    GLU 88 02/16/2024    CA 8.3 02/16/2024    ALB 3.1 02/15/2024    ALKPHO 217 02/15/2024    BILT 1.1 02/15/2024    TP 7.8 02/15/2024    AST 47 02/15/2024    ALT 16 02/15/2024    MG 2.8 02/16/2024       BUN (mg/dL)   Date Value   02/16/2024 54 (H)   02/15/2024 46 (H)   02/10/2024 44 (H)     Blood Urea Nitrogen (mg/dL)   Date Value   05/25/2021 40.0 (H)   10/19/2020 47.0 (H)   09/30/2020 68.0 (H)     Creatinine (mg/dL)   Date Value   02/16/2024 11.00 (H)   02/15/2024 10.20 (H)   02/10/2024 10.10 (H)   05/25/2021 2.03 (H)   10/19/2020 2.81 (H)   09/30/2020 3.10 (H)       Malb/Cre Calc Ratio   Date Value Ref Range Status   05/25/2021 311.8 (H) 0.0 - 29.0 ug/mg Final       Recent Labs   Lab 02/09/24  1047 02/10/24  0604  02/15/24  1748 02/16/24  0530   WBC 5.1 4.9 4.9 4.1   HGB 10.9* 10.1* 10.7* 9.9*   MCV 85.5 86.8 85.8 84.0   .0* 95.0* 128.0* 118.0*       Recent Labs   Lab 02/09/24  1047 02/10/24  0604 02/15/24  1748 02/15/24  1926 02/16/24  0530    137 132*  --  135*   K 4.1 4.7 5.9* 4.8 4.5   CL 96* 97* 100  --  99   CO2 34.0* 31.0 27.0  --  29.0   BUN 32* 44* 46*  --  54*   CREATSERUM 8.17* 10.10* 10.20*  --  11.00*   CA 8.9 8.2* 8.5  --  8.3*   MG  --  2.9*  --   --  2.8*   PHOS  --  5.5*  --   --   --    GLU 89 96 92  --  88       Recent Labs   Lab 02/10/24  0604 02/15/24  1748   ALT 9* 16   AST 18 47*   ALB 3.2* 3.1*       No results for input(s): \"PGLU\" in the last 168 hours.          Impression/Plan:    #1.  Dislodged Tenckhoff catheter- appreciate surgery eval.  Plan for removal today.  It would appear that CCPD will not be an option moving forward.  On abx- ceftriaxone    #2.  ESRD- HD to cont per usual TTHS schedule    #3.  Anemia- due to ESRD.  Cont ERVIN for goal hgb 10-11 gms    #4.  Heart transplant- on tacro    Thank you for allowing me to participate in the care of your patient. Please do not hesitate to call with any questions or concerns.       Miguel Hansen MD  2/16/2024  10:39 AM

## 2024-02-16 NOTE — CONSULTS
[facility]  Report of Consultation    Nader Campbell Patient Status:  Inpatient    8/10/1945 MRN [id number]   Location [facility] 3NW-A Attending Lowell Salmon MD   Hosp Day # 1 PCP Anoop Keys MD     Requesting Physician:  Dr. Simon    Reason for Consultation:  Dislodged PD catheter;  Pt admitted 2/15/2024  Consult requested and pt seen 2024    Chief Complaint:  Abdominal pain and bleeding    History of Present Illness:  Nader Campbell is a 78 year old male who presents to [facility] on/15/2024 with his wife due to increased discomfort from his PD catheter sites with associated bloody drainage.  The patient has dementia and is a poor historian.  His wife is not present and therefore history was gathered via chart review.    The patient is well-known to our service for placement of PD catheter by myself on 2024.  The patient was recently admitted to [facility] after sustaining a an unwitnessed fall with subsequent acknowledgment of dislodgment of his PD catheter.  At that time the patient was not having significant discomfort.  He was not agreeable to a bedside removal of the catheter and he was advised to follow-up in my office to schedule elective removal of the PD catheter in the operating room.    Yesterday the patient was brought to the emergency department by his wife after patient experiencing increased pain and discomfort from the catheter with associated bleeding over the last several days.  There is no report of fever or chills.  No nausea, vomiting, diarrhea, or constipation.  The patient underwent hemodialysis on 2024.    Upon presentation to the hospital, the patient was afebrile and hemodynamically stable.  Serology revealed no leukocytosis and no left shift.  WBC 4.9, hemoglobin 10.7, and platelets 128.  The patient has end-stage renal disease-BUN 46 and creatinine 10.2.  He was hyperkalemic at 5.9.  A CT of the abdomen pelvis revealed a peritoneal  dialysis catheter in the left anterior abdominal wall with surrounding inflammation with no identifiable abscess.    Patient has a past medical history of dementia, end-stage renal disease, hypertension, CHF status post heart transplant.  He does not take blood thinning medications.    History:  Past Medical History:   Diagnosis Date    Back problem     BPH (benign prostatic hyperplasia)     Cataract     CKD (chronic kidney disease)     Congestive heart disease (HCC)     before transplant in 2006    Dialysis patient ([facility]) 07/27/2023    HEMODIALYSIS TUES/THURS/SAT 3.5HRS    Esophageal reflux     Fistula     RIGHT ARM    Glaucoma     Gout     Hearing impairment     HA's x2, doesnt wear    Heart transplanted ([facility]) 2006 2006-@ [facility]    High blood pressure     High cholesterol     Hypercholesterolemia     Muscle weakness     uses a cane- balance issues    Obstructive sleep apnea syndrome 02/04/2009    Problems with swallowing     Renal disorder     CKD    Short-term memory loss     Shortness of breath     Sleep apnea     had UPPP did not need device after    Visual impairment     glasses     Past Surgical History:   Procedure Laterality Date    ARTHROSCOPY OF JOINT UNLISTED Right     rotator cuff repair    AV FISTULA REVISION, OPEN      incorrect info per wife    COLONOSCOPY      HEART TRANSPLANT  2006    [facility]     KNEE REPLACEMENT SURGERY Left 2018    x2    OTHER SURGICAL HISTORY  08/05/2021    Cysto Dr. Delgado    OTHER SURGICAL HISTORY  01/2024    PERITONEAL DIALYSIS CATHETER PLACEMENT    ROTATOR CUFF REPAIR      SINUS SURGERY        TONSILLECTOMY      TOTAL KNEE REPLACEMENT Left     2 on this leg    UPPER GI ENDOSCOPY,EXAM       Family History   Problem Relation Age of Onset    Heart Disorder Father         heart attack    Heart Disorder Mother         CHF      reports that he quit smoking about 14 years ago. His smoking use included cigarettes. He has never used smokeless tobacco. He reports current  alcohol use. He reports that he does not currently use drugs.    Allergies:  Allergies   Allergen Reactions    Amlodipine OTHER (SEE COMMENTS) and SWELLING     Leg swelling    Dalteparin HIVES and RASH     Fragmin      Lisinopril OTHER (SEE COMMENTS)     Hyperkalemia     Penicillins HIVES, RASH, SWELLING and SHORTNESS OF BREATH    Oxycodone OTHER (SEE COMMENTS)     Lost his taste and wt loss       Medications:  Medications Prior to Admission   Medication Sig    HYDROcodone-acetaminophen 5-325 MG Oral Tab Take 1 tablet by mouth every 4 (four) hours as needed.    lidocaine-menthol 4-1 % External Patch Place 2 patches onto the skin daily.    risperiDONE 0.5 MG Oral Tab Take 1.5 tablets (0.75 mg total) by mouth nightly. May also take 0.5 tablet (0.25 mg total) daily as needed (agitation).    senna-docusate 8.6-50 MG Oral Tab Take 1 tablet by mouth daily. (Patient taking differently: Take 1 tablet by mouth daily as needed for constipation.)    HYDROcodone-acetaminophen 5-325 MG Oral Tab Take 1-2 tablets by mouth every 6 (six) hours as needed for Pain.    memantine 10 MG Oral Tab Take 2 tablets (20 mg total) by mouth at bedtime.    tacrolimus 1 MG Oral Cap Take 1 capsule (1 mg total) by mouth daily. FOR HEART TRANSPLANT  PATIENT TAKES TACROLIMUS 1.5 MG IN THE AM AND 0.5 MG IN THE EVENING    sevelamer carbonate 800 MG Oral Tab Take 1 tablet (800 mg total) by mouth 3 (three) times daily with meals.    tacrolimus 0.5 MG Oral Cap Take 1 capsule (0.5 mg total) by mouth 2 (two) times daily. FOR HEART TRANSPLANT    hydrOXYzine 25 MG Oral Tab Take 1 tablet (25 mg total) by mouth every 8 (eight) hours as needed for Itching.    Pravastatin Sodium 20 MG Oral Tab Take 1 tablet (20 mg total) by mouth nightly.    allopurinol 100 MG Oral Tab Take 0.5 tablets (50 mg total) by mouth daily.    gabapentin 100 MG Oral Cap Take 1 capsule (100 mg total) by mouth Every Tuesday, Thursday, and Saturday.         Current Facility-Administered  Medications:     sodium chloride 0.9 % IV bolus 100 mL, 100 mL, Intravenous, Q30 Min PRN **AND** albumin human (Albumin) 25% injection 25 g, 25 g, Intravenous, PRN Dialysis    [START ON 2/17/2024] epoetin jevon (Epogen, Procrit) 22445 UNIT/ML injection 10,000 Units, 10,000 Units, Intravenous, Once in dialysis    allopurinol (Zyloprim) tab 50 mg, 50 mg, Oral, Daily    [START ON 2/17/2024] gabapentin (Neurontin) cap 100 mg, 100 mg, Oral, Q Tu, Th and Sa    memantine (Namenda) tab 20 mg, 20 mg, Oral, Nightly    pravastatin (Pravachol) tab 20 mg, 20 mg, Oral, Nightly    risperiDONE (RisperDAL) tab 0.5 mg, 0.5 mg, Oral, Nightly    sevelamer carbonate (Renvela) tab 800 mg, 800 mg, Oral, TID CC    tacrolimus (Prograf) cap 0.5 mg, 0.5 mg, Oral, BID    tacrolimus (Prograf) cap 1 mg, 1 mg, Oral, QAM    cefTRIAXone (Rocephin) 1 g in D5W 100 mL IVPB-ADD, 1 g, Intravenous, Q24H    Review of Systems:  Review of Systems   Unable to perform ROS  The patient has dementia and a complete review of systems was unable to be performed    Physical Exam:  /77 (BP Location: Left arm)   Pulse 75   Temp 97.5 °F (36.4 °C) (Oral)   Resp 16   Ht 71\"   Wt 190 lb 4.8 oz (86.3 kg)   SpO2 99%   BMI 26.54 kg/m²     General: Alert, no acute distress.  HEENT: Normocephalic, atraumatic. No scleral icterus.  Pulmonary: No respiratory distress, effort normal.   Abdomen: Non-distended, without tympany to percussion. Soft, tender to palpation at PD catheter with serosanguinous drainage. No rebound or guarding.  Extremities: No lower extremity edema. No clubbing or cyanosis.   Skin: Warm, dry. No jaundice.       Laboratory Data:  Recent Labs   Lab 02/10/24  0604 02/15/24  1748 02/16/24  0530   RBC 3.33* 3.59* 3.25*   HGB 10.1* 10.7* 9.9*   HCT 28.9* 30.8* 27.3*   MCV 86.8 85.8 84.0   MCH 30.3 29.8 30.5   MCHC 34.9 34.7 36.3   RDW 14.6 15.5 14.9   NEPRELIM 3.03 2.72  --    WBC 4.9 4.9 4.1   PLT 95.0* 128.0* 118.0*       Recent Labs   Lab  02/10/24  0604 02/15/24  1748 02/15/24  1926 02/16/24  0530   GLU 96 92  --  88   BUN 44* 46*  --  54*   CREATSERUM 10.10* 10.20*  --  11.00*   CA 8.2* 8.5  --  8.3*   ALB 3.2* 3.1*  --   --     132*  --  135*   K 4.7 5.9* 4.8 4.5   CL 97* 100  --  99   CO2 31.0 27.0  --  29.0   ALKPHO 185* 217*  --   --    AST 18 47*  --   --    ALT 9* 16  --   --    BILT 0.7 1.1  --   --    TP 7.0 7.8  --   --          No results for input(s): \"PTP\", \"INR\", \"PTT\" in the last 168 hours.      CT ABDOMEN+PELVIS(CPT=74176)    Result Date: 2/15/2024  CONCLUSION:   1. Peritoneal dialysis catheter along the left anterior abdominal wall where there is mild surrounding inflammatory change in fatty induration that could represent changes of cellulitis.  No drainable soft tissue abscess identified in this region by noncontrast exam.  2. Possible sludge in the gallbladder.  There is gallbladder wall thickening and mild inflammatory changes along the gallbladder fossa tracking into the cortez hepatis raising the possibility of acute cholecystitis in the appropriate clinical setting.  Clinical correlation recommended.  3. Small amount of ascites in the abdomen and pelvis.  Please see above for further details.  LOCATION:  [id number]   Dictated by (CST): Giu Harris MD on 2/15/2024 at 7:08 PM     Finalized by (CST): Gui Harris MD on 2/15/2024 at 7:14 PM          Bridget Faust PA-C  2/16/2024  10:50 AM    Is this a shared or split note between Advanced Practice Provider and Physician? Yes    Medical Decision Making         Impression:  Patient Active Problem List   Diagnosis    Glaucoma    CKD (chronic kidney disease) stage 4, GFR 15-29 ml/min (HCC)    Right hip pain    Thrombocytopenia (HCC)    Chest pain, rule out acute myocardial infarction    Azotemia    Dementia (HCC)    Immunosuppressant-induced pancytopenia  (HCC)    Benign hypertensive heart disease with heart failure (HCC)    Complication of heart transplant (HCC)    Pruritic  disorder    Sensorineural hearing loss    Obstructive sleep apnea syndrome    Mitral valve disorder    Mild cognitive impairment with memory loss    Impotence of organic origin    Long-term use of immunosuppressant medication    Immunosuppressed status (HCC)    Hyperlipidemia    History of heart transplant (HCC)    History of esophageal dilatation    Hearing decreased    Hallux valgus, acquired    GERD (gastroesophageal reflux disease)    Coronary atherosclerosis    CHF (congestive heart failure) ([facility])    Chronic gouty arthropathy    Personal history of colonic polyps    THADDEUS (acute kidney injury) ([facility])    Dementia with behavioral disturbance ([facility])    Depression    Calcineurin inhibitor causing toxicity in therapeutic use    Chronic cholecystitis    Heart transplant recipient (HCC)    Acute renal failure superimposed on chronic kidney disease, unspecified CKD stage, unspecified acute renal failure type    Muscle cramping    Dyspnea on exertion    Metabolic acidosis    Vertebral artery stenosis, unspecified laterality    Unsteady gait    Diplopia    ESRD on hemodialysis (HCC)    ESRD (end stage renal disease) ([facility])    Encephalopathy acute    Metabolic encephalopathy    Altered mental status    Primary hypertension    Anemia in ESRD (end-stage renal disease)  ([facility])    Hyperkalemia    Weakness    Anemia due to chronic kidney disease, on chronic dialysis ([facility])    ESRD on dialysis ([facility])    Syncope and collapse    ESRD (end stage renal disease) on dialysis ([facility])    PD catheter dysfunction ([facility])    Labile hypertension    Orthostatic hypotension    Abdominal wall cellulitis    Peritoneal dialysis catheter exit site infection, initial encounter (HCC)    Mechanical complication of peritoneal dialysis catheter, initial encounter (HCC)       Malpositioned PD catheter    TO OR for removal now  The risks, benefits, and alternatives of the procedure were explained to the patient spouse.  The risks explained include, but are not  limited to, bleeding, infection, recurrence, pain, wound complications, missed lesions, incorrect diagnosis, injury to adjacent structures, and the need for further therapeutic, diagnostic, or surgical intervention.  The patient voiced understanding, and after all questions were answered to the patient's satisfaction, the patient provided willing and informed consent to proceed.          Julio Cesar Reyes MD FACS  Trauma Medical Director, [facility] General Surgery    The 21st Century Cures Act makes medical notes like these available to patients in the interest of transparency. Please be advised this is a medical document. Medical documents are intended to carry relevant information, facts as evident, and the clinical opinion of the practitioner. The medical note is intended as peer to peer communication and may appear blunt or direct. It is written in medical language and may contain abbreviations or verbiage that are unfamiliar.    This note was prepared using Dragon Medical voice recognition dictation software. As a result, errors may occur. When identified, these errors have been corrected. While every attempt is made to correct errors during dictation, discrepancies may still exist.

## 2024-02-16 NOTE — PLAN OF CARE
Patient admitted via cart from ER  Oriented to room.   Safety precautions initiated.   Bed in low position.  Call light in reach.

## 2024-02-16 NOTE — PROGRESS NOTES
[facility] Dosing Service      Initial Pharmacokinetic Consult for Vancomycin Dosing     Nader Campbell is a 78 year old male who is being initiated on vancomycin therapy for cellulitis.  Pharmacy has been asked to dose vancomycin by Dr. Simmons.  The initial treatment and monitoring approach will be non-AUC strategy.        Weight and Temperature:    Wt Readings from Last 1 Encounters:   24 86.3 kg (190 lb 4.8 oz)        Temp Readings from Last 1 Encounters:   24 98 °F (36.7 °C) (Oral)      Labs:   Recent Labs   Lab 02/10/24  0604 02/15/24  1748 24  0530   CREATSERUM 10.10* 10.20* 11.00*      Estimated Creatinine Clearance: 6.1 mL/min (A) (based on SCr of 11 mg/dL (H)).     Recent Labs   Lab 02/10/24  0604 02/15/24  1748 24  0530   WBC 4.9 4.9 4.1          The Pharmacokinetic Target is:    Trough/random 15-20 mg/L (applies to IV dosing in HD and IP dosing in APD)    Renal Dosing Considerations:    HD: Current regimen: Tuesday,  (of note, pt admitted on peritoneal dialysis but has been receiving hemodialysis while admitted so far)     Assessment/Plan:   Initial/Loading dose: Will receive 2250 mg IV (25 mg/kg, capped at 2250 mg) x 1 loading dose.      Maintenance dose: Pharmacy will dose vancomycin per levels    Monitorin) Plan for vancomycin random level to be obtained prior to the 4th HD session    2) Pharmacy will order SCr as clinically indicated to assess renal function.    3) Pharmacy will monitor for toxicity and efficacy, adjust vancomycin dose and/or frequency, and order vancomycin levels as appropriate per the Pharmacy and Therapeutics Committee approved protocol until discontinuation of the medication.       We appreciate the opportunity to assist in the care of this patient.     Donald Duarte, PharmD  2024  3:20 PM  [facility]  Pharmacy Extension: [phone]

## 2024-02-16 NOTE — ED QUICK NOTES
Orders for admission, patient is aware of plan and ready to go upstairs. Any questions, please call ED RN Alysha at extension [phone].     Patient Covid vaccination status: Fully vaccinated     COVID Test Ordered in ED: None    COVID Suspicion at Admission: N/A    Running Infusions:      Mental Status/LOC at time of transport: Aox1 per baseline, hx of dementia.  Patient mumbles    Other pertinent information: Unsteady gait.  Can walk with assistance, has shuffling gate.  Per wife, does make urine.  CIWA score: N/A   NIH score:  N/A

## 2024-02-16 NOTE — HOME CARE LIAISON
Patient is current with [facility] for RN Pt and OT services. Updated CM Brennon will need MIKHAIL AT [facility]. Updated AVS with contact information

## 2024-02-16 NOTE — CONSULTS
[facility]     [facility] Consult    Nader Campbell Patient Status:  Inpatient    8/10/1945 MRN [id number]   Location [facility] 3NW-A Attending Lowell Salmon MD   Hosp Day # 1 PCP Anoop Keys MD     Reason for Consultation:  To help with infection and treatment, requested by Dr. Salmon,     History of Present Illness:  Nader Campbell is a 78 year old male admitted to [facility] on 2/15 with Pain and bleeding at PD Cathter site,   Significant hx of   - Dementia,   - ESRD, has been on HD, Plan is to transition to PD.   - Heart Transplant,   Had PD Catheter inserted a month ago, which apparently got dislodged with plan for removal in next few weeks, As the pain at the site of cathter worsened, he also had bleeding from the site, infection was suspected and Nader was instructed to come to the hospital,   Nephrology on board, ID is asked to help with infection and treatment,   Patient has his eyes closed, does not answer questions,     History:  Past Medical History:   Diagnosis Date    Back problem     BPH (benign prostatic hyperplasia)     Cataract     CKD (chronic kidney disease)     Congestive heart disease (HCC)     before transplant in     Dialysis patient ([facility]) 2023    HEMODIALYSIS TUES/THURS/SAT 3.5HRS    Esophageal reflux     Fistula     RIGHT ARM    Glaucoma     Gout     Hearing impairment     HA's x2, doesnt wear    Heart transplanted ([facility]) 2006-@ [facility]    High blood pressure     High cholesterol     Hypercholesterolemia     Muscle weakness     uses a cane- balance issues    Obstructive sleep apnea syndrome 2009    Problems with swallowing     Renal disorder     CKD    Short-term memory loss     Shortness of breath     Sleep apnea     had UPPP did not need device after    Visual impairment     glasses     Past Surgical History:   Procedure Laterality Date    ARTHROSCOPY OF JOINT UNLISTED Right     rotator cuff repair    AV FISTULA REVISION,  OPEN      incorrect info per wife    COLONOSCOPY      [facility] TR[facility]PLANT  2006    [facility]     KNEE REPLACEMENT SURGERY Left 2018    x2    OTHER SURGICAL HISTORY  08/05/2021    Cysto Dr. Delgado    OTHER SURGICAL HISTORY  01/2024    PERITONEAL DIALYSIS CATHETER PLACEMENT    ROTATOR CUFF REPAIR      SINUS SURGERY        TONSILLECTOMY      TOTAL KNEE REPLACEMENT Left     2 on this leg    UPPER GI ENDOSCOPY,EXAM       Family History   Problem Relation Age of Onset    Heart Disorder Father         heart attack    Heart Disorder Mother         CHF      reports that he quit smoking about 14 years ago. His smoking use included cigarettes. He has never used smokeless tobacco. He reports current alcohol use. He reports that he does not currently use drugs.    Allergies:  Allergies   Allergen Reactions    Amlodipine OTHER (SEE COMMENTS) and SWELLING     Leg swelling    Dalteparin HIVES and RASH     Fragmin      Lisinopril OTHER (SEE COMMENTS)     Hyperkalemia     Penicillins HIVES, RASH, SWELLING and SHORTNESS OF BREATH    Oxycodone OTHER (SEE COMMENTS)     Lost his taste and wt loss       Medications:    Current Facility-Administered Medications:     sodium chloride 0.9 % IV bolus 100 mL, 100 mL, Intravenous, Q30 Min PRN **AND** albumin human (Albumin) 25% injection 25 g, 25 g, Intravenous, PRN Dialysis    [START ON 2/17/2024] epoetin jevon (Epogen, Procrit) 21717 UNIT/ML injection 10,000 Units, 10,000 Units, Intravenous, Once in dialysis    allopurinol (Zyloprim) tab 50 mg, 50 mg, Oral, Daily    [START ON 2/17/2024] gabapentin (Neurontin) cap 100 mg, 100 mg, Oral, Q Tu, Th and Sa    memantine (Namenda) tab 20 mg, 20 mg, Oral, Nightly    pravastatin (Pravachol) tab 20 mg, 20 mg, Oral, Nightly    risperiDONE (RisperDAL) tab 0.5 mg, 0.5 mg, Oral, Nightly    sevelamer carbonate (Renvela) tab 800 mg, 800 mg, Oral, TID CC    tacrolimus (Prograf) cap 0.5 mg, 0.5 mg, Oral, BID    tacrolimus (Prograf) cap 1 mg, 1 mg, Oral, QAM     cefTRIAXone (Rocephin) 1 g in D5W 100 mL IVPB-ADD, 1 g, Intravenous, Q24H    Review of Systems:   Constitutional: Negative for anorexia, chills, fatigue, fevers, malaise, night sweats and weight loss.  Eyes: Negative for visual disturbance, irritation and redness.  Ears, nose, mouth, throat, and face: Negative for hearing loss, tinnitus, nasal congestion, snoring, sore throat, hoarseness and voice change.  Respiratory: Negative for cough, sputum, hemoptysis, chest pain, wheezing, dyspnea on exertion, or stridor.  Cardiovascular: Negative for chest pain, palpitations, irregular heart beats, syncope, fatigue, orthopnea, paroxysmal nocturnal dyspnea, lower extremity edema.  Gastrointestinal: Negative for dysphagia, odynophagia, reflux symptoms, nausea, vomiting, change in bowel habits, diarrhea, constipation and abdominal pain.  Integument/breast: Negative for rash, skin lesions, and pruritus.  Hematologic/lymphatic: Negative for easy bruising, bleeding, and lymphadenopathy.  Musculoskeletal: Negative for myalgias, arthralgias, muscle weakness.  Neurological: Negative for headaches, dizziness, seizures, memory problems, trouble swallowing, speech problems, gait problems and weakness.  Behavioral/Psych: Negative for active tobacco use.  Endocrine: No history of of diabetes, thyroid disorder.  Unable to obtain,     Vital signs in last 24 hours:  Patient Vitals for the past 24 hrs:   BP Temp Temp src Pulse Resp SpO2 Height Weight   02/16/24 1106 100/64 97.6 °F (36.4 °C) Oral 76 16 94 % -- --   02/16/24 0824 119/77 97.5 °F (36.4 °C) Oral 75 16 99 % -- --   02/16/24 0316 (!) 129/93 97.8 °F (36.6 °C) Oral 92 16 96 % -- --   02/16/24 0247 -- -- -- -- -- -- -- 190 lb 4.8 oz (86.3 kg)   02/15/24 2234 -- -- -- -- -- -- -- 192 lb 9.6 oz (87.4 kg)   02/15/24 2226 125/84 97.7 °F (36.5 °C) Oral 88 16 97 % 5' 11\" (1.803 m) 192 lb 9.6 oz (87.4 kg)   02/15/24 2130 119/76 -- -- 84 14 -- -- --   02/15/24 2030 110/79 -- -- 76 14 -- -- --    02/15/24 2029 110/79 -- -- 77 18 -- -- --   02/15/24 1845 91/66 -- -- 75 15 -- -- --   02/15/24 1745 115/81 -- -- 83 11 94 % -- --   02/15/24 1349 120/71 98.4 °F (36.9 °C) Temporal 81 16 96 % 5' 11\" (1.803 m) 184 lb (83.5 kg)       Physical Exam:   General: alert, cooperative, oriented.  No respiratory distress.   Head: Normocephalic, without obvious abnormality, atraumatic.   Eyes: Conjunctivae/corneas clear.  No scleral icterus.  No conjunctival     hemorrhage.   Nose: Nares normal.   Throat: Lips, mucosa, and tongue normal.  No thrush noted.   Neck: Soft, supple neck; trachea midline, no adenopathy, no thyromegaly.   Lungs: CTAB, normal and equal bilateral chest rise   Chest wall: No tenderness or deformity.   Heart: Regular rate and rhythm, normal S1S2, no murmur.   Abdomen: soft, non-distended,BS+ tenderness & bleeding from the site, no     rebound, positive BS.   Extremity: no edema, no cyanosis   Skin: No rashes or lesions.   Neurological: Alert, interactive, no focal deficits    Labs:  Lab Results   Component Value Date    WBC 4.1 02/16/2024    HGB 9.9 02/16/2024    HCT 27.3 02/16/2024    .0 02/16/2024    CREATSERUM 11.00 02/16/2024    BUN 54 02/16/2024     02/16/2024    K 4.5 02/16/2024    CL 99 02/16/2024    CO2 29.0 02/16/2024    GLU 88 02/16/2024    CA 8.3 02/16/2024    ALB 3.1 02/15/2024    ALKPHO 217 02/15/2024    BILT 1.1 02/15/2024    TP 7.8 02/15/2024    AST 47 02/15/2024    ALT 16 02/15/2024    MG 2.8 02/16/2024       Radiology:  CT- 1. Peritoneal dialysis catheter along the left anterior abdominal wall where there is mild surrounding inflammatory change in fatty induration that could represent changes of cellulitis.  No drainable soft tissue abscess identified in this region by   noncontrast exam.      2. Possible sludge in the gallbladder.  There is gallbladder wall thickening and mild inflammatory changes along the gallbladder fossa tracking into the cortez hepatis raising the  possibility of acute cholecystitis in the appropriate clinical setting.    Clinical correlation recommended.      3. Small amount of ascites in the abdomen and pelvis.      Please see above for further details.       Cultures:  Reviewed     Assessment and Plan:    1.  Anterior abdominal wall cellulitis around the PD Catheter:   - No drainage noted, bleeding is there,   - CT scan with mild surrounding inflammatory change in fatty induration that could represent changes of cellulitis around PD Catheter,   - Surgery input for the removal of catheter,     2.   Gall bladder wall thickening with mild inflammatory changes: No abn LFTs,  - No tenderness on my exam, will follow surgery input,     3.   ESRD: On HD now,   - Follow Blood cul,     4.   Immunocompromised state: sec to ESRD, Heart Transplant,   - On Tacrolimus,     5.   Disposition: in house, an elderly male with dementia, admitted with worsening abdominal pain, sec to infected dislodged PD Cathter, rule out biliary source in association,   - Follow Pending cul,   - Continue IV Ceftriaxone, will add IV Vanc,   - Drainage Cul if any,   - Pain management per PCP,     Discussed with RN, all questions answered, further recommendations to follow, Thanks,         Thank you for consulting [facility] for Nader Campbell.  If you have any questions or concerns please call [facility] Infectious Disease at [phone].     Nghia Simmons MD  2/16/2024  12:44 PM

## 2024-02-16 NOTE — H&P
[facility]    History and Physical     Nader Campbell Patient Status:  Inpatient    8/10/1945 MRN [id number]   Location [facility] 3NW-A Attending Lowell Salmon MD   Hosp Day # 1 PCP Anoop Keys MD     Chief Complaint: PD catheter Pain     History of Present Illness: Nader Campbell is a 78 year old male with history of BPH, CKD, congestive heart failure with history of heart transplant, hemodialysis, GERD, glaucoma, gout, hypertension, hyperlipidemia, obstructive sleep apnea presenting with PD catheter pain.  Appears the patient had a PD catheter placed a month ago to transition from hemodialysis to peritoneal dialysis.  Appears that the catheter has been out of place.  Has not been removed yet.  There is plan to have it removed in the next few weeks as an outpatient.  Appears that patient at home has been having more pain at the catheter site and there is concern for infection result patient was brought to the emergency room for further evaluation and treatment.  Patient has dementia and is unable to give me a significant history or review of systems.  History is obtained from review of the chart discussion with patient and family.    Past Medical History:  Past Medical History:   Diagnosis Date    Back problem     BPH (benign prostatic hyperplasia)     Cataract     CKD (chronic kidney disease)     Congestive heart disease (HCC)     before transplant in     D[facility] patient ([facility]) 2023    HEMODIALYSIS TUES/THURS/SAT 3.5HRS    Esophageal reflux     Fistula     RIGHT ARM    Glaucoma     Gout     Hearing impairment     HA's x2, doesnt wear    Heart transplanted ([facility]) 2006-@ [facility]    High blood pressure     High cholesterol     Hypercholesterolemia     Muscle weakness     uses a cane- balance issues    Obstructive sleep apnea syndrome 2009    Problems with swallowing     Renal disorder     CKD    Short-term memory loss     Shortness of breath     Sleep apnea      had UPPP did not need device after    Visual impairment     glasses        Past Surgical History:   Past Surgical History:   Procedure Laterality Date    ARTHROSCOPY OF JOINT UNLISTED Right     rotator cuff repair    AV FISTULA REVISION, OPEN      incorrect info per wife    COLONOSCOPY      HEART TRANSPLANT  2006    [facility]     KNEE REPLACEMENT SURGERY Left 2018    x2    OTHER SURGICAL HISTORY  08/05/2021    Cysto Dr. Delgado    OTHER SURGICAL HISTORY  01/2024    PERITONEAL DIALYSIS CATHETER PLACEMENT    ROTATOR CUFF REPAIR      SINUS SURGERY        TONSILLECTOMY      TOTAL KNEE REPLACEMENT Left     2 on this leg    UPPER GI ENDOSCOPY,EXAM         Social History:  reports that he quit smoking about 14 years ago. His smoking use included cigarettes. He has never used smokeless tobacco. He reports current alcohol use. He reports that he does not currently use drugs.no illegal drugs, , 2 children, use a walker, live with wife    Family History:   Family History   Problem Relation Age of Onset    Heart Disorder Father         heart attack    Heart Disorder Mother         CHF   Mother passed  Father passed    Allergies:   Allergies   Allergen Reactions    Amlodipine OTHER (SEE COMMENTS) and SWELLING     Leg swelling    Dalteparin HIVES and RASH     Fragmin      Lisinopril OTHER (SEE COMMENTS)     Hyperkalemia     Penicillins HIVES, RASH, SWELLING and SHORTNESS OF BREATH    Oxycodone OTHER (SEE COMMENTS)     Lost his taste and wt loss       Medications:    Current Facility-Administered Medications on File Prior to Encounter   Medication Dose Route Frequency Provider Last Rate Last Admin    [COMPLETED] HYDROmorphone (Dilaudid) 1 MG/ML injection 0.5 mg  0.5 mg Intravenous Once Casper Lee DO   0.5 mg at 02/09/24 1116    [COMPLETED] epoetin jevon (Epogen, Procrit) 95299 UNIT/ML injection 10,000 Units  10,000 Units Intravenous Once in dialysis Antonio Taylor MD   10,000 Units at 02/10/24 1739    [COMPLETED]  epoetin jevon (Epogen, Procrit) 50632 UNIT/ML injection 10,000 Units  10,000 Units Intravenous Once in dialysis Antonio Taylor MD   10,000 Units at 24 1654    [COMPLETED] QUEtiapine (SEROquel) tab 25 mg  25 mg Oral Once Lowell Salmon MD   25 mg at 24 0000    [COMPLETED] albuterol (Ventolin) (5 MG/ML) 0.5% nebulizer solution 10 mg  10 mg Nebulization Once Mil Lynn MD   10 mg at 24 1346    [COMPLETED] sodium bicarbonate injection 50 mEq  50 mEq Intravenous Once Mil Lynn MD   50 mEq at 24 1406    [COMPLETED] calcium gluconate 1g in 100mL iso-NaCl IVPB premix  1 g Intravenous Once Mil Lynn MD   Stopped at 24 1510    [COMPLETED] insulin regular human (Novolin R, Humulin R) 100 UNIT/ML injection 5 Units  5 Units Intravenous Once Mil Lynn MD   5 Units at 24 1406    [COMPLETED] dextrose 50% injection  mL   mL Intravenous Once Mil Lynn MD   50 mL at 24 1406    [] dextrose 50% injection 25 mL  25 mL Intravenous Q1H PRN Mil Lynn MD        [COMPLETED] epoetin jevon (Epogen, Procrit) 78126 UNIT/ML injection 10,000 Units  10,000 Units Intravenous Once Samaria Shepherd MD   10,000 Units at 24 0041    [COMPLETED] ceFAZolin (Ancef) 2 g in 20mL IV syringe premix  2 g Intravenous Once Julio Cesar Reyes MD   2 g at 24 1217    [COMPLETED] LORazepam (Ativan) tab 0.5 mg  0.5 mg Oral Once Tyrel Pruett MD   0.5 mg at 24 1413     Current Outpatient Medications on File Prior to Encounter   Medication Sig Dispense Refill    HYDROcodone-acetaminophen 5-325 MG Oral Tab Take 1 tablet by mouth every 4 (four) hours as needed. 5 tablet 0    lidocaine-menthol 4-1 % External Patch Place 2 patches onto the skin daily. 30 patch 0    risperiDONE 0.5 MG Oral Tab Take 1.5 tablets (0.75 mg total) by mouth nightly. May also take 0.5 tablet (0.25 mg total) daily as needed (agitation). 60 tablet 11    senna-docusate 8.6-50 MG Oral Tab Take 1  tablet by mouth daily. (Patient taking differently: Take 1 tablet by mouth daily as needed for constipation.) 30 tablet 0    HYDROcodone-acetaminophen 5-325 MG Oral Tab Take 1-2 tablets by mouth every 6 (six) hours as needed for Pain. 10 tablet 0    memantine 10 MG Oral Tab Take 2 tablets (20 mg total) by mouth at bedtime.      tacrolimus 1 MG Oral Cap Take 1 capsule (1 mg total) by mouth daily. FOR HEART TRANSPLANT  PATIENT TAKES TACROLIMUS 1.5 MG IN THE AM AND 0.5 MG IN THE EVENING      sevelamer carbonate 800 MG Oral Tab Take 1 tablet (800 mg total) by mouth 3 (three) times daily with meals.      tacrolimus 0.5 MG Oral Cap Take 1 capsule (0.5 mg total) by mouth 2 (two) times daily. FOR HEART TRANSPLANT  0    hydrOXYzine 25 MG Oral Tab Take 1 tablet (25 mg total) by mouth every 8 (eight) hours as needed for Itching. 90 tablet 3    Pravastatin Sodium 20 MG Oral Tab Take 1 tablet (20 mg total) by mouth nightly.      allopurinol 100 MG Oral Tab Take 0.5 tablets (50 mg total) by mouth daily.      gabapentin 100 MG Oral Cap Take 1 capsule (100 mg total) by mouth Every Tuesday, Thursday, and Saturday.         Review of Systems:   A comprehensive 14 point review of systems was completed.    Pertinent positives and negatives noted in the HPI.    Physical Exam:    BP (!) 129/93 (BP Location: Left arm)   Pulse 92   Temp 97.8 °F (36.6 °C) (Oral)   Resp 16   Ht 5' 11\" (1.803 m)   Wt 190 lb 4.8 oz (86.3 kg)   SpO2 96%   BMI 26.54 kg/m²   General: No acute distress. Alert to name and type of place but not name of place  HEENT: Normocephalic atraumatic. Moist mucous membranes. EOM-I.  Neck: No JVD. No carotid bruits.  Respiratory: Clear to auscultation bilaterally. No wheezes. No crackles  Cardiovascular: S1, S2. Regular rate and rhythm. No murmurs  Chest and Back: No tenderness or deformity.  Abdomen: Soft, tender around the PD catheter site, nondistended.  Positive bowel sounds. No rebound, guarding  Neurologic: No  focal neurological deficits. CNII-XII grossly intact. Sensation and strength intact  Musculoskeletal: Moves all extremities.  Extremities: No edema or tenderness of the LE  Integument: No new rashes or lesions.   Psychiatric: Appropriate mood and affect.      Diagnostic Data:      Labs:  Recent Labs   Lab 02/09/24  1047 02/10/24  0604 02/15/24  1748 02/16/24  0530   WBC 5.1 4.9 4.9 4.1   HGB 10.9* 10.1* 10.7* 9.9*   MCV 85.5 86.8 85.8 84.0   .0* 95.0* 128.0* 118.0*       Recent Labs   Lab 02/10/24  0604 02/15/24  1748 02/15/24  1926 02/16/24  0530   GLU 96 92  --  88   BUN 44* 46*  --  54*   CREATSERUM 10.10* 10.20*  --  11.00*   CA 8.2* 8.5  --  8.3*   ALB 3.2* 3.1*  --   --     132*  --  135*   K 4.7 5.9* 4.8 4.5   CL 97* 100  --  99   CO2 31.0 27.0  --  29.0   ALKPHO 185* 217*  --   --    AST 18 47*  --   --    ALT 9* 16  --   --    BILT 0.7 1.1  --   --    TP 7.0 7.8  --   --        Estimated Creatinine Clearance: 6.1 mL/min (A) (based on SCr of 11 mg/dL (H)).    No results for input(s): \"PTP\", \"INR\" in the last 168 hours.    No results for input(s): \"TROP\", \"CK\" in the last 168 hours.    Imaging: Imaging data reviewed in Epic.      ASSESSMENT / PLAN:   78 year old male with history of BPH, CKD, congestive heart failure with history of heart transplant, hemodialysis, GERD, glaucoma, gout, hypertension, hyperlipidemia, obstructive sleep apnea presenting with PD catheter pain.    PD catheter pain  -surgery consulted  -plan for OR today for removal of PD catheter    Cellulitis  -concern for cellulitis at PD catheter site  -IV ceftriaxone  -ID consulted    ESRD  -HD per renal consulted    Dementia  -namenda  -risperidone    HLD  -pravastatin     Hx Heart transplant  -tacrolimus     Gout  -allopurinol     Quality:  DVT Prophylaxis: scd  CODE status: DNR comfort per chart  Pozo:     Plan of care discussed with patient and staff    Dispo: no discharge    Lowell Salmon MD  [facility]  [facility]  [phone]

## 2024-02-16 NOTE — PLAN OF CARE
Patient resting in bed. VSS. Alert to person. BA in place, call light within reach. PDC to LLQ clamped. AV fistula present in right upper arm. NPO with sips. Frequent rounds. .

## 2024-02-17 LAB
ANION GAP SERPL CALC-SCNC: 10 MMOL/L (ref 0–18)
BASOPHILS # BLD AUTO: 0.02 X10(3) UL (ref 0–0.2)
BASOPHILS NFR BLD AUTO: 0.5 %
BUN BLD-MCNC: 65 MG/DL (ref 9–23)
CALCIUM BLD-MCNC: 8.8 MG/DL (ref 8.5–10.1)
CHLORIDE SERPL-SCNC: 98 MMOL/L (ref 98–112)
CO2 SERPL-SCNC: 26 MMOL/L (ref 21–32)
CREAT BLD-MCNC: 12.7 MG/DL
EGFRCR SERPLBLD CKD-EPI 2021: 4 ML/MIN/1.73M2 (ref 60–?)
EOSINOPHIL # BLD AUTO: 0.27 X10(3) UL (ref 0–0.7)
EOSINOPHIL NFR BLD AUTO: 6.4 %
ERYTHROCYTE [DISTWIDTH] IN BLOOD BY AUTOMATED COUNT: 15.1 %
GLUCOSE BLD-MCNC: 104 MG/DL (ref 70–99)
HCT VFR BLD AUTO: 28.2 %
HGB BLD-MCNC: 10.3 G/DL
IMM GRANULOCYTES # BLD AUTO: 0.01 X10(3) UL (ref 0–1)
IMM GRANULOCYTES NFR BLD: 0.2 %
LYMPHOCYTES # BLD AUTO: 0.95 X10(3) UL (ref 1–4)
LYMPHOCYTES NFR BLD AUTO: 22.4 %
MAGNESIUM SERPL-MCNC: 2.9 MG/DL (ref 1.6–2.6)
MCH RBC QN AUTO: 30.8 PG (ref 26–34)
MCHC RBC AUTO-ENTMCNC: 36.5 G/DL (ref 31–37)
MCV RBC AUTO: 84.4 FL
MONOCYTES # BLD AUTO: 0.59 X10(3) UL (ref 0.1–1)
MONOCYTES NFR BLD AUTO: 13.9 %
NEUTROPHILS # BLD AUTO: 2.41 X10 (3) UL (ref 1.5–7.7)
NEUTROPHILS # BLD AUTO: 2.41 X10(3) UL (ref 1.5–7.7)
NEUTROPHILS NFR BLD AUTO: 56.6 %
OSMOLALITY SERPL CALC.SUM OF ELEC: 297 MOSM/KG (ref 275–295)
PLATELET # BLD AUTO: 135 10(3)UL (ref 150–450)
POTASSIUM SERPL-SCNC: 4.9 MMOL/L (ref 3.5–5.1)
RBC # BLD AUTO: 3.34 X10(6)UL
SODIUM SERPL-SCNC: 134 MMOL/L (ref 136–145)
WBC # BLD AUTO: 4.3 X10(3) UL (ref 4–11)

## 2024-02-17 PROCEDURE — 5A1D70Z PERFORMANCE OF URINARY FILTRATION, INTERMITTENT, LESS THAN 6 HOURS PER DAY: ICD-10-PCS

## 2024-02-17 PROCEDURE — 90935 HEMODIALYSIS ONE EVALUATION: CPT | Performed by: INTERNAL MEDICINE

## 2024-02-17 RX ORDER — RISPERIDONE 0.25 MG/1
0.25 TABLET ORAL DAILY PRN
Status: DISCONTINUED | OUTPATIENT
Start: 2024-02-17 | End: 2024-02-19

## 2024-02-17 RX ORDER — HALOPERIDOL 5 MG/ML
5 INJECTION INTRAMUSCULAR ONCE
Status: COMPLETED | OUTPATIENT
Start: 2024-02-17 | End: 2024-02-17

## 2024-02-17 NOTE — PROGRESS NOTES
[facility]  Progress Note    Nader Campbell Patient Status:  Inpatient    8/10/1945 MRN [id number]   Location [facility] 3NW-A Attending Lowell Salmon MD   Hosp Day # 2 PCP Anoop Keys MD     Seen during HD  Restrained   Pt is frustrated about being in restraints  - care reveiwed w/ RN     Medications:    Current Facility-Administered Medications:     sodium chloride 0.9 % IV bolus 100 mL, 100 mL, Intravenous, Q30 Min PRN **AND** albumin human (Albumin) 25% injection 25 g, 25 g, Intravenous, PRN Dialysis    epoetin jevon (Epogen, Procrit) 72591 UNIT/ML injection 10,000 Units, 10,000 Units, Intravenous, Once in dialysis    Vancomycin: PHARMACY DOSING, 1 each, Intravenous, See Admin Instructions (RX holding)    acetaminophen (Tylenol) tab 650 mg, 650 mg, Oral, Q6H PRN    ibuprofen (Motrin) tab 400 mg, 400 mg, Oral, Q6H PRN    allopurinol (Zyloprim) tab 50 mg, 50 mg, Oral, Daily    gabapentin (Neurontin) cap 100 mg, 100 mg, Oral, Q , Th and Sa    memantine (Namenda) tab 20 mg, 20 mg, Oral, Nightly    pravastatin (Pravachol) tab 20 mg, 20 mg, Oral, Nightly    risperiDONE (RisperDAL) tab 0.5 mg, 0.5 mg, Oral, Nightly    sevelamer carbonate (Renvela) tab 800 mg, 800 mg, Oral, TID CC    tacrolimus (Prograf) cap 0.5 mg, 0.5 mg, Oral, BID    tacrolimus (Prograf) cap 1 mg, 1 mg, Oral, QAM    cefTRIAXone (Rocephin) 1 g in D5W 100 mL IVPB-ADD, 1 g, Intravenous, Q24H  No current outpatient medications on file.       Review of Systems:  Pt cannot provide details      Physical Exam:   /83 (BP Location: Left leg)   Pulse 82   Temp 97.8 °F (36.6 °C) (Oral)   Resp 16   Ht 5' 11\" (1.803 m)   Wt 190 lb 4.8 oz (86.3 kg)   SpO2 97%   BMI 26.54 kg/m²   Temp (24hrs), Av.8 °F (36.6 °C), Min:97.6 °F (36.4 °C), Max:98 °F (36.7 °C)       Intake/Output Summary (Last 24 hours) at 2024 1008  Last data filed at 2024 0848  Gross per 24 hour   Intake 590 ml   Output 2 ml   Net 588 ml     Last 3 Weights    02/16/24 0247 190 lb 4.8 oz (86.3 kg)   02/15/24 2234 192 lb 9.6 oz (87.4 kg)   02/15/24 2226 192 lb 9.6 oz (87.4 kg)   02/15/24 1349 184 lb (83.5 kg)   02/14/24 1624 186 lb (84.4 kg)   02/10/24 0401 186 lb 8.2 oz (84.6 kg)   02/09/24 1706 185 lb (83.9 kg)   02/09/24 1044 180 lb (81.6 kg)   02/01/24 0400 189 lb (85.7 kg)   01/31/24 1910 193 lb 12.8 oz (87.9 kg)   01/31/24 1047 185 lb (83.9 kg)   01/26/24 0952 186 lb 12.8 oz (84.7 kg)   01/09/24 1518 182 lb (82.6 kg)     General: awake/alert   HEENT: No scleral icterus, MMM  Neck: Supple, no ROXANN or thyromegaly  Cardiac: Regular rate and rhythm, S1, S2 normal, no murmur or rub  Lungs: Clear without wheezes, rales, rhonchi.    Abdomen: Soft, tender at exit site. + bowel sounds  Extremities: Without clubbing, cyanosis or edema. R arm AVF with bruit/thrill- needles in place   Neurologic:  moving all extremities  Skin: Warm and dry, no rashes    Recent Labs     02/15/24  1748 02/16/24  0530 02/17/24  0644   WBC 4.9 4.1 4.3   HGB 10.7* 9.9* 10.3*   MCV 85.8 84.0 84.4   .0* 118.0* 135.0*       Recent Labs     02/15/24  1748 02/15/24  1926 02/16/24  0530 02/17/24  0644   *  --  135* 134*   K 5.9* 4.8 4.5 4.9     --  99 98   CO2 27.0  --  29.0 26.0   BUN 46*  --  54* 65*   CREATSERUM 10.20*  --  11.00* 12.70*   CA 8.5  --  8.3* 8.8   MG  --   --  2.8* 2.9*       Recent Labs     02/15/24  1748   ALT 16   AST 47*   ALB 3.1*           Impression/Plan:    1.  Dislodged Tenckhoff catheter- appreciate surgery eval.  S/p Removal. HE is not a candidate for CCPD moving forward due to dementia.    2.  ESRD- HD to cont per usual TTHS schedule- tx today     3.  Anemia- due to ESRD.  Cont ERVIN for goal hgb 10-11 gms    4.  Heart transplant- on tacro    Thank you for allowing me to participate in the care of your patient. Please do not hesitate to call with any questions or concerns.       Antonio Taylor  2/17/2024

## 2024-02-17 NOTE — BRIEF OP NOTE
Pre-Operative Diagnosis:Malpositioned peritoneal dialysis catheter      Post-Operative Diagnosis: Same    Procedure Performed:   REMOVAL OF  PERITONEAL DIALYSIS CATHETER    Surgeon(s) and Role:     * Julio Cesar Reyes MD - Primary    Assistant(s):        Surgical Findings: Catheter removed intact     Specimen: Peritoneal dialysis catheter for gross identification only     Estimated Blood Loss: No data recorded    Dictation Number:  [id number]    Julio Cesar Reyes MD  2/16/2024  7:42 PM

## 2024-02-17 NOTE — OPERATIVE REPORT
Detail Level: Simple Dictation Number:  [id number]   Detail Level: Zone Detail Level: Generalized Include Location In Plan?: Yes

## 2024-02-17 NOTE — ANESTHESIA PREPROCEDURE EVALUATION
PRE-OP EVALUATION    Patient Name: Nader Campbell    Admit Diagnosis: ESRD (end stage renal disease) on dialysis ([facility]) [N18.6, Z99.2]  Abdominal wall cellulitis [L03.311]  Peritoneal dialysis catheter exit site infection, initial encounter ([facility]) [T85.71XA]  Mechanical complication of peritoneal dialysis catheter, initial encounter ([facility]) [T85.691A]    Pre-op Diagnosis: Increased infection risk [Z91.89]    REMOVAL OF  PERITONEAL DIALYSIS CATHETER    Anesthesia Procedure: REMOVAL OF  PERITONEAL DIALYSIS CATHETER    Surgeon(s) and Role:     * Julio Cesar Reyes MD - Primary    Pre-op vitals reviewed.  Temp: 97.8 °F (36.6 °C)  Pulse: 79  Resp: 16  BP: 124/79  SpO2: 94 %  Body mass index is 26.54 kg/m².    Current medications reviewed.  Hospital Medications:   [MAR Hold] sodium chloride 0.9 % IV bolus 100 mL  100 mL Intravenous Q30 Min PRN    And    [MAR Hold] albumin human (Albumin) 25% injection 25 g  25 g Intravenous PRN Dialysis    [MAR Hold] epoetin jevon (Epogen, Procrit) 81095 UNIT/ML injection 10,000 Units  10,000 Units Intravenous Once in dialysis    [COMPLETED] vancomycin (Vancocin) 2.25 g in sodium chloride 0.9% 500 mL IVPB premix  25 mg/kg Intravenous Once    Vancomycin: PHARMACY DOSING  1 each Intravenous See Admin Instructions (RX holding)    [COMPLETED] cefTRIAXone (Rocephin) 1 g in D5W 100 mL IVPB-ADD  1 g Intravenous Once    [MAR Hold] allopurinol (Zyloprim) tab 50 mg  50 mg Oral Daily    [MAR Hold] gabapentin (Neurontin) cap 100 mg  100 mg Oral Q Tu, Th and Sa    [MAR Hold] memantine (Namenda) tab 20 mg  20 mg Oral Nightly    [MAR Hold] pravastatin (Pravachol) tab 20 mg  20 mg Oral Nightly    [MAR Hold] risperiDONE (RisperDAL) tab 0.5 mg  0.5 mg Oral Nightly    [MAR Hold] sevelamer carbonate (Renvela) tab 800 mg  800 mg Oral TID CC    [MAR Hold] tacrolimus (Prograf) cap 0.5 mg  0.5 mg Oral BID    [MAR Hold] tacrolimus (Prograf) cap 1 mg  1 mg Oral QAM    cefTRIAXone (Rocephin) 1 g in D5W 100 mL  IVPB-ADD  1 g Intravenous Q24H       Outpatient Medications:     Medications Prior to Admission   Medication Sig Dispense Refill Last Dose    HYDROcodone-acetaminophen 5-325 MG Oral Tab Take 1 tablet by mouth every 4 (four) hours as needed. 5 tablet 0 2/14/2024    lidocaine-menthol 4-1 % External Patch Place 2 patches onto the skin daily. 30 patch 0 Past Week    risperiDONE 0.5 MG Oral Tab Take 1.5 tablets (0.75 mg total) by mouth nightly. May also take 0.5 tablet (0.25 mg total) daily as needed (agitation). 60 tablet 11 2/14/2024    senna-docusate 8.6-50 MG Oral Tab Take 1 tablet by mouth daily. (Patient taking differently: Take 1 tablet by mouth daily as needed for constipation.) 30 tablet 0 Past Month    HYDROcodone-acetaminophen 5-325 MG Oral Tab Take 1-2 tablets by mouth every 6 (six) hours as needed for Pain. 10 tablet 0 2/14/2024    memantine 10 MG Oral Tab Take 2 tablets (20 mg total) by mouth at bedtime.   2/14/2024    tacrolimus 1 MG Oral Cap Take 1 capsule (1 mg total) by mouth daily. FOR HEART TRANSPLANT  PATIENT TAKES TACROLIMUS 1.5 MG IN THE AM AND 0.5 MG IN THE EVENING   2/14/2024    sevelamer carbonate 800 MG Oral Tab Take 1 tablet (800 mg total) by mouth 3 (three) times daily with meals.   2/14/2024    tacrolimus 0.5 MG Oral Cap Take 1 capsule (0.5 mg total) by mouth 2 (two) times daily. FOR HEART TRANSPLANT  0 2/14/2024    hydrOXYzine 25 MG Oral Tab Take 1 tablet (25 mg total) by mouth every 8 (eight) hours as needed for Itching. 90 tablet 3 2/14/2024    Pravastatin Sodium 20 MG Oral Tab Take 1 tablet (20 mg total) by mouth nightly.   2/14/2024    allopurinol 100 MG Oral Tab Take 0.5 tablets (50 mg total) by mouth daily.   2/14/2024    gabapentin 100 MG Oral Cap Take 1 capsule (100 mg total) by mouth Every Tuesday, Thursday, and Saturday.          Allergies: Amlodipine, Dalteparin, Lisinopril, Penicillins, and Oxycodone      Anesthesia Evaluation    Patient summary reviewed.    Anesthetic  Complications  (-) history of anesthetic complications         GI/Hepatic/Renal      (+) GERD       (+) chronic renal disease and CRI and peritoneal dialysis                   Cardiovascular  Comment: S/p heart transplant     Echo 10/2023      Conclusions:     1. Left ventricle: The cavity size was below normal. Wall thickness was      normal. Systolic function was hyperdynamic. The estimated ejection      fraction was 70-75%. Left ventricular diastolic function parameters were      normal.   2. Left atrium: The atrium was mildly dilated. Appearance was consistent      with transplant anatomy.   3. Right atrium: The atrium was mildly dilated. Appearance was consistent      with transplant anatomy.   4. Aortic valve: The valve was trileaflet. The leaflets were mildly      thickened and mildly calcified. Transvalvular velocity was minimally      increased. The findings were consistent with incrased flow due to      hyperdynamic left ventricle The mean systolic gradient was 12mm Hg.   5. Pulmonary arteries: Systolic pressure was within the normal range. The      peak systolic pressure is 29mm Hg.   Impressions:  This study is compared with previous dated 10-04-22: No   significant change since prior study.   *         Exercise tolerance: good     MET: >4    (-) obesity  (+) hypertension     (+) CAD                (+) CHF  (-) angina     (-) COYLE         Endo/Other      (-) diabetes                 (+) thrombocytopenia           Pulmonary      (-) asthma  (-) COPD       (+) shortness of breath     (+) sleep apnea       Neuro/Psych      (+) depression                        Patient Active Problem List:     Glaucoma     CKD (chronic kidney disease) stage 4, GFR 15-29 ml/min (HCC)     Right hip pain     Thrombocytopenia (HCC)     Chest pain, rule out acute myocardial infarction     Azotemia     Dementia (HCC)     Immunosuppressant-induced pancytopenia  (HCC)     Benign hypertensive heart disease with heart failure (HCC)      Complication of heart transplant (HCC)     Pruritic disorder     Sensorineural hearing loss     Obstructive sleep apnea syndrome     Mitral valve disorder     Mild cognitive impairment with memory loss     Impotence of organic origin     Long-term use of immunosuppressant medication     Immunosuppressed status ([facility])     Hyperlipidemia     History of heart transplant ([facility])     History of esophageal dilatation     Hearing decreased     Hallux valgus, acquired     GERD (gastroesophageal reflux disease)     Coronary atherosclerosis     CHF (congestive heart failure) ([facility])     Chronic gouty arthropathy     Personal history of colonic polyps     THADDEUS (acute kidney injury) ([facility])     Dementia with behavioral disturbance ([facility])     Depression     Calcineurin inhibitor causing toxicity in therapeutic use     Chronic cholecystitis     Heart transplant recipient (HCC)     Acute renal failure superimposed on chronic kidney disease, unspecified CKD stage, unspecified acute renal failure type     Muscle cramping     Dyspnea on exertion     Metabolic acidosis     Vertebral artery stenosis, unspecified laterality     Unsteady gait     Diplopia     ESRD on hemodialysis ([facility])     ESRD (end stage renal disease) ([facility])     Encephalopathy acute     Metabolic encephalopathy     Altered mental status     Primary hypertension     Anemia in ESRD (end-stage renal disease)  ([facility])     Hyperkalemia     Weakness     Anemia due to chronic kidney disease, on chronic dialysis ([facility])     ESRD on dialysis ([facility])     Syncope and collapse     ESRD (end stage renal disease) on dialysis ([facility])     PD catheter dysfunction ([facility])     Labile hypertension     Orthostatic hypotension     Abdominal wall cellulitis     Peritoneal dialysis catheter exit site infection, initial encounter ([facility])     Mechanical complication of peritoneal dialysis catheter, initial encounter ([facility])            Past Surgical History:   Procedure Laterality Date    ARTHROSCOPY OF JOINT UNLISTED  Right     rotator cuff repair    AV FISTULA REVISION, OPEN      incorrect info per wife    COLONOSCOPY      HEART TRANSPLANT  2006    [facility]     KNEE REPLACEMENT SURGERY Left 2018    x2    OTHER SURGICAL HISTORY  2021    Cysto Dr. Delgado    OTHER SURGICAL HISTORY  2024    PERITONEAL DIALYSIS CATHETER PLACEMENT    ROTATOR CUFF REPAIR      SINUS SURGERY        TONSILLECTOMY      TOTAL KNEE REPLACEMENT Left     2 on this leg    UPPER GI ENDOSCOPY,EXAM       Social History     Socioeconomic History    Marital status:    Tobacco Use    Smoking status: Former     Types: Cigarettes     Quit date:      Years since quittin.1    Smokeless tobacco: Never   Vaping Use    Vaping Use: Never used   Substance and Sexual Activity    Alcohol use: Yes     Comment: 1 beer per day    Drug use: Not Currently    Sexual activity: Yes   Other Topics Concern    Caffeine Concern Yes     Comment: rarely    Exercise No     History   Drug Use Unknown     Available pre-op labs reviewed.  Lab Results   Component Value Date    WBC 4.1 2024    RBC 3.25 (L) 2024    HGB 9.9 (L) 2024    HCT 27.3 (L) 2024    MCV 84.0 2024    MCH 30.5 2024    MCHC 36.3 2024    RDW 14.9 2024    .0 (L) 2024     Lab Results   Component Value Date     (L) 2024    K 4.5 2024    CL 99 2024    CO2 29.0 2024    BUN 54 (H) 2024    CREATSERUM 11.00 (H) 2024    GLU 88 2024    CA 8.3 (L) 2024            Airway      Mallampati: III  Mouth opening: >3 FB  TM distance: 4 - 6 cm  Neck ROM: full Cardiovascular    Cardiovascular exam normal.  Rhythm: regular  Rate: normal     Dental  Comment: Patient denies any loose/missing/cracked teeth. No gross abnormalities or loose teeth noted on exam.      Dentition appears grossly intact         Pulmonary    Pulmonary exam normal.                 Other findings              ASA: 4   Plan: general  NPO  status verified and patient meets guidelines.    Post-procedure pain management plan discussed with surgeon and patient.    Comment:     A detailed discussion about the anesthetic plan was held with Nader Campbell in the preoperative area. Discussed benefits and risks of general anesthesia including, reasonable expectations of post-operative pain, nausea, vomiting, injury to lips, gums, tongue, teeth, eyes, awareness under anesthesia, cardiac, pulmonary, aspiration, stroke, and death. All questions were answered appropriately and patient demonstrated understanding of realistic expectations and risks of undergoing anesthesia. Nader Campbell consents to receiving anesthesia and wishes to proceed.      Plan/risks discussed with: patient                Present on Admission:   Heart transplant recipient (HCC)

## 2024-02-17 NOTE — ANESTHESIA PROCEDURE NOTES
Airway  Date/Time: 2/16/2024 7:29 PM  Urgency: elective    Airway not difficult    General Information and Staff    Patient location during procedure: OR  Anesthesiologist: Jg Nagel MD  Performed: anesthesiologist   Performed by: Jg Nagel MD  Authorized by: Jg Nagel MD      Indications and Patient Condition  Indications for airway management: anesthesia  Spontaneous Ventilation: absent  Sedation level: deep  Preoxygenated: yes  Patient position: sniffing  Mask difficulty assessment: 1 - vent by mask    Final Airway Details  Final airway type: endotracheal airway      Successful airway: ETT  Cuffed: yes   Successful intubation technique: direct laryngoscopy  Facilitating devices/methods: intubating stylet  Endotracheal tube insertion site: oral  Blade: Ramiro  Blade size: #4  ETT size (mm): 7.5    Cormack-Lehane Classification: grade I - full view of glottis  Placement verified by: capnometry   Measured from: lips  ETT to lips (cm): 23  Number of attempts at approach: 1  Number of other approaches attempted: 0    Additional Comments    Teeth intact post-procedure.

## 2024-02-17 NOTE — PROGRESS NOTES
Pt A&Ox1-2 Hx Demantia.  Name and Knows He's in the Hospital.   Room Air  Restless in bed  No Urine Output (HD)  Reg Diet.   IV Abx  Pt Pulled Mult IV lines, pt hard Stick also Right arm Precaution.   Plan for Dialysis 2/17  Safety precaution maintained  Plan of Care ongoing    0330 pt continue to pull off Tele Monitoring, another IV line and getting out of Bed (High Fall Risk) Multiple times.   Brat was called to assist with pt screaming, coming extremely close to this RN face tell me to back up. Sly Vest was place for pt safety. Pt wife will be called in AM for Update.     0650 pt wife Update on the plan of Care.

## 2024-02-17 NOTE — ANESTHESIA POSTPROCEDURE EVALUATION
[facility] [place] [facility]:  [facility]   Age/Gender 78 year old male MRN [id number]   Location [facility] SURGERY Attending Lowell Salmon MD   Hosp Day # 1 PCP Anoop Keys MD       Anesthesia Post-op Note    REMOVAL OF  PERITONEAL DIALYSIS CATHETER    Procedure Summary       Date: 02/16/24 Room / Location:  MAIN OR 03 / [facility] MAIN OR    Anesthesia Start: 1918 Anesthesia Stop: 1954    Procedure: REMOVAL OF  PERITONEAL DIALYSIS CATHETER (Abdomen) Diagnosis:       Increased infection risk      (Increased infection risk [Z91.89])    Surgeons: Julio Cesar Reyes MD Anesthesiologist: Jg Nagel MD    Anesthesia Type: general ASA Status: 4            Anesthesia Type: general    Vitals Value Taken Time   BP 92/56 02/16/24 1955   Temp 97 02/16/24 1955   Pulse 80 02/16/24 1955   Resp 16 02/16/24 1955   SpO2 99% 02/16/24 1955       Patient Location: PACU    Anesthesia Type: general    Airway Patency: patent and extubated    Postop Pain Control: adequate    Mental Status: mildly sedated but able to meaningfully participate in the post-anesthesia evaluation    Nausea/Vomiting: none    Cardiopulmonary/Hydration status: stable euvolemic    Complications: no apparent anesthesia related complications    Postop vital signs: stable    Comments:   The airway was removed in the procedure area.  Cable monitors were removed, and the patient was transported with observation to the recovery area personally with the OR team.  The patient was responsive in a meaningful way and demonstrated a good airway.  PACU monitors were then applied with device connection to Epic.  Full report signout, including report, identifications, history, procedure, anesthesia course, recovery expectations with chance for questions was provided to a responsible recovery RN.    Dental Exam: Unchanged from Preop    Patient to be discharged from PACU when criteria met.

## 2024-02-17 NOTE — PROGRESS NOTES
[facility]  Progress Note    Nader Campbell Patient Status:  Inpatient    8/10/1945 MRN [id number]   Location [facility] Attending Lowell Salmon MD   Hosp Day # 2 PCP Anoop Keys MD     Subjective:  The patient was seen and examined at bedside during hemodialysis.  He is restrained.  He is frustrated that he is restrained.  He denies abdominal pain.  He denies nausea or vomiting.  He is unaware that he had surgery yesterday.    Vital signs are stable.  He is afebrile.    Objective/Physical Exam:  /83 (BP Location: Left leg)   Pulse 82   Temp 97.8 °F (36.6 °C) (Oral)   Resp 16   Ht 71\"   Wt 190 lb 4.8 oz (86.3 kg)   SpO2 97%   BMI 26.54 kg/m²     Intake/Output Summary (Last 24 hours) at 2024 1115  Last data filed at 2024 0848  Gross per 24 hour   Intake 590 ml   Output 2 ml   Net 588 ml         General: Patient is alert.  He is not oriented  HEENT: Normocephalic, atraumatic. No scleral icterus.  Pulmonary: No respiratory distress, effort normal.   Abdomen: Non-distended, without tympany to percussion. Soft, non-tender to palpation. No rebound or guarding. No peritoneal signs.   Incision: Left abdominal incision sites closed with staples.  There is some induration and erythema over the previous PD catheter site  Extremities: No lower extremity edema. No clubbing or cyanosis.  Soft restraints in place  Skin: Warm, dry. No jaundice.       Labs:  Lab Results   Component Value Date    WBC 4.3 2024    HGB 10.3 2024    HCT 28.2 2024    .0 2024      Lab Results   Component Value Date    INR 1.1 2020     Lab Results   Component Value Date     2024    K 4.9 2024    CL 98 2024    CO2 26.0 2024    BUN 65 2024    CREATSERUM 12.70 2024     2024    CA 8.8 2024          Assessment:  Patient Active Problem List   Diagnosis    Glaucoma    CKD (chronic kidney disease) stage 4, GFR 15-29  ml/min (HCC)    Right hip pain    Thrombocytopenia (HCC)    Chest pain, rule out acute myocardial infarction    Azotemia    Dementia (HCC)    Immunosuppressant-induced pancytopenia  (HCC)    Benign hypertensive heart disease with heart failure (HCC)    Complication of heart transplant (HCC)    Pruritic disorder    Sensorineural hearing loss    Obstructive sleep apnea syndrome    Mitral valve disorder    Mild cognitive impairment with memory loss    Impotence of organic origin    Long-term use of immunosuppressant medication    Immunosuppressed status (HCC)    Hyperlipidemia    History of heart transplant (HCC)    History of esophageal dilatation    Hearing decreased    Hallux valgus, acquired    GERD (gastroesophageal reflux disease)    Coronary atherosclerosis    CHF (congestive heart failure) (HCC)    Chronic gouty arthropathy    Personal history of colonic polyps    THADDEUS (acute kidney injury) (HCC)    Dementia with behavioral disturbance (HCC)    Depression    Calcineurin inhibitor causing toxicity in therapeutic use    Chronic cholecystitis    Heart transplant recipient (HCC)    Acute renal failure superimposed on chronic kidney disease, unspecified CKD stage, unspecified acute renal failure type    Muscle cramping    Dyspnea on exertion    Metabolic acidosis    Vertebral artery stenosis, unspecified laterality    Unsteady gait    Diplopia    ESRD on hemodialysis (HCC)    ESRD (end stage renal disease) (HCC)    Encephalopathy acute    Metabolic encephalopathy    Altered mental status    Primary hypertension    Anemia in ESRD (end-stage renal disease)  (HCC)    Hyperkalemia    Weakness    Anemia due to chronic kidney disease, on chronic dialysis (HCC)    ESRD on dialysis (HCC)    Syncope and collapse    ESRD (end stage renal disease) on dialysis (HCC)    PD catheter dysfunction (HCC)    Labile hypertension    Orthostatic hypotension    Abdominal wall cellulitis    Peritoneal dialysis catheter exit site infection,  initial encounter (HCC)    Mechanical complication of peritoneal dialysis catheter, initial encounter (HCC)     POD 1 removal of infected PD catheter    Plan:  The patient appears to be doing well postoperatively.  He may discharge home from a general surgical standpoint once cleared by other services  General diet  Antiemetics and analgesics as needed  Medical management per primary  The patient should follow-up with [facility] General surgery in 10 to 14 days for staple removal  General surgery will sign off        My total face time with this patient was 25 minutes. Greater than half of the visit was spent in counseling the patient on the above listed diagnoses and treatment options.     Raiza Tapia PA-C  2/17/2024  11:15 AM

## 2024-02-17 NOTE — PROGRESS NOTES
[facility]     [facility] Consult    Nader Charly Adrian Patient Status:  Inpatient    8/10/1945 MRN [id number]   Location [facility] 3NW-A Attending Lowell Salmon MD   Hosp Day # 2 PCP MD Nader Dolan seen and examined,   Afebrile,   Previous entries noted   Much alert today     History:  Past Medical History:   Diagnosis Date    Back problem     BPH (benign prostatic hyperplasia)     Cataract     CKD (chronic kidney disease)     Congestive heart disease (HCC)     before transplant in     Dialysis patient (Formerly [facility]) 2023    HEMODIALYSIS TUES/THURS/SAT 3.5HRS    Esophageal reflux     Fistula     RIGHT ARM    Glaucoma     Gout     Hearing impairment     HA's x2, doesnt wear    Heart transplanted (Formerly [facility]) 2006-@ [facility]    High blood pressure     High cholesterol     Hypercholesterolemia     Muscle weakness     uses a cane- balance issues    Obstructive sleep apnea syndrome 2009    Problems with swallowing     Renal disorder     CKD    Short-term memory loss     Shortness of breath     Sleep apnea     had UPPP did not need device after    Visual impairment     glasses     Past Surgical History:   Procedure Laterality Date    ARTHROSCOPY OF JOINT UNLISTED Right     rotator cuff repair    AV FISTULA REVISION, OPEN      incorrect info per wife    COLONOSCOPY      HEART TRANSPLANT      [facility]     KNEE REPLACEMENT SURGERY Left 2018    x2    OTHER SURGICAL HISTORY  2021    Cysto Dr. Delgado    OTHER SURGICAL HISTORY  2024    PERITONEAL DIALYSIS CATHETER PLACEMENT    ROTATOR CUFF REPAIR      SINUS SURGERY        TONSILLECTOMY      TOTAL KNEE REPLACEMENT Left     2 on this leg    UPPER GI ENDOSCOPY,EXAM       Family History   Problem Relation Age of Onset    Heart Disorder Father         heart attack    Heart Disorder Mother         CHF      reports that he quit smoking about 14 years ago. His smoking use included cigarettes. He has  never used smokeless tobacco. He reports current alcohol use. He reports that he does not currently use drugs.    Allergies:  Allergies   Allergen Reactions    Amlodipine OTHER (SEE COMMENTS) and SWELLING     Leg swelling    Dalteparin HIVES and RASH     Fragmin      Lisinopril OTHER (SEE COMMENTS)     Hyperkalemia     Penicillins HIVES, RASH, SWELLING and SHORTNESS OF BREATH    Oxycodone OTHER (SEE COMMENTS)     Lost his taste and wt loss       Medications:    Current Facility-Administered Medications:     sodium chloride 0.9 % IV bolus 100 mL, 100 mL, Intravenous, Q30 Min PRN **AND** albumin human (Albumin) 25% injection 25 g, 25 g, Intravenous, PRN Dialysis    Vancomycin: PHARMACY DOSING, 1 each, Intravenous, See Admin Instructions (RX holding)    acetaminophen (Tylenol) tab 650 mg, 650 mg, Oral, Q6H PRN    ibuprofen (Motrin) tab 400 mg, 400 mg, Oral, Q6H PRN    allopurinol (Zyloprim) tab 50 mg, 50 mg, Oral, Daily    gabapentin (Neurontin) cap 100 mg, 100 mg, Oral, Q Tu, Th and Sa    memantine (Namenda) tab 20 mg, 20 mg, Oral, Nightly    pravastatin (Pravachol) tab 20 mg, 20 mg, Oral, Nightly    risperiDONE (RisperDAL) tab 0.5 mg, 0.5 mg, Oral, Nightly    sevelamer carbonate (Renvela) tab 800 mg, 800 mg, Oral, TID CC    tacrolimus (Prograf) cap 0.5 mg, 0.5 mg, Oral, BID    tacrolimus (Prograf) cap 1 mg, 1 mg, Oral, QAM    cefTRIAXone (Rocephin) 1 g in D5W 100 mL IVPB-ADD, 1 g, Intravenous, Q24H    Review of Systems:   Constitutional: Negative for anorexia, chills, fatigue, fevers, malaise, night sweats and weight loss.  Eyes: Negative for visual disturbance, irritation and redness.  Ears, nose, mouth, throat, and face: Negative for hearing loss, tinnitus, nasal congestion, snoring, sore throat, hoarseness and voice change.  Respiratory: Negative for cough, sputum, hemoptysis, chest pain, wheezing, dyspnea on exertion, or stridor.  Cardiovascular: Negative for chest pain, palpitations, irregular heart beats,  syncope, fatigue, orthopnea, paroxysmal nocturnal dyspnea, lower extremity edema.  Gastrointestinal: Negative for dysphagia, odynophagia, reflux symptoms, nausea, vomiting, change in bowel habits, diarrhea, constipation and abdominal pain.  Integument/breast: Negative for rash, skin lesions, and pruritus.  Hematologic/lymphatic: Negative for easy bruising, bleeding, and lymphadenopathy.  Musculoskeletal: Negative for myalgias, arthralgias, muscle weakness.  Neurological: Negative for headaches, dizziness, seizures, memory problems, trouble swallowing, speech problems, gait problems and weakness.  Behavioral/Psych: Negative for active tobacco use.  Endocrine: No history of of diabetes, thyroid disorder.  Unable to obtain,     Vital signs in last 24 hours:  Patient Vitals for the past 24 hrs:   BP Temp Temp src Pulse Resp SpO2 Height Weight   02/16/24 1106 100/64 97.6 °F (36.4 °C) Oral 76 16 94 % -- --   02/16/24 0824 119/77 97.5 °F (36.4 °C) Oral 75 16 99 % -- --   02/16/24 0316 (!) 129/93 97.8 °F (36.6 °C) Oral 92 16 96 % -- --   02/16/24 0247 -- -- -- -- -- -- -- 190 lb 4.8 oz (86.3 kg)   02/15/24 2234 -- -- -- -- -- -- -- 192 lb 9.6 oz (87.4 kg)   02/15/24 2226 125/84 97.7 °F (36.5 °C) Oral 88 16 97 % 5' 11\" (1.803 m) 192 lb 9.6 oz (87.4 kg)   02/15/24 2130 119/76 -- -- 84 14 -- -- --   02/15/24 2030 110/79 -- -- 76 14 -- -- --   02/15/24 2029 110/79 -- -- 77 18 -- -- --   02/15/24 1845 91/66 -- -- 75 15 -- -- --   02/15/24 1745 115/81 -- -- 83 11 94 % -- --   02/15/24 1349 120/71 98.4 °F (36.9 °C) Temporal 81 16 96 % 5' 11\" (1.803 m) 184 lb (83.5 kg)       Physical Exam:   General: alert, cooperative, oriented.  No respiratory distress.   Head: Normocephalic, without obvious abnormality, atraumatic.   Eyes: Conjunctivae/corneas clear.  No scleral icterus.  No conjunctival     hemorrhage.   Nose: Nares normal.   Throat: Lips, mucosa, and tongue normal.  No thrush noted.   Neck: Soft, supple neck; trachea midline,  no adenopathy, no thyromegaly.   Lungs: CTAB, normal and equal bilateral chest rise   Chest wall: No tenderness or deformity.   Heart: Regular rate and rhythm, normal S1S2, no murmur.   Abdomen: soft, non-distended,BS+ tenderness, catheter removed no     rebound, positive BS.   Extremity: no edema, no cyanosis   Skin: No rashes or lesions.   Neurological: Alert, interactive, no focal deficits    Labs:  Lab Results   Component Value Date    WBC 4.3 02/17/2024    HGB 10.3 02/17/2024    HCT 28.2 02/17/2024    .0 02/17/2024    CREATSERUM 12.70 02/17/2024    BUN 65 02/17/2024     02/17/2024    K 4.9 02/17/2024    CL 98 02/17/2024    CO2 26.0 02/17/2024     02/17/2024    CA 8.8 02/17/2024    MG 2.9 02/17/2024       Radiology:  CT- 1. Peritoneal dialysis catheter along the left anterior abdominal wall where there is mild surrounding inflammatory change in fatty induration that could represent changes of cellulitis.  No drainable soft tissue abscess identified in this region by   noncontrast exam.      2. Possible sludge in the gallbladder.  There is gallbladder wall thickening and mild inflammatory changes along the gallbladder fossa tracking into the cortez hepatis raising the possibility of acute cholecystitis in the appropriate clinical setting.    Clinical correlation recommended.      3. Small amount of ascites in the abdomen and pelvis.      Please see above for further details.       Cultures:  Reviewed     Assessment and Plan:    1.  Anterior abdominal wall cellulitis around the PD Catheter:   - CT scan with mild surrounding inflammatory change in fatty induration that could represent changes of cellulitis around PD Catheter,   - Surgery input noted,   - S/P removal of PD Catheter, 2/16/24  - Cul with E coli,   - On IV Vanc and Ceftriaxone, tolerating it well,     2.   Gall bladder wall thickening with mild inflammatory changes: No abn LFTs,  - No tenderness on my exam, will follow surgery input,      3.   ESRD: On HD now,   - Follow Blood cul,     4.   Immunocompromised state: sec to ESRD, Heart Transplant,   - On Tacrolimus,     5.   Disposition: in house, an elderly male with dementia, admitted with worsening abdominal pain, sec to infected dislodged PD Cathter, rule out biliary source in association,   - Follow Pending cul,   - Continue IV Ceftriaxone, IV Vanc, pharm to dose,   - Pain management per PCP,     Discussed with RN, all questions answered, further recommendations to follow, Thanks,         Thank you for consulting [facility] for Nader Campbell.  If you have any questions or concerns please call [facility] Infectious Disease at [phone].     Nghia Simmons MD  2/16/2024  12:44 PM

## 2024-02-17 NOTE — PROGRESS NOTES
[facility] Hospitalist Progress Note                                                                     [facility]      Nader Parks [place]  8/10/1945    SUBJECTIVE: Pt with dementia, No complaints     OBJECTIVE:  Temp:  [97.5 °F (36.4 °C)-98 °F (36.7 °C)] 97.8 °F (36.6 °C)  Pulse:  [75-91] 83  Resp:  [13-18] 18  BP: ()/(57-89) 110/73  SpO2:  [93 %-99 %] 97 %  Exam  Gen: No acute distress, alert  Pulm: Lungs clear bilaterally, normal respiratory effort, no crackles, no wheezing  CV: Heart with regular rate and rhythm, no murmur  Abd: Abdomen soft, tender at the previous PD site, nondistended, bowel sounds present,   MSK: No significant pitting edema or tenderness of the LE  Skin: no new rashes or lesions    Labs:   Recent Labs   Lab 02/15/24  1748 02/16/24  0530 02/17/24  0644   WBC 4.9 4.1 4.3   HGB 10.7* 9.9* 10.3*   MCV 85.8 84.0 84.4   .0* 118.0* 135.0*       Recent Labs   Lab 02/15/24  1748 02/15/24  1926 02/16/24  0530 02/17/24  0644   *  --  135* 134*   K 5.9* 4.8 4.5 4.9     --  99 98   CO2 27.0  --  29.0 26.0   BUN 46*  --  54* 65*   CREATSERUM 10.20*  --  11.00* 12.70*   CA 8.5  --  8.3* 8.8   MG  --   --  2.8* 2.9*   GLU 92  --  88 104*       Recent Labs   Lab 02/15/24  1748   ALT 16   AST 47*   ALB 3.1*       Recent Labs   Lab 02/16/24  1739   PGLU 84       Meds:   Scheduled:    epoetin jevon  10,000 Units Intravenous Once in dialysis    Vancomycin IV  1 each Intravenous See Admin Instructions (RX holding)    allopurinol  50 mg Oral Daily    gabapentin  100 mg Oral Q Tu, Th and Sa    memantine  20 mg Oral Nightly    pravastatin  20 mg Oral Nightly    risperiDONE  0.5 mg Oral Nightly    sevelamer carbonate  800 mg Oral TID CC    tacrolimus  0.5 mg Oral BID    tacrolimus  1 mg Oral QAM    cefTRIAXone  1 g Intravenous Q24H     Continuous Infusions:   PRN: sodium chloride **AND** albumin human, acetaminophen, ibuprofen    ASSESSMENT /  PLAN:   78 year old male with history of BPH, CKD, congestive heart failure with history of heart transplant, hemodialysis, GERD, glaucoma, gout, hypertension, hyperlipidemia, obstructive sleep apnea presenting with PD catheter pain.     PD catheter pain  -surgery consulted  -POD # 1 s/p removal of PD catheter     Cellulitis  -concern for cellulitis at PD catheter site  -IV ceftriaxone  -ID following     ESRD  -HD per renal consulted     Dementia  -namenda  -risperidone     HLD  -pravastatin      Hx Heart transplant  -tacrolimus      Gout  -allopurinol      Quality:  DVT Prophylaxis: scd  CODE status: DNR comfort per chart  Pozo:      Plan of care discussed with patient and staff     Dispo: discharge when ok with renal, surgery and abx decided on by ID     Lowell Salmon MD  [facility] Hospitalist  [phone]

## 2024-02-17 NOTE — PROGRESS NOTES
Assumed care of patient at 0700.  Continues on bilat wrist restraints and posey/ x4 side rails due to increased confusion/ agitation and discontinuing medical equipment. Trial off restraints for 1 hour on shift and patient pulled x3 ivs. Restraints replaced for safety. HD on shift today with no complication. Continues on iv abx. Updated on plan of care and condition update. All needs met.

## 2024-02-17 NOTE — PROGRESS NOTES
Pt back to floor, received from pacu.  Alert to self and place only, vss  2 lap sites with staples, gauze and tape,cdi.  Regular diet now, pt denies nausea, denies pain. Updated on the poc, call light in reach, bed alarm activated

## 2024-02-18 LAB
ANION GAP SERPL CALC-SCNC: 7 MMOL/L (ref 0–18)
BASOPHILS # BLD AUTO: 0.02 X10(3) UL (ref 0–0.2)
BASOPHILS NFR BLD AUTO: 0.5 %
BUN BLD-MCNC: 44 MG/DL (ref 9–23)
CALCIUM BLD-MCNC: 8.7 MG/DL (ref 8.5–10.1)
CHLORIDE SERPL-SCNC: 99 MMOL/L (ref 98–112)
CO2 SERPL-SCNC: 25 MMOL/L (ref 21–32)
CREAT BLD-MCNC: 9.65 MG/DL
EGFRCR SERPLBLD CKD-EPI 2021: 5 ML/MIN/1.73M2 (ref 60–?)
EOSINOPHIL # BLD AUTO: 0.25 X10(3) UL (ref 0–0.7)
EOSINOPHIL NFR BLD AUTO: 6.8 %
ERYTHROCYTE [DISTWIDTH] IN BLOOD BY AUTOMATED COUNT: 14.9 %
GLUCOSE BLD-MCNC: 91 MG/DL (ref 70–99)
HCT VFR BLD AUTO: 28.1 %
HGB BLD-MCNC: 9.6 G/DL
IMM GRANULOCYTES # BLD AUTO: 0.01 X10(3) UL (ref 0–1)
IMM GRANULOCYTES NFR BLD: 0.3 %
LYMPHOCYTES # BLD AUTO: 0.94 X10(3) UL (ref 1–4)
LYMPHOCYTES NFR BLD AUTO: 25.5 %
MAGNESIUM SERPL-MCNC: 2.7 MG/DL (ref 1.6–2.6)
MCH RBC QN AUTO: 30.3 PG (ref 26–34)
MCHC RBC AUTO-ENTMCNC: 34.2 G/DL (ref 31–37)
MCV RBC AUTO: 88.6 FL
MONOCYTES # BLD AUTO: 0.54 X10(3) UL (ref 0.1–1)
MONOCYTES NFR BLD AUTO: 14.7 %
NEUTROPHILS # BLD AUTO: 1.92 X10 (3) UL (ref 1.5–7.7)
NEUTROPHILS # BLD AUTO: 1.92 X10(3) UL (ref 1.5–7.7)
NEUTROPHILS NFR BLD AUTO: 52.2 %
OSMOLALITY SERPL CALC.SUM OF ELEC: 283 MOSM/KG (ref 275–295)
PLATELET # BLD AUTO: 137 10(3)UL (ref 150–450)
POTASSIUM SERPL-SCNC: 4.7 MMOL/L (ref 3.5–5.1)
RBC # BLD AUTO: 3.17 X10(6)UL
SODIUM SERPL-SCNC: 131 MMOL/L (ref 136–145)
WBC # BLD AUTO: 3.7 X10(3) UL (ref 4–11)

## 2024-02-18 PROCEDURE — 99232 SBSQ HOSP IP/OBS MODERATE 35: CPT | Performed by: INTERNAL MEDICINE

## 2024-02-18 RX ORDER — SENNA AND DOCUSATE SODIUM 50; 8.6 MG/1; MG/1
1 TABLET, FILM COATED ORAL DAILY PRN
Status: SHIPPED | COMMUNITY
Start: 2024-02-18

## 2024-02-18 NOTE — PROGRESS NOTES
[facility] Hospitalist Progress Note                                                                     [facility]      Nader Parks [place]  8/10/1945    SUBJECTIVE: Pt with dementia, No complaints     OBJECTIVE:  Temp:  [97.6 °F (36.4 °C)-98.5 °F (36.9 °C)] 98.5 °F (36.9 °C)  Pulse:  [77-89] 77  Resp:  [16-18] 18  BP: (107-137)/(65-83) 111/76  SpO2:  [94 %-98 %] 94 %  Exam  Gen: No acute distress, alert  Pulm: Lungs clear bilaterally, normal respiratory effort, no crackles, no wheezing  CV: Heart with regular rate and rhythm, no murmur  Abd: Abdomen soft, tender at the previous PD site, nondistended, bowel sounds present,   MSK: No significant pitting edema or tenderness of the LE  Skin: no new rashes or lesions    Labs:   Recent Labs   Lab 02/15/24  1748 02/16/24  0530 02/17/24  0644 02/18/24  0548   WBC 4.9 4.1 4.3 3.7*   HGB 10.7* 9.9* 10.3* 9.6*   MCV 85.8 84.0 84.4 88.6   .0* 118.0* 135.0* 137.0*       Recent Labs   Lab 02/15/24  1748 02/15/24  1926 02/16/24  0530 02/17/24  0644 02/18/24  0548   *  --  135* 134* 131*   K 5.9* 4.8 4.5 4.9 4.7     --  99 98 99   CO2 27.0  --  29.0 26.0 25.0   BUN 46*  --  54* 65* 44*   CREATSERUM 10.20*  --  11.00* 12.70* 9.65*   CA 8.5  --  8.3* 8.8 8.7   MG  --   --  2.8* 2.9* 2.7*   GLU 92  --  88 104* 91       Recent Labs   Lab 02/15/24  1748   ALT 16   AST 47*   ALB 3.1*       Recent Labs   Lab 02/16/24  1739   PGLU 84       Meds:   Scheduled:    [START ON 2/20/2024] ceFAZolin  1 g Intravenous Once per day on Tuesday Thursday Saturday    allopurinol  50 mg Oral Daily    gabapentin  100 mg Oral Q Tu, Th and Sa    memantine  20 mg Oral Nightly    pravastatin  20 mg Oral Nightly    risperiDONE  0.5 mg Oral Nightly    sevelamer carbonate  800 mg Oral TID CC    tacrolimus  0.5 mg Oral BID    tacrolimus  1 mg Oral QAM     Continuous Infusions:   PRN: risperiDONE, acetaminophen, ibuprofen    ASSESSMENT / PLAN:   78  year old male with history of BPH, CKD, congestive heart failure with history of heart transplant, hemodialysis, GERD, glaucoma, gout, hypertension, hyperlipidemia, obstructive sleep apnea presenting with PD catheter pain.     PD catheter pain  -surgery consulted  -POD # 2 s/p removal of PD catheter     Cellulitis  -concern for cellulitis at PD catheter site  -IV ceftriaxone--> iv cefazolin with HD for 2 weeks through 3/1  -ID following--> ok to dc once above can be set up     ESRD  -HD per renal     Dementia  -namenda  -risperidone     HLD  -pravastatin      Hx Heart transplant  -tacrolimus      Gout  -allopurinol      Quality:  DVT Prophylaxis: scd  CODE status: DNR comfort per chart  Pozo:      Plan of care discussed with patient and staff     Dispo: discharge when abx set up      Lowell Salmon MD  [facility] Hospitalist  [phone]

## 2024-02-18 NOTE — CM/SW NOTE
MSW called [facility] 2x. No answer, no way to leave vm to discuss pt needing IVabex with HD

## 2024-02-18 NOTE — PROGRESS NOTES
[facility]  Progress Note    Nader Campbell Patient Status:  Inpatient    8/10/1945 MRN [id number]   Location [facility] 3NW-A Attending Lowell Salmon MD   Hosp Day # 3 PCP Anoop Keys MD   No acute events  Care reviewed w/ wife  Pt more calm this am  Denies any pain/f/c/n/v    Medications:    Current Facility-Administered Medications:     [START ON 2024] ceFAZolin (Ancef) 1 g in dextrose 5% 100mL IVPB-ADD, 1 g, Intravenous, Once per day on     lidocaine-menthol 4-1 % patch 1 patch, 1 patch, Transdermal, Daily    risperiDONE (RisperDAL) tab 0.25 mg, 0.25 mg, Oral, Daily PRN    acetaminophen (Tylenol) tab 650 mg, 650 mg, Oral, Q6H PRN    ibuprofen (Motrin) tab 400 mg, 400 mg, Oral, Q6H PRN    allopurinol (Zyloprim) tab 50 mg, 50 mg, Oral, Daily    gabapentin (Neurontin) cap 100 mg, 100 mg, Oral, Q ,  and Sa    memantine (Namenda) tab 20 mg, 20 mg, Oral, Nightly    pravastatin (Pravachol) tab 20 mg, 20 mg, Oral, Nightly    risperiDONE (RisperDAL) tab 0.5 mg, 0.5 mg, Oral, Nightly    sevelamer carbonate (Renvela) tab 800 mg, 800 mg, Oral, TID CC    tacrolimus (Prograf) cap 0.5 mg, 0.5 mg, Oral, BID    tacrolimus (Prograf) cap 1 mg, 1 mg, Oral, QAM  Prior to Admission Medications   Medication Sig    ceFAZolin in sodium chloride 0.9% 2 g/100mL Intravenous Solution Inject 100 mL (2 g total) into the vein every 8 (eight) hours for 12 days. Cefazolin 2 gm IVPB Post HD through 3/01/24    senna-docusate 8.6-50 MG Oral Tab Take 1 tablet by mouth daily as needed for constipation.       Review of Systems:  Pt cannot provide details      Physical Exam:   /76 (BP Location: Left arm)   Pulse 77   Temp 98.5 °F (36.9 °C) (Oral)   Resp 18   Ht 5' 11\" (1.803 m)   Wt 192 lb 7.4 oz (87.3 kg)   SpO2 94%   BMI 26.84 kg/m²   Temp (24hrs), Av °F (36.7 °C), Min:97.6 °F (36.4 °C), Max:98.5 °F (36.9 °C)       Intake/Output Summary (Last 24 hours) at 2024 1121  Last data  filed at 2/17/2024 1658  Gross per 24 hour   Intake 0 ml   Output 0 ml   Net 0 ml     Last 3 Weights   02/18/24 0133 192 lb 7.4 oz (87.3 kg)   02/16/24 0247 190 lb 4.8 oz (86.3 kg)   02/15/24 2234 192 lb 9.6 oz (87.4 kg)   02/15/24 2226 192 lb 9.6 oz (87.4 kg)   02/15/24 1349 184 lb (83.5 kg)   02/14/24 1624 186 lb (84.4 kg)   02/10/24 0401 186 lb 8.2 oz (84.6 kg)   02/09/24 1706 185 lb (83.9 kg)   02/09/24 1044 180 lb (81.6 kg)   02/01/24 0400 189 lb (85.7 kg)   01/31/24 1910 193 lb 12.8 oz (87.9 kg)   01/31/24 1047 185 lb (83.9 kg)   01/26/24 0952 186 lb 12.8 oz (84.7 kg)   01/09/24 1518 182 lb (82.6 kg)     General: awake/alert   HEENT: No scleral icterus, MMM  Neck: Supple, no ROXANN or thyromegaly  Cardiac: Regular rate and rhythm, S1, S2 normal, no murmur or rub  Lungs: Clear without wheezes, rales, rhonchi.    Abdomen: Soft, tender at exit site. + bowel sounds  Extremities: Without clubbing, cyanosis or edema. R arm AVF with bruit/thrill- needles in place   Neurologic:  moving all extremities  Skin: Warm and dry, no rashes  Recent Labs     02/15/24  1748 02/16/24  0530 02/17/24  0644 02/18/24  0548   WBC 4.9 4.1 4.3 3.7*   HGB 10.7* 9.9* 10.3* 9.6*   MCV 85.8 84.0 84.4 88.6   .0* 118.0* 135.0* 137.0*       Recent Labs     02/15/24  1748 02/15/24  1926 02/16/24  0530 02/17/24  0644 02/18/24  0548   *  --  135* 134* 131*   K 5.9* 4.8 4.5 4.9 4.7     --  99 98 99   CO2 27.0  --  29.0 26.0 25.0   BUN 46*  --  54* 65* 44*   CREATSERUM 10.20*  --  11.00* 12.70* 9.65*   CA 8.5  --  8.3* 8.8 8.7   MG  --   --  2.8* 2.9* 2.7*       Recent Labs     02/15/24  1748   ALT 16   AST 47*   ALB 3.1*       Impression/Plan:    1.  Dislodged Tenckhoff catheter- appreciate surgery eval.  S/p Removal. HE is not a candidate for CCPD moving forward due to dementia.    2.  ESRD- HD to cont per usual TTHS schedule ; pt can get ancef at HD x 2 wks (post HD through 3/1/24 - 1g on Tues/Thurs, 2 g on Saturday)    3.   Anemia- due to ESRD.  Cont ERVIN for goal hgb 10-11 gms    4.  Heart transplant- on tacro    5. Dementia    Thank you for allowing me to participate in the care of your patient. Please do not hesitate to call with any questions or concerns.       Antonio Taylor  2/18/2024

## 2024-02-18 NOTE — PROGRESS NOTES
Alert and oriented to self. VSS. On RA. No BM this shift. Dialysis on Tuesday Thursday sat pt is anuric. No complaints of nausea or sob.Right arm precautions AV fistula. Patient has soft wrist restraints on and a posey vest per order. Pt ripped IV out no iV access. Up with 1 assist . Pt will go home with antibiotics to be administered after dialysis. Continent of bowel. Feeds self. Updated the family on plan of care. Call light in reach.

## 2024-02-18 NOTE — PLAN OF CARE
Pt is alert orientated to self, pleasant this evening.  VSS.  Maintaining o2 sats on r/a.  No BM this shift.  Dialysis pt, he is anuric.  No c/o pain at this time.  Tolerating regular diet well.  IV abx as ordered.  Safety precautions in place.  Non-violent restraints in place as well,  see flowsheet.  Pt updated on poc.  All questions and concerns addressed at this time.

## 2024-02-18 NOTE — PROGRESS NOTES
[facility]     [facility] Consult    Nader Campbell Patient Status:  Inpatient    8/10/1945 MRN [id number]   Location [facility] 3NW-A Attending Lowell Salmon MD   Hosp Day # 3 PCP MD Nader Dolan seen and examined,   Afebrile,   Previous entries noted   Much alert today   Able to answer questions properly     History:  Past Medical History:   Diagnosis Date    Back problem     BPH (benign prostatic hyperplasia)     Cataract     CKD (chronic kidney disease)     Congestive heart disease (HCC)     before transplant in     Dialysis patient ([facility]) 2023    HEMODIALYSIS TUES/THURS/SAT 3.5HRS    Esophageal reflux     Fistula     RIGHT ARM    Glaucoma     Gout     Hearing impairment     HA's x2, doesnt wear    Heart transplanted ([facility]) 2006-@ [facility]    High blood pressure     High cholesterol     Hypercholesterolemia     Muscle weakness     uses a cane- balance issues    Obstructive sleep apnea syndrome 2009    Problems with swallowing     Renal disorder     CKD    Short-term memory loss     Shortness of breath     Sleep apnea     had UPPP did not need device after    Visual impairment     glasses     Past Surgical History:   Procedure Laterality Date    ARTHROSCOPY OF JOINT UNLISTED Right     rotator cuff repair    AV FISTULA REVISION, OPEN      incorrect info per wife    COLONOSCOPY      HEART TRANSPLANT      [facility]     KNEE REPLACEMENT SURGERY Left 2018    x2    OTHER SURGICAL HISTORY  2021    Cysto Dr. Delgado    OTHER SURGICAL HISTORY  2024    PERITONEAL DIALYSIS CATHETER PLACEMENT    ROTATOR CUFF REPAIR      SINUS SURGERY        TONSILLECTOMY      TOTAL KNEE REPLACEMENT Left     2 on this leg    UPPER GI ENDOSCOPY,EXAM       Family History   Problem Relation Age of Onset    Heart Disorder Father         heart attack    Heart Disorder Mother         CHF      reports that he quit smoking about 14 years ago. His  smoking use included cigarettes. He has never used smokeless tobacco. He reports current alcohol use. He reports that he does not currently use drugs.    Allergies:  Allergies   Allergen Reactions    Amlodipine OTHER (SEE COMMENTS) and SWELLING     Leg swelling    Dalteparin HIVES and RASH     Fragmin      Lisinopril OTHER (SEE COMMENTS)     Hyperkalemia     Penicillins HIVES, RASH, SWELLING and SHORTNESS OF BREATH    Oxycodone OTHER (SEE COMMENTS)     Lost his taste and wt loss       Medications:    Current Facility-Administered Medications:     risperiDONE (RisperDAL) tab 0.25 mg, 0.25 mg, Oral, Daily PRN    sodium chloride 0.9 % IV bolus 100 mL, 100 mL, Intravenous, Q30 Min PRN **AND** albumin human (Albumin) 25% injection 25 g, 25 g, Intravenous, PRN Dialysis    Vancomycin: PHARMACY DOSING, 1 each, Intravenous, See Admin Instructions (RX holding)    acetaminophen (Tylenol) tab 650 mg, 650 mg, Oral, Q6H PRN    ibuprofen (Motrin) tab 400 mg, 400 mg, Oral, Q6H PRN    allopurinol (Zyloprim) tab 50 mg, 50 mg, Oral, Daily    gabapentin (Neurontin) cap 100 mg, 100 mg, Oral, Q Tu, Th and Sa    memantine (Namenda) tab 20 mg, 20 mg, Oral, Nightly    pravastatin (Pravachol) tab 20 mg, 20 mg, Oral, Nightly    risperiDONE (RisperDAL) tab 0.5 mg, 0.5 mg, Oral, Nightly    sevelamer carbonate (Renvela) tab 800 mg, 800 mg, Oral, TID CC    tacrolimus (Prograf) cap 0.5 mg, 0.5 mg, Oral, BID    tacrolimus (Prograf) cap 1 mg, 1 mg, Oral, QAM    cefTRIAXone (Rocephin) 1 g in D5W 100 mL IVPB-ADD, 1 g, Intravenous, Q24H    Review of Systems:   Constitutional: Negative for anorexia, chills, fatigue, fevers, malaise, night sweats and weight loss.  Eyes: Negative for visual disturbance, irritation and redness.  Ears, nose, mouth, throat, and face: Negative for hearing loss, tinnitus, nasal congestion, snoring, sore throat, hoarseness and voice change.  Respiratory: Negative for cough, sputum, hemoptysis, chest pain, wheezing, dyspnea on  exertion, or stridor.  Cardiovascular: Negative for chest pain, palpitations, irregular heart beats, syncope, fatigue, orthopnea, paroxysmal nocturnal dyspnea, lower extremity edema.  Gastrointestinal: Negative for dysphagia, odynophagia, reflux symptoms, nausea, vomiting, change in bowel habits, diarrhea, constipation and abdominal pain.  Integument/breast: Negative for rash, skin lesions, and pruritus.  Hematologic/lymphatic: Negative for easy bruising, bleeding, and lymphadenopathy.  Musculoskeletal: Negative for myalgias, arthralgias, muscle weakness.  Neurological: Negative for headaches, dizziness, seizures, memory problems, trouble swallowing, speech problems, gait problems and weakness.  Behavioral/Psych: Negative for active tobacco use.  Endocrine: No history of of diabetes, thyroid disorder.  Unable to obtain,     Vital signs in last 24 hours:  Patient Vitals for the past 24 hrs:   BP Temp Temp src Pulse Resp SpO2 Height Weight   02/16/24 1106 100/64 97.6 °F (36.4 °C) Oral 76 16 94 % -- --   02/16/24 0824 119/77 97.5 °F (36.4 °C) Oral 75 16 99 % -- --   02/16/24 0316 (!) 129/93 97.8 °F (36.6 °C) Oral 92 16 96 % -- --   02/16/24 0247 -- -- -- -- -- -- -- 190 lb 4.8 oz (86.3 kg)   02/15/24 2234 -- -- -- -- -- -- -- 192 lb 9.6 oz (87.4 kg)   02/15/24 2226 125/84 97.7 °F (36.5 °C) Oral 88 16 97 % 5' 11\" (1.803 m) 192 lb 9.6 oz (87.4 kg)   02/15/24 2130 119/76 -- -- 84 14 -- -- --   02/15/24 2030 110/79 -- -- 76 14 -- -- --   02/15/24 2029 110/79 -- -- 77 18 -- -- --   02/15/24 1845 91/66 -- -- 75 15 -- -- --   02/15/24 1745 115/81 -- -- 83 11 94 % -- --   02/15/24 1349 120/71 98.4 °F (36.9 °C) Temporal 81 16 96 % 5' 11\" (1.803 m) 184 lb (83.5 kg)       Physical Exam:   General: alert, cooperative, oriented.  No respiratory distress.   Head: Normocephalic, without obvious abnormality, atraumatic.   Eyes: Conjunctivae/corneas clear.  No scleral icterus.  No conjunctival     hemorrhage.   Nose: Nares  normal.   Throat: Lips, mucosa, and tongue normal.  No thrush noted.   Neck: Soft, supple neck; trachea midline, no adenopathy, no thyromegaly.   Lungs: CTAB, normal and equal bilateral chest rise   Chest wall: No tenderness or deformity.   Heart: Regular rate and rhythm, normal S1S2, no murmur.   Abdomen: soft, non-distended,BS+ tenderness, catheter removed no     rebound, positive BS.   Extremity: no edema, no cyanosis   Skin: No rashes or lesions.   Neurological: Alert, interactive, no focal deficits    Labs:  Lab Results   Component Value Date    WBC 3.7 02/18/2024    HGB 9.6 02/18/2024    HCT 28.1 02/18/2024    .0 02/18/2024    CREATSERUM 9.65 02/18/2024    BUN 44 02/18/2024     02/18/2024    K 4.7 02/18/2024    CL 99 02/18/2024    CO2 25.0 02/18/2024    GLU 91 02/18/2024    CA 8.7 02/18/2024    MG 2.7 02/18/2024       Radiology:  CT- 1. Peritoneal dialysis catheter along the left anterior abdominal wall where there is mild surrounding inflammatory change in fatty induration that could represent changes of cellulitis.  No drainable soft tissue abscess identified in this region by   noncontrast exam.      2. Possible sludge in the gallbladder.  There is gallbladder wall thickening and mild inflammatory changes along the gallbladder fossa tracking into the cortez hepatis raising the possibility of acute cholecystitis in the appropriate clinical setting.    Clinical correlation recommended.      3. Small amount of ascites in the abdomen and pelvis.      Please see above for further details.       Cultures:  Reviewed     Assessment and Plan:    1.  Anterior abdominal wall cellulitis around the PD Catheter:   - CT scan with mild surrounding inflammatory change in fatty induration that could represent changes of cellulitis around PD Catheter,   - Surgery input noted,   - S/P removal of PD Catheter, 2/16/24-  Cul with E coli,   - On IV Vanc and Ceftriaxone, tolerating it well, DC and start IV Cefazolin  post HD for two weeks,  to arrange,     2.   Gall bladder wall thickening with mild inflammatory changes: No abn LFTs,  - No tenderness on my exam, will follow surgery input,     3.   ESRD: On HD now, tolerating it well,     4.   Immunocompromised state: sec to ESRD, Heart Transplant,   - On Tacrolimus,     5.   Disposition: in house, an elderly male with dementia, admitted with worsening abdominal pain, sec to infected & dislodged PD Cathter, rule out biliary source in association,   - Follow Pending cul,   - DC IV Vanc and Ceftriaxone,   - Pain management per PCP,   - May plan to discharge on IV Cefazolin with HD for two weeks through 3/01/24    Discussed with RN, all questions answered, further recommendations to follow, Thanks,         Thank you for consulting [facility] for Nader Campbell.  If you have any questions or concerns please call [facility] Infectious Disease at [phone].     Nghia Simmons MD  2/16/2024  12:44 PM

## 2024-02-19 VITALS
RESPIRATION RATE: 20 BRPM | TEMPERATURE: 98 F | HEART RATE: 81 BPM | DIASTOLIC BLOOD PRESSURE: 76 MMHG | BODY MASS INDEX: 26.94 KG/M2 | HEIGHT: 71 IN | WEIGHT: 192.44 LBS | OXYGEN SATURATION: 100 % | SYSTOLIC BLOOD PRESSURE: 137 MMHG

## 2024-02-19 LAB
ANION GAP SERPL CALC-SCNC: 7 MMOL/L (ref 0–18)
BASOPHILS # BLD AUTO: 0.02 X10(3) UL (ref 0–0.2)
BASOPHILS NFR BLD AUTO: 0.5 %
BUN BLD-MCNC: 60 MG/DL (ref 9–23)
CALCIUM BLD-MCNC: 8.5 MG/DL (ref 8.5–10.1)
CHLORIDE SERPL-SCNC: 92 MMOL/L (ref 98–112)
CO2 SERPL-SCNC: 29 MMOL/L (ref 21–32)
CREAT BLD-MCNC: 11.3 MG/DL
EGFRCR SERPLBLD CKD-EPI 2021: 4 ML/MIN/1.73M2 (ref 60–?)
EOSINOPHIL # BLD AUTO: 0.31 X10(3) UL (ref 0–0.7)
EOSINOPHIL NFR BLD AUTO: 8.4 %
ERYTHROCYTE [DISTWIDTH] IN BLOOD BY AUTOMATED COUNT: 14.6 %
GLUCOSE BLD-MCNC: 113 MG/DL (ref 70–99)
HCT VFR BLD AUTO: 29.5 %
HGB BLD-MCNC: 10.5 G/DL
IMM GRANULOCYTES # BLD AUTO: 0.01 X10(3) UL (ref 0–1)
IMM GRANULOCYTES NFR BLD: 0.3 %
LYMPHOCYTES # BLD AUTO: 1.02 X10(3) UL (ref 1–4)
LYMPHOCYTES NFR BLD AUTO: 27.7 %
MAGNESIUM SERPL-MCNC: 2.8 MG/DL (ref 1.6–2.6)
MCH RBC QN AUTO: 30.3 PG (ref 26–34)
MCHC RBC AUTO-ENTMCNC: 35.6 G/DL (ref 31–37)
MCV RBC AUTO: 85 FL
MONOCYTES # BLD AUTO: 0.56 X10(3) UL (ref 0.1–1)
MONOCYTES NFR BLD AUTO: 15.2 %
NEUTROPHILS # BLD AUTO: 1.76 X10 (3) UL (ref 1.5–7.7)
NEUTROPHILS # BLD AUTO: 1.76 X10(3) UL (ref 1.5–7.7)
NEUTROPHILS NFR BLD AUTO: 47.9 %
OSMOLALITY SERPL CALC.SUM OF ELEC: 284 MOSM/KG (ref 275–295)
PLATELET # BLD AUTO: 136 10(3)UL (ref 150–450)
POTASSIUM SERPL-SCNC: 4.3 MMOL/L (ref 3.5–5.1)
RBC # BLD AUTO: 3.47 X10(6)UL
SODIUM SERPL-SCNC: 128 MMOL/L (ref 136–145)
WBC # BLD AUTO: 3.7 X10(3) UL (ref 4–11)

## 2024-02-19 PROCEDURE — 99232 SBSQ HOSP IP/OBS MODERATE 35: CPT | Performed by: INTERNAL MEDICINE

## 2024-02-19 RX ORDER — DONEPEZIL HYDROCHLORIDE 10 MG/1
10 TABLET, FILM COATED ORAL NIGHTLY
COMMUNITY

## 2024-02-19 RX ORDER — BUMETANIDE 2 MG/1
2 TABLET ORAL
COMMUNITY

## 2024-02-19 NOTE — OPERATIVE REPORT
[facility]    PATIENT'S NAME: MARJORIE ANNE   ATTENDING PHYSICIAN: Lowell Salmon MD   OPERATING PHYSICIAN: Julio Cesar Reyes M.D.   PATIENT ACCOUNT#:   [id number]    LOCATION:  [address] [place] [address]  MEDICAL RECORD #:   [id number]       YOB: 1945  ADMISSION DATE:       02/15/2024      OPERATION DATE:  02/16/2024    OPERATIVE REPORT    PREOPERATIVE DIAGNOSIS:  Malpositioned peritoneal dialysis catheter.  POSTOPERATIVE DIAGNOSIS:  Malpositioned peritoneal dialysis catheter.  PROCEDURE:  Removal of peritoneal dialysis catheter.    ASSISTANT:  OR staff.    ANESTHESIA:  General endotracheal anesthesia.    ANESTHESIOLOGIST:  Jg Nagel MD    BLOOD LOSS:  Less than 2 mL.    COMPLICATIONS:  None apparent.    SPECIMENS:  PD catheter for gross identification only.    FINDINGS:  Catheter is removed intact.    DISPOSITION:  The patient was awakened from anesthesia and brought to recovery room in stable condition having tolerated procedure without apparent complication.    COUNTS:  Needle, sponge, and instrument counts were correct at the end of procedure.    Preprocedure antibiotic and DVT prophylaxis administered per protocol, and time-out was performed prior to initiating the procedure identifying the correct patient, procedure, and surgeon.    OPERATIVE TECHNIQUE:  The patient was brought to the operating room.  After induction of general endotracheal anesthesia, the abdomen was prepped and draped in usual sterile fashion.  The area of the peritoneal dialysis catheter is prepped and draped.  Local anesthetic was injected at the level of the inner cuff and the outer cuff.  A skin incision was created over the inner cuff using a scalpel.  The catheter and cuff were readily identified, and using blunt dissection, the catheter and cuff were  from the surrounding tissues.  Then, with gentle traction from the end of the catheter, the catheter is removed with ease and intact.  The catheter was  submitted to Pathology for gross identification.  Hemostasis is confirmed in both locations.  Electrocautery was used to achieve hemostasis.  The wounds were closed using skin clips.  Local anesthetic was injected after which sterile dressings were applied.  The patient was then awakened from anesthesia and brought to the recovery room in stable condition.  He tolerated procedure without apparent complication.  Needle, sponge, and instrument counts were correct at the end procedure.  Operative findings discussed with patient's family via telephone immediately upon conclusion of surgery.    Dictated By Julio Cesar Reyes M.D.  d: 02/16/2024 19:45:19  t: 02/17/2024 02:38:04  Job [id number]  PP/

## 2024-02-19 NOTE — PROGRESS NOTES
[facility]     Nephrology Progress Note    Naedr Campbell Patient Status:  Inpatient    8/10/1945 MRN [id number]   Location [facility] 3NW-A Attending Lowell Salmon MD   Hosp Day # 4 PCP Anoop Keys MD       SUBJECTIVE:  Weekend notes reviewed        Physical Exam:   BP 92/66 (BP Location: Left arm)   Pulse 76   Temp 97.8 °F (36.6 °C) (Oral)   Resp 18   Ht 5' 11\" (1.803 m)   Wt 192 lb 7.4 oz (87.3 kg)   SpO2 94%   BMI 26.84 kg/m²   Temp (24hrs), Av.1 °F (36.7 °C), Min:97.8 °F (36.6 °C), Max:98.7 °F (37.1 °C)       Intake/Output Summary (Last 24 hours) at 2024 0743  Last data filed at 2024 0724  Gross per 24 hour   Intake 1260 ml   Output 1 ml   Net 1259 ml     Last 3 Weights   24 0133 192 lb 7.4 oz (87.3 kg)   24 0247 190 lb 4.8 oz (86.3 kg)   02/15/24 2234 192 lb 9.6 oz (87.4 kg)   02/15/24 2226 192 lb 9.6 oz (87.4 kg)   02/15/24 1349 184 lb (83.5 kg)   24 1624 186 lb (84.4 kg)   02/10/24 0401 186 lb 8.2 oz (84.6 kg)   24 1706 185 lb (83.9 kg)   24 1044 180 lb (81.6 kg)   24 0400 189 lb (85.7 kg)   24 1910 193 lb 12.8 oz (87.9 kg)   24 1047 185 lb (83.9 kg)   24 0952 186 lb 12.8 oz (84.7 kg)   24 1518 182 lb (82.6 kg)     General: Alert and in no apparent distress.  HEENT: No scleral icterus, MMM  Neck: Supple, no ROXANN or thyromegaly  Cardiac: Regular rate and rhythm, S1, S2 normal, no murmur or rub  Lungs: Clear without wheezes, rales, rhonchi.    Abdomen: Soft, + tender at surgical site. + bowel sounds  Extremities: Without clubbing, cyanosis or edema. R arm AVF with bruit/thrill  Neurologic:  moving all extremities  Skin: Warm and dry, no rash        Labs:     Recent Labs   Lab 02/15/24  1748 24  0530 24  0644 24  0548 24  0522   WBC 4.9 4.1 4.3 3.7* 3.7*   HGB 10.7* 9.9* 10.3* 9.6* 10.5*   MCV 85.8 84.0 84.4 88.6 85.0   .0* 118.0* 135.0* 137.0* 136.0*       Recent Labs   Lab  02/15/24  1748 02/15/24  1926 02/16/24  0530 02/17/24  0644 02/18/24  0548 02/19/24  0522   *  --  135* 134* 131* 128*   K 5.9* 4.8 4.5 4.9 4.7 4.3     --  99 98 99 92*   CO2 27.0  --  29.0 26.0 25.0 29.0   BUN 46*  --  54* 65* 44* 60*   CREATSERUM 10.20*  --  11.00* 12.70* 9.65* 11.30*   CA 8.5  --  8.3* 8.8 8.7 8.5   MG  --   --  2.8* 2.9* 2.7* 2.8*   GLU 92  --  88 104* 91 113*       Recent Labs   Lab 02/15/24  1748   ALT 16   AST 47*   ALB 3.1*       Recent Labs   Lab 02/16/24  1739   PGLU 84       Meds:   Current Facility-Administered Medications   Medication Dose Route Frequency    [START ON 2/20/2024] ceFAZolin (Ancef) 1 g in dextrose 5% 100mL IVPB-ADD  1 g Intravenous Once per day on Tuesday Thursday Saturday    lidocaine-menthol 4-1 % patch 1 patch  1 patch Transdermal Daily    risperiDONE (RisperDAL) tab 0.25 mg  0.25 mg Oral Daily PRN    acetaminophen (Tylenol) tab 650 mg  650 mg Oral Q6H PRN    ibuprofen (Motrin) tab 400 mg  400 mg Oral Q6H PRN    allopurinol (Zyloprim) tab 50 mg  50 mg Oral Daily    gabapentin (Neurontin) cap 100 mg  100 mg Oral Q Tu, Th and Sa    memantine (Namenda) tab 20 mg  20 mg Oral Nightly    pravastatin (Pravachol) tab 20 mg  20 mg Oral Nightly    risperiDONE (RisperDAL) tab 0.5 mg  0.5 mg Oral Nightly    sevelamer carbonate (Renvela) tab 800 mg  800 mg Oral TID CC    tacrolimus (Prograf) cap 0.5 mg  0.5 mg Oral BID    tacrolimus (Prograf) cap 1 mg  1 mg Oral QAM         Impression/Plan:        1.  Dislodged Tenckhoff catheter- appreciate surgery eval.  S/p Removal. He is not a candidate for CCPD moving forward due to dementia.     2.  ESRD- HD to cont per usual TTHS schedule ; pt can get ancef at HD x 2 wks (post HD through 3/1/24 - 1g on Tues/Thurs, 2 g on Saturday)     3.  Anemia- due to ESRD.  Cont ERVIN for goal hgb 10-11 gms     4.  Heart transplant- on tacro     5. Dementia- on namenda    No objection to home from nephrology standpoint      Miguel Hansen,  MD  2/19/2024  7:43 AM

## 2024-02-19 NOTE — PROGRESS NOTES
Pt did not sleep all night. Increasing agitation to go home. Redirectable but forgetful. Assisted with bathing, dressing, and to restroom. Walker, gait belt- unsteady gait. Called spouse at home 4 am; informed RN pt has nights of complete insomnia and she typical will give a double dose of (Risperdal) in tea as pt will not take it. Spouse spoke to pt, calmer after speaking to pt.     0605 Pt yelling out.

## 2024-02-19 NOTE — CM/SW NOTE
Spoke with Yasmin at the [facility] who said to fax over the IV ABX script to [phone].  IV ABX script sent to [facility].  Pt is ready for dc today - pt will continue with [facility].  Judy from [facility] aware that pt will dc home today.

## 2024-02-19 NOTE — PLAN OF CARE
Resumed care at 0730. Aox1, re-oriented. Posey vest on, rails up. Ambulated to bathroom, OOB in chair with chair alarm on. Restraints removed. Ok to discharge per all services. Wife at bedside for discharge paperwork. Discharge instructions given and explained to wife, verbalized understanding. Patient dressed, no IV to remove. Wheelchair to lobby, family to give ride home. Discharged with all belongings.

## 2024-02-19 NOTE — PROGRESS NOTES
[facility] Hospitalist Progress Note                                                                     [facility]  8/10/1945    SUBJECTIVE: Pt with dementia, No complaints     OBJECTIVE:  Temp:  [97.8 °F (36.6 °C)-98.7 °F (37.1 °C)] 98.2 °F (36.8 °C)  Pulse:  [75-83] 81  Resp:  [16-20] 20  BP: ()/(60-76) 137/76  SpO2:  [93 %-100 %] 100 %  Exam  Gen: No acute distress, alert  Pulm: Lungs clear bilaterally, normal respiratory effort, no crackles, no wheezing  CV: Heart with regular rate and rhythm, no murmur  Abd: Abdomen soft, tender at the previous PD site, nondistended, bowel sounds present,   MSK: No significant pitting edema or tenderness of the LE  Skin: no new rashes or lesions    Labs:   Recent Labs   Lab 02/15/24  1748 02/16/24  0530 02/17/24  0644 02/18/24  0548 02/19/24  0522   WBC 4.9 4.1 4.3 3.7* 3.7*   HGB 10.7* 9.9* 10.3* 9.6* 10.5*   MCV 85.8 84.0 84.4 88.6 85.0   .0* 118.0* 135.0* 137.0* 136.0*       Recent Labs   Lab 02/15/24  1748 02/15/24  1926 02/16/24  0530 02/17/24  0644 02/18/24  0548 02/19/24  0522   *  --  135* 134* 131* 128*   K 5.9* 4.8 4.5 4.9 4.7 4.3     --  99 98 99 92*   CO2 27.0  --  29.0 26.0 25.0 29.0   BUN 46*  --  54* 65* 44* 60*   CREATSERUM 10.20*  --  11.00* 12.70* 9.65* 11.30*   CA 8.5  --  8.3* 8.8 8.7 8.5   MG  --   --  2.8* 2.9* 2.7* 2.8*   GLU 92  --  88 104* 91 113*       Recent Labs   Lab 02/15/24  1748   ALT 16   AST 47*   ALB 3.1*       Recent Labs   Lab 02/16/24  1739   PGLU 84       Meds:   Scheduled:    [START ON 2/20/2024] ceFAZolin  1 g Intravenous Once per day on Tuesday Thursday Saturday    lidocaine-menthol  1 patch Transdermal Daily    allopurinol  50 mg Oral Daily    gabapentin  100 mg Oral Q Tu, Th and Sa    memantine  20 mg Oral Nightly    pravastatin  20 mg Oral Nightly    risperiDONE  0.5 mg Oral Nightly    sevelamer carbonate  800 mg Oral TID CC    tacrolimus   0.5 mg Oral BID    tacrolimus  1 mg Oral QAM     Continuous Infusions:   PRN: risperiDONE, acetaminophen, ibuprofen    ASSESSMENT / PLAN:   78 year old male with history of BPH, CKD, congestive heart failure with history of heart transplant, hemodialysis, GERD, glaucoma, gout, hypertension, hyperlipidemia, obstructive sleep apnea presenting with PD catheter pain.     PD catheter pain  -surgery consulted  -POD # 3 s/p removal of PD catheter     Cellulitis  -concern for cellulitis at PD catheter site  -IV ceftriaxone--> iv cefazolin with HD for 2 weeks through 3/1  -ID following--> ok to dc once above can be set up     ESRD  -HD per renal     Dementia  -namenda  -risperidone     HLD  -pravastatin      Hx Heart transplant  -tacrolimus      Gout  -allopurinol      Quality:  DVT Prophylaxis: scd  CODE status: DNR comfort per chart  Pozo:      Plan of care discussed with patient and staff     Dispo: discharge when abx set up      Lowell Salmon MD  [facility]  [phone]

## 2024-02-19 NOTE — PROGRESS NOTES
[facility]    [facility] Progress Note    Nader Campbell Patient Status:  Inpatient    8/10/1945 MRN [id number]   [facility] 3NW-A Attending Lowell Salmon MD   Hosp Day # 4 PCP Anoop Keys MD     Subjective:  Pt with no new complaints.     Objective:    Allergies:  Allergies   Allergen Reactions    Amlodipine OTHER (SEE COMMENTS) and SWELLING     Leg swelling    Dalteparin HIVES and RASH     Fragmin      Lisinopril OTHER (SEE COMMENTS)     Hyperkalemia     Penicillins HIVES, RASH, SWELLING and SHORTNESS OF BREATH    Oxycodone OTHER (SEE COMMENTS)     Lost his taste and wt loss       Medications:    Current Facility-Administered Medications:     [START ON 2024] ceFAZolin (Ancef) 1 g in dextrose 5% 100mL IVPB-ADD, 1 g, Intravenous, Once per day on     lidocaine-menthol 4-1 % patch 1 patch, 1 patch, Transdermal, Daily    risperiDONE (RisperDAL) tab 0.25 mg, 0.25 mg, Oral, Daily PRN    acetaminophen (Tylenol) tab 650 mg, 650 mg, Oral, Q6H PRN    ibuprofen (Motrin) tab 400 mg, 400 mg, Oral, Q6H PRN    allopurinol (Zyloprim) tab 50 mg, 50 mg, Oral, Daily    gabapentin (Neurontin) cap 100 mg, 100 mg, Oral, Q ,  and Sa    memantine (Namenda) tab 20 mg, 20 mg, Oral, Nightly    pravastatin (Pravachol) tab 20 mg, 20 mg, Oral, Nightly    risperiDONE (RisperDAL) tab 0.5 mg, 0.5 mg, Oral, Nightly    sevelamer carbonate (Renvela) tab 800 mg, 800 mg, Oral, TID CC    tacrolimus (Prograf) cap 0.5 mg, 0.5 mg, Oral, BID    tacrolimus (Prograf) cap 1 mg, 1 mg, Oral, QAM    Physical Exam:  General: Alert, orientated x3.  Cooperative.  No apparent distress.  Vital Signs:  Blood pressure 137/76, pulse 81, temperature 98.2 °F (36.8 °C), temperature source Oral, resp. rate 20, height 5' 11\" (1.803 m), weight 192 lb 7.4 oz (87.3 kg), SpO2 100%.   Temp (24hrs), Av.2 °F (36.8 °C), Min:97.8 °F (36.6 °C), Max:98.7 °F (37.1 °C)      HEENT: Exam is unremarkable.   Without scleral icterus.  Mucous membranes are moist. PERRLA.  Oropharynx is clear.  Neck: No tenderness to palpitation.  Full range of motion to flexion and extension, lateral rotation and lateral flexion of cervical spine.  No JVD. Supple.   Lungs: Clear to auscultation bilaterally.  Cardiac: Regular rate and rhythm. No murmur.  Abdomen:  Soft, non-distended, non-tender, with no rebound or guarding.   Extremities:  No lower extremity edema noted.  Without clubbing or cyanosis.    Skin: Old PD cath site appears c/d/I, staples in place, non-tender  Neurologic: Cranial nerves are grossly intact.  Motor strength and sensory examination is grossly normal.  No focal neurologic deficit.    Labs:  Lab Results   Component Value Date    WBC 3.7 02/19/2024    HGB 10.5 02/19/2024    HCT 29.5 02/19/2024    .0 02/19/2024    CREATSERUM 11.30 02/19/2024    BUN 60 02/19/2024     02/19/2024    K 4.3 02/19/2024    CL 92 02/19/2024    CO2 29.0 02/19/2024     02/19/2024    CA 8.5 02/19/2024    MG 2.8 02/19/2024         Assessment/Plan:    1.  Abd wall cellulitis around PD cath  -CT with cellulitis  -s/p removal of line on 2/16, cultures with e coli  -on IV cefazolin  2.  ESRD on HD  3.  Immucompromised  -hx of heart transplant  4.  Dispo  -IV cefazolin with HD through 3/1    If you have any questions or concerns please call [facility] Infectious Disease at [phone].     Otoniel Jones, APRN

## 2024-02-19 NOTE — PLAN OF CARE
Pt is alert and oriented x1-2, forgetful.  VSS.  Maintaining o2 sats on r/a.  Declines tele.  Pt is on dialysis, anuric.  No BM this shift.  No c/o pain at this time.  Soft wrist restraints and posey in place, will assess throughout shift if still needed.  Safety precautions in place.  Pt updated on poc.  All questions and concerns addressed at this time.

## 2024-02-19 NOTE — PAYOR COMM NOTE
ASKING FOR RECONSIDERATION INPT    ADMISSION REVIEW     Payor: [employer] ADV PPO  Subscriber #:  [id number]  Authorization Number: [id number]    Admit date: 2/15/24  Admit time: 10:22 PM       REVIEW DOCUMENTATION:     ED Provider Notes        ED Provider Notes signed by Shahla Simon MD at 2/15/2024  8:54 PM       Author: Shahla Simon MD Service: -- Author Type: Physician    Filed: 2/15/2024  8:54 PM Date of Service: 2/15/2024  5:23 PM Status: Signed    : Shahla Simon MD (Physician)           Patient Seen in: [facility] Emergency Department      History     Chief Complaint   Patient presents with    Cath Tube Problem     Stated Complaint: pulled dialysis catheter, dementia, recent dc from inpatient last saturday    Subjective:   HPI    78-year-old male with a past medical history as below including dementia, ESRD, hypertension, CHF status post heart transplant brought by wife due to pain and bloody drainage from wound left abdominal PD catheter.  She states catheter was placed last month to transition him from hemodialysis to peritoneal.  Wife states patient was admitted last week after passing out and the catheter was noted to be partially dislodged at that time but cannot be removed at the bedside.  She states she is scheduled to have it removed next month but it has been causing him a lot of pain and needs it removed sooner.  She is concerned for infection with some bleeding that she noted over the past few days.  She denies fever, vomiting or diarrhea.  Patient last received hemodialysis yesterday.  Patient unable to provide any significant history due to dementia.    Objective:   Past Medical History:   Diagnosis Date    Back problem     BPH (benign prostatic hyperplasia)     Cataract     CKD (chronic kidney disease)     Congestive heart disease (HCC)     before transplant in 2006    Dialysis patient (HCC) 07/27/2023    HEMODIALYSIS TUES/THURS/SAT 3.5HRS    Esophageal reflux     Fistula      RIGHT ARM    Glaucoma     Gout     Hearing impairment     HA's x2, doesnt wear    Heart transplanted (HCC) 2006 2006-@ [facility]    High blood pressure     High cholesterol     Hypercholesterolemia     Muscle weakness     uses a cane- balance issues    Obstructive sleep apnea syndrome 02/04/2009    Problems with swallowing     Renal disorder     CKD    Short-term memory loss     Shortness of breath     Sleep apnea     had UPPP did not need device after    Visual impairment     glasses              No pertinent past surgical history.              No pertinent social history.            Review of Systems    Positive for stated complaint: pulled dialysis catheter, dementia, recent dc from inpatient last saturday  Other systems are as noted in HPI.  Constitutional and vital signs reviewed.      All other systems reviewed and negative except as noted above.    Physical Exam     ED Triage Vitals [02/15/24 1349]   /71   Pulse 81   Resp 16   Temp 98.4 °F (36.9 °C)   Temp src Temporal   SpO2 96 %   O2 Device None (Room air)       Current:/79   Pulse 76   Temp 98.4 °F (36.9 °C) (Temporal)   Resp 14   Ht 180.3 cm (5' 11\")   Wt 83.5 kg   SpO2 94%   BMI 25.66 kg/m²         Physical Exam  Vitals and nursing note reviewed.   Constitutional:       General: He is not in acute distress.     Appearance: He is well-developed. He is not ill-appearing.   HENT:      Head: Normocephalic and atraumatic.      Mouth/Throat:      Mouth: Mucous membranes are moist.   Eyes:      General: No scleral icterus.     Extraocular Movements: Extraocular movements intact.   Cardiovascular:      Rate and Rhythm: Normal rate and regular rhythm.   Pulmonary:      Effort: Pulmonary effort is normal.      Breath sounds: Normal breath sounds.   Abdominal:      General: There is no distension.      Tenderness: There is abdominal tenderness. There is no guarding or rebound.      Comments: Left abdominal PD catheter site with mild  edema and tenderness with some yellow exudate at the stoma   Musculoskeletal:      Cervical back: Neck supple.   Skin:     General: Skin is warm and dry.      Capillary Refill: Capillary refill takes less than 2 seconds.   Neurological:      Mental Status: He is alert.      GCS: GCS eye subscore is 4. GCS verbal subscore is 5. GCS motor subscore is 6.   Psychiatric:         Mood and Affect: Mood normal.         Behavior: Behavior normal.               ED Course     Labs Reviewed   COMP METABOLIC PANEL (14) - Abnormal; Notable for the following components:       Result Value    Sodium 132 (*)     Potassium 5.9 (*)     BUN 46 (*)     Creatinine 10.20 (*)     eGFR-Cr 5 (*)     AST 47 (*)     Alkaline Phosphatase 217 (*)     Albumin 3.1 (*)     Globulin  4.7 (*)     A/G Ratio 0.7 (*)     All other components within normal limits   CBC W/ DIFFERENTIAL - Abnormal; Notable for the following components:    RBC 3.59 (*)     HGB 10.7 (*)     HCT 30.8 (*)     .0 (*)     All other components within normal limits   POTASSIUM - Normal   CBC WITH DIFFERENTIAL WITH PLATELET    Narrative:     The following orders were created for panel order CBC With Differential With Platelet.  Procedure                               Abnormality         Status                     ---------                               -----------         ------                     CBC W/ DIFFERENTIAL[[id number]]          Abnormal            Final result                 Please view results for these tests on the individual orders.   RAINBOW DRAW LAVENDER   RAINBOW DRAW LIGHT GREEN   RAINBOW DRAW BLUE             CT ABDOMEN+PELVIS(CPT=74176)    Result Date: 2/15/2024  CONCLUSION:   1. Peritoneal dialysis catheter along the left anterior abdominal wall where there is mild surrounding inflammatory change in fatty induration that could represent changes of cellulitis.  No drainable soft tissue abscess identified in this region by noncontrast exam.  2. Possible  sludge in the gallbladder.  There is gallbladder wall thickening and mild inflammatory changes along the gallbladder fossa tracking into the cortez hepatis raising the possibility of acute cholecystitis in the appropriate clinical setting.  Clinical correlation recommended.  3. Small amount of ascites in the abdomen and pelvis.  Please see above for further details.  LOCATION:  [id number]   Dictated by (CST): Gui Harris MD on 2/15/2024 at 7:08 PM     Finalized by (CST): Gui Harris MD on 2/15/2024 at 7:14 PM              MDM   78-year-old male with a past medical history as below including dementia, ESRD, hypertension, CHF status post heart transplant brought by wife due to pain and bloody drainage from wound left abdominal PD catheter.      Differential includes but is not limited to PD catheter dislodgment, cellulitis, abscess    Labs show ESRD with normal electrolytes.  WBC is normal without left shift.    Independent interpretation of CT abdomen/pelvis shows no abscess but does show some inflammatory changes.  Radiology report reviewed as above noting inflammatory changes could represent cellulitis but no drainable soft tissue abscess.    Will admit for further management and removal.  Removal of PD catheter.Patient treated with a dose of ceftriaxone.    Discussed with general surgery Dr. Pike.  Discussed with hospitalist Dr. Frias,      Medical Decision Making  Amount and/or Complexity of Data Reviewed  Independent Historian: spouse  Labs: ordered. Decision-making details documented in ED Course.  Radiology: ordered and independent interpretation performed. Decision-making details documented in ED Course.  Discussion of management or test interpretation with external provider(s): Hospitalist, general surgery    Risk  Decision regarding hospitalization.        Disposition and Plan     Clinical Impression:  1. Abdominal wall cellulitis    2. Peritoneal dialysis catheter exit site infection, initial  encounter (HCC)    3. Mechanical complication of peritoneal dialysis catheter, initial encounter (HCC)    4. ESRD (end stage renal disease) on dialysis (HCC)         Disposition:  Admit  2/15/2024  8:17 pm    Follow-up:  No follow-up provider specified.        Medications Prescribed:  Current Discharge Medication List                            Hospital Problems       Present on Admission  Date Reviewed: 1/30/2024            ICD-10-CM Noted POA    * (Principal) Abdominal wall cellulitis L03.311 2/15/2024 Unknown                     Signed by Shahla Simon MD on 2/15/2024  8:54 PM         MEDICATIONS ADMINISTERED IN LAST 1 DAY:  acetaminophen (Tylenol) tab 650 mg       Date Action Dose Route User    2/19/2024 0142 Given 650 mg Oral Magda Haynes RN          allopurinol (Zyloprim) tab 50 mg       Date Action Dose Route User    2/18/2024 0949 Given 50 mg Oral Edwina Guzman RN          ibuprofen (Motrin) tab 400 mg       Date Action Dose Route User    2/19/2024 0500 Given 400 mg Oral Beatrice Cespedes RN          lidocaine-menthol 4-1 % patch 1 patch       Date Action Dose Route User    2/18/2024 1232 Patch Applied 1 patch Transdermal (Right Upper Arm) Edwina Guzman RN          memantine (Namenda) tab 20 mg       Date Action Dose Route User    2/18/2024 2008 Given 20 mg Oral Magda Haynes RN          pravastatin (Pravachol) tab 20 mg       Date Action Dose Route User    2/18/2024 2007 Given 20 mg Oral Magda Haynes RN          risperiDONE (RisperDAL) tab 0.5 mg       Date Action Dose Route User    2/18/2024 2008 Given 0.5 mg Oral Magda Haynes RN          sevelamer carbonate (Renvela) tab 800 mg       Date Action Dose Route User    2/18/2024 1851 Given 800 mg Oral Edwina Guzman RN    2/18/2024 1232 Given 800 mg Oral Edwina Guzman RN    2/18/2024 0947 Given 800 mg Oral Edwina Guzman RN          tacrolimus (Prograf) cap 0.5 mg       Date Action Dose Route User    2/18/2024 2008 Given 0.5 mg Oral  Magda Haynes, RN    2/18/2024 0948 Given 0.5 mg Oral Edwina Guzman RN          tacrolimus (Prograf) cap 1 mg       Date Action Dose Route User    2/18/2024 0953 Given 1 mg Oral Edwina Guzman RN            Vitals (last day)       Date/Time Temp Pulse Resp BP SpO2 Weight O2 Device O2 Flow Rate (L/min) [facility]    02/19/24 0834 98.2 °F (36.8 °C) 81 20 137/76 100 % -- None (Room air) -- DG    02/19/24 0330 97.8 °F (36.6 °C) 76 18 92/66 94 % -- None (Room air) -- ED    02/18/24 2331 98.7 °F (37.1 °C) 83 16 102/66 96 % -- None (Room air) -- ED    02/18/24 1954 97.9 °F (36.6 °C) 80 16 108/68 93 % -- None (Room air) 0 L/min ED    02/18/24 1603 97.9 °F (36.6 °C) 75 16 99/63 96 % -- None (Room air) -- JF    02/18/24 1156 98.4 °F (36.9 °C) 79 18 106/60 93 % -- None (Room air) -- JF    02/18/24 0739 98.5 °F (36.9 °C) 77 18 111/76 94 % -- None (Room air) -- JF    02/18/24 0600 97.9 °F (36.6 °C) 78 16 107/65 96 % -- None (Room air) -- [facility]    02/18/24 0133 -- -- -- -- -- 192 lb 7.4 oz -- -- EA       2/16 H&P    Chief Complaint: PD catheter Pain      History of Present Illness: Nader Campbell is a 78 year old male with history of BPH, CKD, congestive heart failure with history of heart transplant, hemodialysis, GERD, glaucoma, gout, hypertension, hyperlipidemia, obstructive sleep apnea presenting with PD catheter pain.  Appears the patient had a PD catheter placed a month ago to transition from hemodialysis to peritoneal dialysis.  Appears that the catheter has been out of place.  Has not been removed yet.  There is plan to have it removed in the next few weeks as an outpatient.  Appears that patient at home has been having more pain at the catheter site and there is concern for infection result patient was brought to the emergency room for further evaluation and treatment.  Patient has dementia and is unable to give me a significant history or review of systems.      Physical Exam:    BP (!) 129/93 (BP Location: Left arm)    Pulse 92   Temp 97.8 °F (36.6 °C) (Oral)   Resp 16   Ht 5' 11\" (1.803 m)   Wt 190 lb 4.8 oz (86.3 kg)   SpO2 96%   BMI 26.54 kg/m²     Recent Labs   Lab 02/09/24  1047 02/10/24  0604 02/15/24  1748 02/16/24  0530   WBC 5.1 4.9 4.9 4.1   HGB 10.9* 10.1* 10.7* 9.9*   MCV 85.5 86.8 85.8 84.0   .0* 95.0* 128.0* 118.0*                Recent Labs   Lab 02/10/24  0604 02/15/24  1748 02/15/24  1926 02/16/24  0530   GLU 96 92  --  88   BUN 44* 46*  --  54*   CREATSERUM 10.10* 10.20*  --  11.00*   CA 8.2* 8.5  --  8.3*   ALB 3.2* 3.1*  --   --     132*  --  135*   K 4.7 5.9* 4.8 4.5   CL 97* 100  --  99   CO2 31.0 27.0  --  29.0   ALKPHO 185* 217*  --   --    AST 18 47*  --   --    ALT 9* 16  --   --    BILT 0.7 1.1  --   --    TP 7.0 7.8  --   --            ASSESSMENT / PLAN:   78 year old male with history of BPH, CKD, congestive heart failure with history of heart transplant, hemodialysis, GERD, glaucoma, gout, hypertension, hyperlipidemia, obstructive sleep apnea presenting with PD catheter pain.     PD catheter pain  -surgery consulted  -plan for OR today for removal of PD catheter     Cellulitis  -concern for cellulitis at PD catheter site  -IV ceftriaxone  -ID consulted     ESRD  -HD per renal consulted     Dementia  -namenda  -risperidone     HLD  -pravastatin      Hx Heart transplant  -tacrolimus      Gout  -allopurinol      Quality:  DVT Prophylaxis: scd  CODE status: DNR comfort per chart  Pozo:       2/16 NEPHROLOGY CONSULT NOTE    Reason for Consultation:  ESRD     History of Present Illness:  Nader Campbell is a a(n) 78 year old demented man known to our service with mult med probs incl ESRD 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  Unfortunately he has been pulling at [facility] and has had bloody drainage with pain/erythema at exit site.                Impression/Plan:     #1.  Dislodged Tenckhoff catheter- appreciate surgery eval.  Plan for removal today.  It would appear that CCPD will not be an option moving forward.  On abx- ceftriaxone     #2.  ESRD- HD to cont per usual TTHS schedule     #3.  Anemia- due to ESRD.  Cont ERVIN for goal hgb 10-11 gms     #4.  Heart transplant- on tacro     2/16 ID CONSULT NOTE    Reason for Consultation:  To help with infection and treatment, requested by Dr. Salmon,      History of Present Illness:  Nader Campbell is a 78 year old male admitted to [facility] on 2/15 with Pain and bleeding at PD Cathter site,   Significant hx of   - Dementia,   - ESRD, has been on HD, Plan is to transition to PD.   - Heart Transplant,   Had PD Catheter inserted a month ago, which apparently got dislodged with plan for removal in next few weeks, As the pain at the site of cathter worsened, he also had bleeding from the site, infection was suspected and Nader was instructed to come to the hospital,   Nephrology on board, ID is asked to help with infection and treatment,   Patient has his eyes closed, does not answer questions,       Radiology:  CT- 1. Peritoneal dialysis catheter along the left anterior abdominal wall where there is mild surrounding inflammatory change in fatty induration that could represent changes of cellulitis.  No drainable soft tissue abscess identified in this region by   noncontrast exam.      2. Possible sludge in the gallbladder.  There is gallbladder wall thickening and mild inflammatory changes along the gallbladder fossa tracking into the cortez hepatis raising the possibility of acute cholecystitis in the appropriate clinical setting.    Clinical correlation recommended.      3. Small amount of ascites in the abdomen and pelvis.        Assessment and Plan:     1.  Anterior abdominal wall cellulitis around the PD Catheter:   - No drainage noted, bleeding is there,   - CT scan with mild surrounding inflammatory change  in fatty induration that could represent changes of cellulitis around PD Catheter,   - Surgery input for the removal of catheter,      2.   Gall bladder wall thickening with mild inflammatory changes: No abn LFTs,  - No tenderness on my exam, will follow surgery input,      3.   ESRD: On HD now,   - Follow Blood cul,      4.   Immunocompromised state: sec to ESRD, Heart Transplant,   - On Tacrolimus,      5.   Disposition: in house, an elderly male with dementia, admitted with worsening abdominal pain, sec to infected dislodged PD Cathter, rule out biliary source in association,   - Follow Pending cul,   - Continue IV Ceftriaxone, will add IV Vanc,   - Drainage Cul if any,   - Pain management per PCP,     2/16 OP NOTE  ADMISSION DATE:       02/15/2024      OPERATION DATE:  02/16/2024     OPERATIVE REPORT     PREOPERATIVE DIAGNOSIS:  Malpositioned peritoneal dialysis catheter.  POSTOPERATIVE DIAGNOSIS:  Malpositioned peritoneal dialysis catheter.  PROCEDURE:  Removal of peritoneal dialysis catheter.       2/17 HOSPITALIST NOTE    OBJECTIVE:  Temp:  [97.5 °F (36.4 °C)-98 °F (36.7 °C)] 97.8 °F (36.6 °C)  Pulse:  [75-91] 83  Resp:  [13-18] 18  BP: ()/(57-89) 110/73  SpO2:  [93 %-99 %] 97 %  Exam  Gen: No acute distress, alert  Pulm: Lungs clear bilaterally, normal respiratory effort, no crackles, no wheezing  CV: Heart with regular rate and rhythm, no murmur  Abd: Abdomen soft, tender at the previous PD site, nondistended, bowel sounds present,     Recent Labs   Lab 02/15/24  1748 02/16/24  0530 02/17/24  0644   WBC 4.9 4.1 4.3   HGB 10.7* 9.9* 10.3*   MCV 85.8 84.0 84.4   .0* 118.0* 135.0*                Recent Labs   Lab 02/15/24  1748 02/15/24  1926 02/16/24  0530 02/17/24  0644   *  --  135* 134*   K 5.9* 4.8 4.5 4.9     --  99 98   CO2 27.0  --  29.0 26.0   BUN 46*  --  54* 65*   CREATSERUM 10.20*  --  11.00* 12.70*   CA 8.5  --  8.3* 8.8   MG  --   --  2.8* 2.9*   GLU 92  --  88 104*        Meds:   Scheduled:    epoetin jevon  10,000 Units Intravenous Once in dialysis    Vancomycin IV  1 each Intravenous See Admin Instructions (RX holding)    allopurinol  50 mg Oral Daily    gabapentin  100 mg Oral Q ,  and Sa    memantine  20 mg Oral Nightly    pravastatin  20 mg Oral Nightly    risperiDONE  0.5 mg Oral Nightly    sevelamer carbonate  800 mg Oral TID CC    tacrolimus  0.5 mg Oral BID    tacrolimus  1 mg Oral QAM    cefTRIAXone  1 g Intravenous Q24H     ASSESSMENT / PLAN:   78 year old male with history of BPH, CKD, congestive heart failure with history of heart transplant, hemodialysis, GERD, glaucoma, gout, hypertension, hyperlipidemia, obstructive sleep apnea presenting with PD catheter pain.     PD catheter pain  -surgery consulted  -POD # 1 s/p removal of PD catheter     Cellulitis  -concern for cellulitis at PD catheter site  -IV ceftriaxone  -ID following     ESRD  -HD per renal consulted     Dementia  -namenda  -risperidone     HLD  -pravastatin      Hx Heart transplant  -tacrolimus      Gout  -allopurinol      NEPHROLOGY NOTE    Seen during HD  Restrained   Pt is frustrated about being in restraints  - care reveiwed w/ RN           Physical Exam:   /83 (BP Location: Left leg)   Pulse 82   Temp 97.8 °F (36.6 °C) (Oral)   Resp 16   Ht 5' 11\" (1.803 m)   Wt 190 lb 4.8 oz (86.3 kg)   SpO2 97%   BMI 26.54 kg/m²   Temp (24hrs), Av.8 °F (36.6 °C), Min:97.6 °F (36.4 °C), Max:98 °F (36.7 °C)        Intake/Output Summary (Last 24 hours) at 2024 1008  Last data filed at 2024 0848      Gross per 24 hour   Intake 590 ml   Output 2 ml   Net 588 ml          Impression/Plan:     1.  Dislodged Tenckhoff catheter- appreciate surgery eval.  S/p Removal. HE is not a candidate for CCPD moving forward due to dementia.     2.  ESRD- HD to cont per usual TTHS schedule- tx today      3.  Anemia- due to ESRD.  Cont ERVIN for goal hgb 10-11 gms     4.  Heart transplant- on  tacro    2/17 GEN SURGERY NOTE  Subjective:  The patient was seen and examined at bedside during hemodialysis.  He is restrained.  He is frustrated that he is restrained.  He denies abdominal pain.  He denies nausea or vomiting.  He is unaware that he had surgery yesterday.     Vital signs are stable.  He is afebrile.     Objective/Physical Exam:  /83 (BP Location: Left leg)   Pulse 82   Temp 97.8 °F (36.6 °C) (Oral)   Resp 16   Ht 71\"   Wt 190 lb 4.8 oz (86.3 kg)   SpO2 97%   BMI 26.54 kg/m²      Intake/Output Summary (Last 24 hours) at 2/17/2024 1115  Last data filed at 2/17/2024 0848      Gross per 24 hour   Intake 590 ml   Output 2 ml   Net 588 ml       POD 1 removal of infected PD catheter     Plan:  The patient appears to be doing well postoperatively.    General diet  Antiemetics and analgesics as needed  Medical management per primary  The patient should follow-up with [facility] General surgery in 10 to 14 days for staple removal  General surgery will sign off     2/17 ID NOTE    Assessment and Plan:     1.  Anterior abdominal wall cellulitis around the PD Catheter:   - CT scan with mild surrounding inflammatory change in fatty induration that could represent changes of cellulitis around PD Catheter,   - Surgery input noted,   - S/P removal of PD Catheter, 2/16/24  - Cul with E coli,   - On IV Vanc and Ceftriaxone, tolerating it well,      2.   Gall bladder wall thickening with mild inflammatory changes: No abn LFTs,  - No tenderness on my exam, will follow surgery input,      3.   ESRD: On HD now,   - Follow Blood cul,      4.   Immunocompromised state: sec to ESRD, Heart Transplant,   - On Tacrolimus,      5.   Disposition: in house, an elderly male with dementia, admitted with worsening abdominal pain, sec to infected dislodged PD Cathter, rule out biliary source in association,   - Follow Pending cul,   - Continue IV Ceftriaxone, IV Vanc, pharm to dose,   - Pain management per PCP,      2/18 ID  NOTE  ications:     Current Facility-Administered Medications:     risperiDONE (RisperDAL) tab 0.25 mg, 0.25 mg, Oral, Daily PRN    sodium chloride 0.9 % IV bolus 100 mL, 100 mL, Intravenous, Q30 Min PRN **AND** albumin human (Albumin) 25% injection 25 g, 25 g, Intravenous, PRN Dialysis    Vancomycin: PHARMACY DOSING, 1 each, Intravenous, See Admin Instructions (RX holding)    acetaminophen (Tylenol) tab 650 mg, 650 mg, Oral, Q6H PRN    ibuprofen (Motrin) tab 400 mg, 400 mg, Oral, Q6H PRN    allopurinol (Zyloprim) tab 50 mg, 50 mg, Oral, Daily    gabapentin (Neurontin) cap 100 mg, 100 mg, Oral, Q Tu, Th and Sa    memantine (Namenda) tab 20 mg, 20 mg, Oral, Nightly    pravastatin (Pravachol) tab 20 mg, 20 mg, Oral, Nightly    risperiDONE (RisperDAL) tab 0.5 mg, 0.5 mg, Oral, Nightly    sevelamer carbonate (Renvela) tab 800 mg, 800 mg, Oral, TID CC    tacrolimus (Prograf) cap 0.5 mg, 0.5 mg, Oral, BID    tacrolimus (Prograf) cap 1 mg, 1 mg, Oral, QAM    cefTRIAXone (Rocephin) 1 g in D5W 100 mL IVPB-ADD, 1 g, Intravenous, Q24H    Lab Results   Component Value Date     WBC 3.7 02/18/2024     HGB 9.6 02/18/2024     HCT 28.1 02/18/2024     .0 02/18/2024     CREATSERUM 9.65 02/18/2024     BUN 44 02/18/2024      02/18/2024     K 4.7 02/18/2024     CL 99 02/18/2024     CO2 25.0 02/18/2024     GLU 91 02/18/2024     CA 8.7 02/18/2024     MG 2.7 02/18/2024       Assessment and Plan:     1.  Anterior abdominal wall cellulitis around the PD Catheter:   - CT scan with mild surrounding inflammatory change in fatty induration that could represent changes of cellulitis around PD Catheter,   - Surgery input noted,   - S/P removal of PD Catheter, 2/16/24-  Cul with E coli,   - On IV Vanc and Ceftriaxone, tolerating it well, DC and start IV Cefazolin post HD for two weeks,  to arrange,      2.   Gall bladder wall thickening with mild inflammatory changes: No abn LFTs,  - No tenderness on my exam, will follow  surgery input,      3.   ESRD: On HD now, tolerating it well,      4.   Immunocompromised state: sec to ESRD, Heart Transplant,   - On Tacrolimus,      5.   Disposition: in house, an elderly male with dementia, admitted with worsening abdominal pain, sec to infected & dislodged PD Cathter, rule out biliary source in association,   - Follow Pending cul,   - DC IV Vanc and Ceftriaxone,   - Pain management per PCP,   - May plan to discharge on IV Cefazolin with HD for two weeks through 3/01/24    2/18 HOSPITALIST NOTE    OBJECTIVE:  Temp:  [97.6 °F (36.4 °C)-98.5 °F (36.9 °C)] 98.5 °F (36.9 °C)  Pulse:  [77-89] 77  Resp:  [16-18] 18  BP: (107-137)/(65-83) 111/76  SpO2:  [94 %-98 %] 94 %    Recent Labs   Lab 02/15/24  1748 02/16/24  0530 02/17/24  0644 02/18/24  0548   WBC 4.9 4.1 4.3 3.7*   HGB 10.7* 9.9* 10.3* 9.6*   MCV 85.8 84.0 84.4 88.6   .0* 118.0* 135.0* 137.0*                 Recent Labs   Lab 02/15/24  1748 02/15/24  1926 02/16/24  0530 02/17/24  0644 02/18/24  0548   *  --  135* 134* 131*   K 5.9* 4.8 4.5 4.9 4.7     --  99 98 99   CO2 27.0  --  29.0 26.0 25.0   BUN 46*  --  54* 65* 44*   CREATSERUM 10.20*  --  11.00* 12.70* 9.65*   CA 8.5  --  8.3* 8.8 8.7   MG  --   --  2.8* 2.9* 2.7*   GLU 92  --  88 104* 91       Meds:   Scheduled:    [START ON 2/20/2024] ceFAZolin  1 g Intravenous Once per day on Tuesday Thursday Saturday    allopurinol  50 mg Oral Daily    gabapentin  100 mg Oral Q Tu, Th and Sa    memantine  20 mg Oral Nightly    pravastatin  20 mg Oral Nightly    risperiDONE  0.5 mg Oral Nightly    sevelamer carbonate  800 mg Oral TID CC    tacrolimus  0.5 mg Oral BID    tacrolimus  1 mg Oral QAM      Continuous Infusions:   PRN: risperiDONE, acetaminophen, ibuprofen     ASSESSMENT / PLAN:   78 year old male with history of BPH, CKD, congestive heart failure with history of heart transplant, hemodialysis, GERD, glaucoma, gout, hypertension, hyperlipidemia, obstructive sleep apnea  presenting with PD catheter pain.     PD catheter pain  -surgery consulted  -POD # 2 s/p removal of PD catheter     Cellulitis  -concern for cellulitis at PD catheter site  -IV ceftriaxone--> iv cefazolin with HD for 2 weeks through 3/1  -ID following--> ok to dc once above can be set up      ESRD  -HD per renal     Dementia  -namenda  -risperidone     HLD  -pravastatin      Hx Heart transplant  -tacrolimus      Gout  -allopurinol       NEPHROLOGY NOTE       Physical Exam:   /76 (BP Location: Left arm)   Pulse 77   Temp 98.5 °F (36.9 °C) (Oral)   Resp 18   Ht 5' 11\" (1.803 m)   Wt 192 lb 7.4 oz (87.3 kg)   SpO2 94%   BMI 26.84 kg/m²   Temp (24hrs), Av °F (36.7 °C), Min:97.6 °F (36.4 °C), Max:98.5 °F (36.9 °C)          Impression/Plan:     1.  Dislodged Tenckhoff catheter- appreciate surgery eval.  S/p Removal. HE is not a candidate for CCPD moving forward due to dementia.     2.  ESRD- HD to cont per usual TTHS schedule ; pt can get ancef at HD x 2 wks (post HD through 3/1/24 - 1g on Tues/Thurs, 2 g on Saturday)     3.  Anemia- due to ESRD.  Cont ERVIN for goal hgb 10-11 gms     4.  Heart transplant- on tacro     5. Dementia     SW NOTE  MSW called [facility] 2x. No answer, no way to leave vm to discuss pt needing IVabex with HD

## 2024-02-20 NOTE — PAYOR COMM NOTE
--------------  DISCHARGE REVIEW    Payor: [employer]  Subscriber #:  [id number]  Authorization Number: [id number]    Admit date: 2/15/24  Admit time:  10:22 PM  Discharge Date: 2/19/2024 11:02 AM     Admitting Physician: Uday Frias DO  Attending Physician:  No att. providers found  Primary Care Physician: Anoop Keys MD       Discharge Summary Notes    No notes of this type exist for this encounter.

## 2024-05-21 NOTE — PROGRESS NOTES
12/22/22 2034 12/22/22 2036 12/22/22 2038   Vital Signs   Pulse 88 82 86   Heart Rate Source  --   --  Monitor   Resp  --   --  18   Respiratory Quality  --   --  Normal   BP (!) 168/111 (!) 172/101 147/89   MAP (mmHg) 124 118 103   BP Location Right arm Right arm Right arm   BP Method Automatic Automatic Automatic   Patient Position Lying Sitting Standing negative  Neurological ROS: negative  ENT ROS: negative  Allergy and Immunology ROS: negative  Hematological and Lymphatic ROS: negative  Endocrine ROS: negative  Breast ROS: negative  Respiratory ROS: negative  Cardiovascular ROS: negative  Gastrointestinal ROS:negative  Genito-Urinary ROS: negative  Musculoskeletal ROS: negative   Skin ROS: negative    Physical Exam     Constitutional: Patient is oriented to person, place, and time. Patient appears well-developed and well-nourished. Patient is active and cooperative.  Non-toxic appearance. No distress.   HENT:   Head: Normocephalic and atraumatic. Head is without abrasion and without laceration. Hair is normal.   Right Ear: External ear normal. No lacerations. No drainage, swelling .   Left Ear: External ear normal. No lacerations. No drainage, swelling.   Nose/Mouth: no abrasions nor lesions.  Eyes: Conjunctivae, EOM and lids are normal. Right eye exhibits no discharge. No foreign body present in the right eye. Left eye exhibits no discharge. No foreign body present in the left eye. No scleral icterus.   Neck:No JVD present.   Pulmonary/Chest: Effort normal. No accessory muscle usage or stridor. No apnea. No respiratory distress.   Cardiovascular: Normal rate and no JVD.   Abdominal: Normal appearance. Patient exhibits no distension.    Musculoskeletal: Normal range of motion. Patient exhibits no edema.   Neurological: Patient is alert and oriented to person, place, and time.  Skin: Skin is warm and dry. No abrasion and no rash noted. Patient is not diaphoretic. No cyanosis or erythema.   Psychiatric: Patient has a normal mood and affect. Speech is normal and behavior is normal.       A/P:    Abby was seen today for bariatrics post op follow up.    Diagnoses and all orders for this visit:    S/P laparoscopic sleeve gastrectomy    Adult BMI 28.0-28.9 kg/sq m    Hyperlipemia, mixed          Abby Alcazar is 53 y.o. female , now with Body mass index is 28.09

## (undated) DEVICE — Device: Brand: DEFENDO AIR/WATER/SUCTION AND BIOPSY VALVE

## (undated) DEVICE — SUTURE ETHILON 3-0 PS-1

## (undated) DEVICE — SUPER TURBOVAC 90 IFS: Brand: COBLATION

## (undated) DEVICE — KENDALL SCD EXPRESS SLEEVES, KNEE LENGTH, MEDIUM: Brand: KENDALL SCD

## (undated) DEVICE — [AGGRESSIVE PLUS CUTTER, ARTHROSCOPIC SHAVER BLADE,  DO NOT RESTERILIZE,  DO NOT USE IF PACKAGE IS DAMAGED,  KEEP DRY,  KEEP AWAY FROM SUNLIGHT]: Brand: FORMULA

## (undated) DEVICE — BSS BAG CENTURION

## (undated) DEVICE — [TOMCAT CUTTER, ARTHROSCOPIC SHAVER BLADE,  DO NOT RESTERILIZE,  DO NOT USE IF PACKAGE IS DAMAGED,  KEEP DRY,  KEEP AWAY FROM SUNLIGHT]: Brand: FORMULA

## (undated) DEVICE — SHOULDER ARTHROSCOPY CDS-LF: Brand: MEDLINE INDUSTRIES, INC.

## (undated) DEVICE — GENERAL LAPAROS CDS-LF: Brand: MEDLINE INDUSTRIES, INC.

## (undated) DEVICE — 3M™ RED DOT™ MONITORING ELECTRODE WITH FOAM TAPE AND STICKY GEL, 50/BAG, 20/CASE, 72/PLT 2570: Brand: RED DOT™

## (undated) DEVICE — TUBING IRR 16FT CNT WV 3 ASCP

## (undated) DEVICE — Device

## (undated) DEVICE — STERILE WATER 1000ML BTL

## (undated) DEVICE — ABDOMINAL PAD: Brand: DERMACEA

## (undated) DEVICE — LIGHT HANDLE

## (undated) DEVICE — BANDAGE,GAUZE,BULKEE II,4.5"X4.1YD,STRL: Brand: MEDLINE

## (undated) DEVICE — PLUG ADPT 0.045ML L2.652CM NDLLSS M LUERLOCK

## (undated) DEVICE — #15 STERILE STAINLESS BLADE: Brand: STERILE STAINLESS BLADES

## (undated) DEVICE — HUNTER GASPER TIP, DISPOSABLE: Brand: RENEW

## (undated) DEVICE — SINGLE USE MEDICAL DEVICE FOR OPHTHALMIC SURGERY: Brand: SIL. COATED I/A 45 MIL 12/B

## (undated) DEVICE — ULTRABRAID II, NO.2 BLUE SUTURE 38                                    DEGREE BOX OF 10: Brand: ULTRABRAID

## (undated) DEVICE — SOLUTION IRRIG 1000ML 0.9% NACL USP BTL

## (undated) DEVICE — NEEDLE CYSTOTOME W/25G CANNULA

## (undated) DEVICE — HYDROEXPRESSION CANNULA [CORYDON] .40 X 22MM (27G X 7/8IN) (30 DEGREES): Brand: VISITEC®

## (undated) DEVICE — STERILE POLYISOPRENE POWDER-FREE SURGICAL GLOVES WITH EMOLLIENT COATING: Brand: PROTEXIS

## (undated) DEVICE — ENDOSCOPY PACK UPPER: Brand: MEDLINE INDUSTRIES, INC.

## (undated) DEVICE — SOLUTION SET, MALE LUER LOCK ADAPTER

## (undated) DEVICE — BIOPATCH™ ANTIMICROBIAL DRESSING WITH CHLORHEXIDINE GLUCONATE IS A HYDROPHILLIC POLYURETHANE ABSORPTIVE FOAM WITH CHLORHEXIDINE GLUCONATE (CHG) WHICH INHIBITS BACTERIAL GROWTH UNDER THE DRESSING. THE DRESSING IS INTENDED TO BE USED TO ABSORB EXUDATE, COVER A WOUND CAUSED BY VASCULAR AND NONVASCULAR PERCUTANEOUS MEDICAL DEVICES DURING SURGERY, AS WELL AS REDUCE LOCAL INFECTION AND COLONIZATION OF MICROORGANISMS.: Brand: BIOPATCH

## (undated) DEVICE — GAUZE,SPONGE,4"X4",12PLY,STERILE,LF,2'S: Brand: MEDLINE

## (undated) DEVICE — TROCAR: Brand: KII FIOS FIRST ENTRY

## (undated) DEVICE — SOLUTION IV 1000ML 0.9% NACL PRESERVATIVE

## (undated) DEVICE — EYE PACK: Brand: MEDLINE INDUSTRIES, INC.

## (undated) DEVICE — THIS DEVICE IS A PLASTIC DISCONNECT CAP FOR PERITONEAL DIALYSIS AND CONTAINS POVIDONE-IODINE INTENDED TO PROTECT THE FEMALE LUER CONNECTOR OF THE BAXTER TRANSFER SET.: Brand: MINICAP WITH POVIDONE-IODINE SOLUTION

## (undated) DEVICE — CANNULA EYE IRRIGATING TIP 30G

## (undated) DEVICE — 5.5 MM CANNULA AND OBTURATOR,                                    CONICAL TIP, DISTAL HOLES

## (undated) DEVICE — DRAPE,U/SHT,SPLIT,FILM,60X84,STERILE: Brand: MEDLINE

## (undated) DEVICE — CATARACT PATIENT CARE KIT

## (undated) DEVICE — KIT VLV 5 PC AIR H2O SUCT BX ENDOGATOR CONN

## (undated) DEVICE — SUTURE VCRL SZ 2-0 L27IN ABSRB UD L26MM SH

## (undated) DEVICE — PERITONEAL DIALYSIS ACCESSORY,TWO PART TITANIUM LUER ADAPTER: Brand: ARGYLE

## (undated) DEVICE — 1200CC GUARDIAN II: Brand: GUARDIAN

## (undated) DEVICE — CHLORAPREP 26ML APPLICATOR

## (undated) DEVICE — SHOULDER TRACTION KIT AR-1635

## (undated) DEVICE — ADHESIVE SKIN TOP FOR WND CLSR DERMBND ADV

## (undated) DEVICE — STAPLER SKIN ROTICULATING PRW3

## (undated) DEVICE — GLOVE SUR 8 PROTEXIS PIP CRM PWD F

## (undated) DEVICE — BLADE ARTH BUR 4 375-940-012

## (undated) DEVICE — FORCEP RADIAL JAW 4

## (undated) DEVICE — OCCLUSIVE GAUZE STRIP OVERWRAP,3% BISMUTH TRIBROMOPHENATE IN PETROLATUM BLEND: Brand: XEROFORM

## (undated) DEVICE — TRADITIONAL MARYLAND DISSECTOR TIP, DISPOSABLE: Brand: RENEW

## (undated) DEVICE — PENCIL TELESCOPE MEGADYNE SE

## (undated) DEVICE — SUTURE VCRL SZ 3-0 L27IN ABSRB UD L26MM SH

## (undated) DEVICE — STERILE POLYISOPRENE POWDER-FREE SURGICAL GLOVES: Brand: PROTEXIS

## (undated) DEVICE — 3M™ IOBAN™ 2 ANTIMICROBIAL INCISE DRAPE 6648EZ: Brand: IOBAN™ 2

## (undated) DEVICE — SMARTSTITCH PERFECTPASSER                                    MAGNUMWIRE SUTURE CARTRIDGES, BLACK CO-BRAID: Brand: SMARTSTITCH PERFECTPASSER

## (undated) DEVICE — STERIS KITS

## (undated) DEVICE — LAPAROVUE VISIBILITY SYSTEM LAPAROSCOPIC SOLUTIONS: Brand: LAPAROVUE

## (undated) DEVICE — OPHTHALMIC KNIFE 15°: Brand: ALCON

## (undated) DEVICE — TUBING DW OUTFLOW

## (undated) DEVICE — SUTURE MCRYL SZ 4-0 L18IN ABSRB UD L19MM PS-2

## (undated) DEVICE — INSUFFLATION NEEDLE TO ESTABLISH PNEUMOPERITONEUM.: Brand: INSUFFLATION NEEDLE

## (undated) DEVICE — SOL  .9 3000ML

## (undated) DEVICE — UNFOLDER PLATINUM 1 SERIES CRTRDG 30/BOX: Brand: UNFOLDER PLATINUM 1 SERIES

## (undated) DEVICE — CANN ANTERIOR CHAMBER 27G 7/8

## (undated) DEVICE — PREP BETADINE SOL 5% EYE

## (undated) DEVICE — 10FT COMBINED O2 DELIVERY/CO2 MONITORING. FILTER WITH MICROSTREAM TYPE LUER: Brand: DUAL ADULT NASAL CANNULA

## (undated) DEVICE — CLEARCUT® SLIT KNIFE INTREPID MICRO-COAXIAL SYSTEM 2.4 SB: Brand: CLEARCUT®; INTREPID

## (undated) DEVICE — BITEBLOCK ENDOSCP 60FR MAXI STRP

## (undated) DEVICE — Device: Brand: JELCO

## (undated) DEVICE — ENDOCUT SCISSOR TIP, DISPOSABLE: Brand: RENEW

## (undated) DEVICE — SPONGE GZ W4XL4IN 100% COT 12 PLY TYP VII WVN

## (undated) DEVICE — SHEET,DRAPE,70X100,STERILE: Brand: MEDLINE

## (undated) DEVICE — SMARTSTITCH PERFECTPASSER                                    MAGNUMWIRE SUTURE CARTRIDGES, BLUE CO-BRAID: Brand: SMARTSTITCH PERFECTPASSER

## (undated) DEVICE — PACK LAP MINI

## (undated) DEVICE — SHOULDER P.A.D II: Brand: DEROYAL

## (undated) DEVICE — AMBIENT MEGAVAC 90: Brand: COBLATION

## (undated) DEVICE — 5.0 MM I.D. UNIVERSAL CANNULA, 76                                    MM LONG, BLUE, LATEX SEAL,                                    SINGLE-USE STERILE PACKS, BOX OF 10.                                    INCLUDES OBTURATOR AND TROCAR

## (undated) DEVICE — ACTIVE FMS W/ INTREPID* ULTRA SLEEVES, 0.9MM 30° ABS* INTREPID* BALANCED TIP: Brand: ALCON

## (undated) DEVICE — WRAP COOLING SHLDR W/ICE PILLO

## (undated) DEVICE — GIJAW SINGLE-USE MINI BIOPSY FORCEPS WITHOUT NEEDLE: Brand: GIJAW

## (undated) DEVICE — SLEEVE COMPR M KNEE LEN SGL USE KENDALL SCD

## (undated) DEVICE — FALLER TUNNELING TROCAR: Brand: ARGYLE

## (undated) DEVICE — FILTERLINE NASAL ADULT O2/CO2

## (undated) DEVICE — SUT SLK 0 SH BLK BRD 30 75CM

## (undated) DEVICE — DRESSING TRNSPAR W4XL4.75IN FRME STYL

## (undated) DEVICE — TROCAR: Brand: KII® SLEEVE

## (undated) NOTE — LETTER
[facility]  Phone: [phone]  Right Fax: [phone]  MEDICATION CONTRAINDICATION     Sent By:  MCKINLEY Decker Date: 24    Patient Name: Nader Campbell  Surgery Date: 2024    CSN: [id number]  Medical Record: [id number]   : 8/10/1945 - A: 78 y      Sex: male  Surgeon(s):  Julio Cesar Reyes MD  Procedure: LAPAROSCOPIC PERITONEAL DIALYSIS CATHETER PLACEMENT, N/A    Procedure Comments: LAPAROSCOPIC PERITONEAL DIALYSIS CATHETER PLACEMENT  Anesthesia Type: General    To Surgeon: Julio Cesar Reyes MD Fax #: [phone]      The above patient has a contraindication to the medication ordered from your standing orders/[facility] orders listed below.   LOVENOX- patient is allergic to heparin and similar ingredients (deltaparin) are in Lovenox . If doctor would like this given pleas check one of the boxes below and fax back.   If you would like the medication given, please fax this form back indicating the override reason with physician signature/date/time.                ___  Benefit outweighs risk   ___  Insignificant               ___ Low risk                 ___ Not a true allergy              ___ Dose appropriate                ___  Tolerated regimen in the past     Physician Signature: ________________________ Date/Time: ______________  PLEASE FAX FORM BACK TO:  [phone]    CONFIDENTIALITY NOTICE  This transmission is intended only for the use of the individual or entity to which it is addressed and may contain information that is privileged and confidential.  If the reader of this message is not the intended recipient, you are hereby notified that any disclosure, distribution, or copying of this information is strictly prohibited.  If you have received this transmission in error, please notify us immediately by telephone, and return the original documents to us at the address listed above.

## (undated) NOTE — LETTER
[facility]  Phone: [phone]  Right Fax: [phone]  [facility] 20 By:  Dr. Janak Dawson  Date: 3/24/21    Patient Name: Sergio Mcghee  Surgery Date: 3/30/2021    CSN: [id number]  Medical Record: [id number] Final result  Ordering provider: Willi Townsend MD  03/20/21 1145 Resulting lab: [facility])   Specimen Information    ID Type Source Collected On   — Blood — 03/20/21 1145   Components    Component Value Reference Ran

## (undated) NOTE — LETTER
[facility]  Phone: [phone]  Right Fax: [phone]  EVALUATION REQUEST PREOP    Sent By:  Pierce Tidwell Rn Date: 3/24/21    Patient Name: Haven Gail  Surgery Date: 3/30/2021    CSN: [id number]  Medical Record: [id number]

## (undated) NOTE — LETTER
[address]  [address]  Authorization for Surgical Operation and Procedure     Date:___________                                                                                                         Time:__________  I hereby authorize Surgeon(s):  Julio Cesar Reyes MD, my physician and his/her assistants (if applicable), which may include medical students, residents, and/or fellows, to perform the following surgical operation/ procedure and administer such anesthesia as may be determined necessary by my physician:  Operation/Procedure name (s) Procedure(s):  REMOVAL OF  PERITONEAL DIALYSIS CATHETER on Nader Campbell   2.   I recognize that during the surgical operation/procedure, unforeseen conditions may necessitate additional or different procedures than those listed above.  I, therefore, further authorize and request that the above-named surgeon, assistants, or designees perform such procedures as are, in their judgment, necessary and desirable.    3.   My surgeon/physician has discussed prior to my surgery the potential benefits, risks and side effects of this procedure; the likelihood of achieving goals; and potential problems that might occur during recuperation.  They also discussed reasonable alternatives to the procedure, including risks, benefits, and side effects related to the alternatives and risks related to not receiving this procedure.  I have had all my questions answered and I acknowledge that no guarantee has been made as to the result that may be obtained.    4.   Should the need arise during my operation/procedure, which includes change of level of care prior to discharge, I also consent to the administration of blood and/or blood products.  Further, I understand that despite careful testing and screening of blood or blood products by collecting agencies, I may still be subject to ill effects as a result of receiving a blood transfusion and/or blood  products.  The following are some, but not all, of the potential risks that can occur: fever and allergic reactions, hemolytic reactions, transmission of diseases such as Hepatitis, AIDS and Cytomegalovirus (CMV) and fluid overload.  In the event that I wish to have an autologous transfusion of my own blood, or a directed donor transfusion, I will discuss this with my physician.  Check only if Refusing Blood or Blood Products  I understand refusal of blood or blood products as deemed necessary by my physician may have serious consequences to my condition to include possible death. I hereby assume responsibility for my refusal and release the hospital, its personnel, and my physicians from any responsibility for the consequences of my refusal.          o  Refuse      5.   I authorize the use of any specimen, organs, tissues, body parts or foreign objects that may be removed from my body during the operation/procedure for diagnosis, research or teaching purposes and their subsequent disposal by hospital authorities.  I also authorize the release of specimen test results and/or written reports to my treating physician on the hospital medical staff or other referring or consulting physicians involved in my care, at the discretion of the Pathologist or my treating physician.    6.   I consent to the photographing or videotaping of the operations or procedures to be performed, including appropriate portions of my body for medical, scientific, or educational purposes, provided my identity is not revealed by the pictures or by descriptive texts accompanying them.  If the procedure has been photographed/videotaped, the surgeon will obtain the original picture, image, videotape or CD.  The hospital will not be responsible for storage, release or maintenance of the picture, image, tape or CD.    7.   I consent to the presence of a  or observers in the operating room as deemed necessary by my physician or  their designees.    8.   I recognize that in the event my procedure results in extended X-Ray/fluoroscopy time, I may develop a skin reaction.    9. If I have a Do Not Attempt Resuscitation (DNAR) order in place, that status will be suspended while in the operating room, procedural suite, and during the recovery period unless otherwise explicitly stated by me (or a person authorized to consent on my behalf). The surgeon or my attending physician will determine when the applicable recovery period ends for purposes of reinstating the DNAR order.  10. Patients having a sterilization procedure: I understand that if the procedure is successful the results will be permanent and it will therefore be impossible for me to inseminate, conceive, or bear children.  I also understand that the procedure is intended to result in sterility, although the result has not been guaranteed.   11. I acknowledge that my physician has explained sedation/analgesia administration to me including the risk and benefits I consent to the administration of sedation/analgesia as may be necessary or desirable in the judgment of my physician.    I CERTIFY THAT I HAVE READ AND FULLY UNDERSTAND THE ABOVE CONSENT TO OPERATION and/or OTHER PROCEDURE.    _________________________________________  __________________________________  Signature of Patient     Signature of Responsible Person         ___________________________________         Printed Name of Responsible Person           _________________________________                 Relationship to Patient  _________________________________________  ______________________________  Signature of Witness          Date  Time      Patient Name: Nader Campbell     : 8/10/1945                 Printed: 2024     Medical Record #: [id number]                     Page [address] of [address] [place] [address]  [id number]    Consent for  Anesthesia    I, Nader Campbell agree to be cared for by an anesthesiologist, who is specially trained to monitor me and give me medicine to put me to sleep or keep me comfortable during my procedure    I understand that my anesthesiologist is not an employee or agent of [facility] [employer]. He or she works for [employer].    As the patient asking for anesthesia services, I agree to:  Allow the anesthesiologist (anesthesia doctor) to give me medicine and do additional procedures as necessary. Some examples are: Starting or using an “IV” to give me medicine, fluids or blood during my procedure, and having a breathing tube placed to help me breathe when I’m asleep (intubation). In the event that my heart stops working properly, I understand that my anesthesiologist will make every effort to sustain my life, unless otherwise directed by [facility] Do Not Resuscitate documents.  Tell my anesthesia doctor before my procedure:  If I am pregnant.  The last time that I ate or drank.  All of the medicines I take (including prescriptions, herbal supplements, and pills I can buy without a prescription (including street drugs/illegal medications). Failure to inform my anesthesiologist about these medicines may increase my risk of anesthetic complications.  If I am allergic to anything or have had a reaction to anesthesia before.  I understand how the anesthesia medicine will help me (benefits).  I understand that with any type of anesthesia medicine there are risks:  The most common risks are: nausea, vomiting, sore throat, muscle soreness, damage to my eyes, mouth, or teeth (from breathing tube placement).  Rare risks include: remembering what happened during my procedure, allergic reactions to medications, injury to my airway, heart, lungs, vision, nerves, or muscles and in extremely rare instances death.  My doctor has explained to me other choices available to me for my  care (alternatives).  Pregnant Patients (“epidural”):  I understand that the risks of having an epidural (medicine given into my back to help control pain during labor), include itching, low blood pressure, difficulty urinating, headache or slowing of the baby’s heart. Very rare risks include infection, bleeding, seizure, irregular heart rhythms and nerve injury.  Regional Anesthesia (“spinal”, “epidural”, & “nerve blocks”):  I understand that rare but potential complications include headache, bleeding, infection, seizure, irregular heart rhythms, and nerve injury.    I can change my mind about having anesthesia services at any time before I get the medicine.    _____________________________________________________________________________  Patient (or Representative) Signature/Relationship to Patient  Date   Time    _____________________________________________________________________________   Name (if used)    Language/Organization   Time    _____________________________________________________________________________  Anesthesiologist Signature     Date   Time  I have discussed the procedure and information above with the patient (or patient’s representative) and answered their questions. The patient or their representative has agreed to have anesthesia services.    _____________________________________________________________________________  Witness        Date   Time  I have verified that the signature is that of the patient or patient’s representative, and that it was signed before the procedure  Patient Name: Nader Campbell     : 8/10/1945                 Printed: 2024     Medical Record #: [id number]                     Page 2 of 2

## (undated) NOTE — LETTER
MEDICATION CONTRAINDICATION     Patient Name: Nader Campbell  -Age: 8/10/1945-A: 78 y Sex:  male   MRN: [id number] [facility]: [id number]        Procedure: LAPAROSCOPIC PERITONEAL DIALYSIS CATHETER PLACEMENT   Surgery Date:  2024  Anesthesia Type: General  Surgeon(s):  Julio Cesar Reyes MD     The above patient has a contraindication to the medication ordered from your standing orders/[facility] Standing orders listed below.    Medication: Lovenox  Allergies: Daltaparin  Contraindication: hives, rash    If you would like the medication given, please fax this form back indicating the override reason with physician signature/date/time.              ___  Benefit outweighs risk   ___  Insignificant               ___ Low risk                 ___ Not a true allergy              ___ Dose appropriate                ___  Tolerated regimen in the past     Physician Signature: ________________________ Date/Time: ______________  PLEASE FAX FORM BACK TO:  [phone]

## (undated) NOTE — LETTER
24    Patient: Nader Campbell  : 8/10/1945 Visit date: 2024    Dear  Anoop Keys MD    Thank you for referring Nader Campbell to my practice.  Please find my assessment and plan below.     Assessment   1. ESRD on dialysis (HCC)          Plan     The patient will be scheduled for laparoscopic peritoneal dialysis catheter placement.    The harinder-operative care plan was discussed with the patient, who voices understanding.  Activity and lifting recommendations were discussed in length.     The risks, benefits, and alternatives to the procedure were explained to the patient.  The risks explained include, but are not limited to, bleeding, infection, pain wound complications, recurrence, incorrect diagnosis, injury to adjacent organs and structures. We also discussed the possibile need for further therapeutic, diagnostic, or surgical intervention.  The patient voiced understanding, and after all questions were answered to the patient's satisfaction, the patient provided willing and informed consent to proceed.      Sincerely,       Julio Cesar Reyes MD   CC:   No Recipients

## (undated) NOTE — LETTER
11/22/2021      [phone]    Dear Vipin Leary,     Here are your results from your recent visit with Gastroenterology. You will be happy to know that your COVID 19 test returned NEGATIVE.     If you nee

## (undated) NOTE — LETTER
[facility]  Dennis Montanezik 63 [address]  Consent for Procedure/Sedation  Date: 11/06/2023         Time: 0847    I hereby authorize Dr. Pedro Tran, my physician and his/her assistants (if applicable), which may include medical students, residents, and/or fellows, to perform the following surgical operation/ procedure and administer such anesthesia as may be determined necessary by my physician: Permanent dialysis catheter removal on Mary Adkins  2. I recognize that during the surgical operation/procedure, unforeseen conditions may necessitate additional or different procedures than those listed above. I, therefore, further authorize and request that the above-named surgeon, assistants, or designees perform such procedures as are, in their judgment, necessary and desirable. 3.   My surgeon/physician has discussed prior to my surgery the potential benefits, risks and side effects of this procedure; the likelihood of achieving goals; and potential problems that might occur during recuperation. They also discussed reasonable alternatives to the procedure, including risks, benefits, and side effects related to the alternatives and risks related to not receiving this procedure. I have had all my questions answered and I acknowledge that no guarantee has been made as to the result that may be obtained. 4.   Should the need arise during my operation/procedure, which includes change of level of care prior to discharge, I also consent to the administration of blood and/or blood products. Further, I understand that despite careful testing and screening of blood or blood products by collecting agencies, I may still be subject to ill effects as a result of receiving a blood transfusion and/or blood products.   The following are some, but not all, of the potential risks that can occur: fever and allergic reactions, hemolytic reactions, transmission of diseases such as Hepatitis, AIDS and Cytomegalovirus (CMV) and fluid overload. In the event that I wish to have an autologous transfusion of my own blood, or a directed donor transfusion, I will discuss this with my physician. Check only if Refusing Blood or Blood Products  I understand refusal of blood or blood products as deemed necessary by my physician may have serious consequences to my condition to include possible death. I hereby assume responsibility for my refusal and release the hospital, its personnel, and my physicians from any responsibility for the consequences of my refusal.         o  Refuse         5. I authorize the use of any specimen, organs, tissues, body parts or foreign objects that may be removed from my body during the operation/procedure for diagnosis, research or teaching purposes and their subsequent disposal by hospital authorities. I also authorize the release of specimen test results and/or written reports to my treating physician on the hospital medical staff or other referring or consulting physicians involved in my care, at the discretion of the Pathologist or my treating physician. 6.   I consent to the photographing or videotaping of the operations or procedures to be performed, including appropriate portions of my body for medical, scientific, or educational purposes, provided my identity is not revealed by the pictures or by descriptive texts accompanying them. If the procedure has been photographed/videotaped, the surgeon will obtain the original picture, image, videotape or CD. The hospital will not be responsible for storage, release or maintenance of the picture, image, tape or CD.    7.   I consent to the presence of a  or observers in the operating room as deemed necessary by my physician or their designees. 8.   I recognize that in the event my procedure results in extended X-Ray/fluoroscopy time, I may develop a skin reaction. 9.  If I have a Do Not Attempt Resuscitation (DNAR) order in place, that status will be suspended while in the operating room, procedural suite, and during the recovery period unless otherwise explicitly stated by me (or a person authorized to consent on my behalf). The surgeon or my attending physician will determine when the applicable recovery period ends for purposes of reinstating the DNAR order. 10. Patients having a sterilization procedure: I understand that if the procedure is successful the results will be permanent and it will therefore be impossible for me to inseminate, conceive, or bear children. I also understand that the procedure is intended to result in sterility, although the result has not been guaranteed. 11. I acknowledge that my physician has explained sedation/analgesia administration to me including the risk and benefits I consent to the administration of sedation/analgesia as may be necessary or desirable in the judgment of my physician.     I CERTIFY THAT I HAVE READ AND FULLY UNDERSTAND THE ABOVE CONSENT TO OPERATION and/or OTHER PROCEDURE.        ____________________________________       _________________________________      ______________________________  Signature of Patient         Signature of Responsible Person        Printed Name of Responsible Person        ____________________________________      _________________________________      ______________________________       Signature of Witness          Relationship to Patient                       Date                                       Time  Patient Name: Christopher Mckinney     : 89                 Printed: 2023      Medical Record #: [id number]                      Page 1 of

## (undated) NOTE — LETTER
24    Patient: Nader Campbell  : 8/10/1945 Visit date: 2024    Dear  Anoop Keys MD    Thank you for referring Nader Campbell to my practice.  Please find my assessment and plan below.    Assessment   1. ESRD on dialysis (HCC)          Plan     The patient is recovering nicely following laparoscopic peritoneal dialysis catheter placement.    During a moment of confusion, the patient pulled on his catheter causing subcutaneous ecchymosis and slight hematoma.    Catheter is flushed easily with sterile saline solution in the office today.  There is immediate return of saline.  No obstruction to the catheter.    The anticipated postoperative recovery was discussed with the patient in detail.    Dietary, activity, and exercise recommendations along with restrictions were discussed with the patient during today's visit.    Wound care instructions were discussed during today's visit.    The patient will return to my attention on an as needed basis.    The patient is encouraged to continue seeing the primary care physician for ongoing medical needs.    The patient was given ample opportunity to ask questions.  The patient's questions were answered in detail and to the patient's satisfaction.  The patient voiced understanding of the postoperative care plan.             Sincerely,       Julio Cesar Reyes MD   CC:   No Recipients

## (undated) NOTE — LETTER
MEDICATION CONTRAINDICATION     Patient Name: Nader Campbell  -Age: 8/10/1945-A: 78 y Sex:  male   MRN: [id number] [facility]: [id number]        Procedure: LAPAROSCOPIC PERITONEAL DIALYSIS CATHETER PLACEMENT   Surgery Date:  2024  Anesthesia Type: General  Surgeon(s):  Julio Cesar Reyes MD     The above patient has a contraindication to the medication ordered from your standing orders/[facility] Standing orders listed below.    Medication: Heparin  Allergies: Deltaparin (Hives, rash)  Contraindication: Allergy link  If you would like the medication given, please fax this form back indicating the override reason with physician signature/date/time.              ___  Benefit outweighs risk   ___  Insignificant               ___ Low risk                 ___ Not a true allergy              ___ Dose appropriate                ___  Tolerated regimen in the past     Physician Signature: ________________________ Date/Time: ______________  PLEASE FAX FORM BACK TO:  [phone]

## (undated) NOTE — LETTER
[facility]  Phone: [phone]  Right Fax: [phone]  [address]                                                Date: 11/4/20    Patient Name: Roderick Bhatia  Surgery Date: 11/13/2020